# Patient Record
Sex: FEMALE | Race: WHITE | HISPANIC OR LATINO | Employment: OTHER | URBAN - METROPOLITAN AREA
[De-identification: names, ages, dates, MRNs, and addresses within clinical notes are randomized per-mention and may not be internally consistent; named-entity substitution may affect disease eponyms.]

---

## 2017-01-03 ENCOUNTER — HOSPITAL ENCOUNTER (OUTPATIENT)
Dept: LAB | Facility: MEDICAL CENTER | Age: 82
End: 2017-01-03
Attending: FAMILY MEDICINE
Payer: COMMERCIAL

## 2017-01-03 ENCOUNTER — HOSPITAL ENCOUNTER (OUTPATIENT)
Dept: LAB | Facility: MEDICAL CENTER | Age: 82
End: 2017-01-03
Attending: INTERNAL MEDICINE
Payer: COMMERCIAL

## 2017-01-03 LAB
ALBUMIN SERPL BCP-MCNC: 4.1 G/DL (ref 3.2–4.9)
ALBUMIN SERPL BCP-MCNC: 4.2 G/DL (ref 3.2–4.9)
ALBUMIN/GLOB SERPL: 1.8 G/DL
ALP SERPL-CCNC: 114 U/L (ref 30–99)
ALT SERPL-CCNC: 23 U/L (ref 2–50)
ANION GAP SERPL CALC-SCNC: 8 MMOL/L (ref 0–11.9)
AST SERPL-CCNC: 27 U/L (ref 12–45)
BASOPHILS # BLD AUTO: 0.02 K/UL (ref 0–0.12)
BASOPHILS NFR BLD AUTO: 0.4 % (ref 0–1.8)
BILIRUB SERPL-MCNC: 0.5 MG/DL (ref 0.1–1.5)
BUN SERPL-MCNC: 24 MG/DL (ref 8–22)
BUN SERPL-MCNC: 25 MG/DL (ref 8–22)
CALCIUM SERPL-MCNC: 9 MG/DL (ref 8.5–10.5)
CALCIUM SERPL-MCNC: 9.1 MG/DL (ref 8.5–10.5)
CHLORIDE SERPL-SCNC: 108 MMOL/L (ref 96–112)
CHLORIDE SERPL-SCNC: 109 MMOL/L (ref 96–112)
CO2 SERPL-SCNC: 23 MMOL/L (ref 20–33)
CO2 SERPL-SCNC: 24 MMOL/L (ref 20–33)
CREAT SERPL-MCNC: 1.43 MG/DL (ref 0.5–1.4)
CREAT SERPL-MCNC: 1.44 MG/DL (ref 0.5–1.4)
EOSINOPHIL # BLD: 0.08 K/UL (ref 0–0.51)
EOSINOPHIL NFR BLD AUTO: 1.6 % (ref 0–6.9)
ERYTHROCYTE [DISTWIDTH] IN BLOOD BY AUTOMATED COUNT: 47.4 FL (ref 35.9–50)
EST. AVERAGE GLUCOSE BLD GHB EST-MCNC: 140 MG/DL
FERRITIN SERPL-MCNC: 54.5 NG/ML (ref 10–291)
GLOBULIN SER CALC-MCNC: 2.3 G/DL (ref 1.9–3.5)
GLUCOSE SERPL-MCNC: 112 MG/DL (ref 65–99)
GLUCOSE SERPL-MCNC: 113 MG/DL (ref 65–99)
HBA1C MFR BLD: 6.5 % (ref 0–5.6)
HCT VFR BLD AUTO: 33.5 % (ref 37–47)
HGB BLD-MCNC: 10.6 G/DL (ref 12–16)
HGB RETIC QN AUTO: 34.9 PG/CELL (ref 29–35)
IMM GRANULOCYTES # BLD AUTO: 0.02 K/UL (ref 0–0.11)
IMM GRANULOCYTES NFR BLD AUTO: 0.4 % (ref 0–0.9)
IMM RETIC FRACTION L335166: 19.1 % (ref 9.3–17.4)
IRON SERPL-MCNC: 31 UG/DL (ref 40–170)
LYMPHOCYTES # BLD: 0.97 K/UL (ref 1–4.8)
LYMPHOCYTES NFR BLD AUTO: 19.4 % (ref 22–41)
MCH RBC QN AUTO: 29.9 PG (ref 27–33)
MCHC RBC AUTO-ENTMCNC: 31.6 G/DL (ref 33.6–35)
MCV RBC AUTO: 94.4 FL (ref 81.4–97.8)
MONOCYTES # BLD: 0.38 K/UL (ref 0–0.85)
MONOCYTES NFR BLD AUTO: 7.6 % (ref 0–13.4)
NEUTROPHILS # BLD: 3.54 K/UL (ref 2–7.15)
NEUTROPHILS NFR BLD AUTO: 70.6 % (ref 44–72)
NRBC # BLD AUTO: 0 K/UL
NRBC BLD-RTO: 0 /100 WBC
PHOSPHATE SERPL-MCNC: 3 MG/DL (ref 2.5–4.5)
PLATELET # BLD AUTO: 186 K/UL (ref 164–446)
PMV BLD AUTO: 11.3 FL (ref 9–12.9)
POTASSIUM SERPL-SCNC: 3.5 MMOL/L (ref 3.6–5.5)
POTASSIUM SERPL-SCNC: 3.5 MMOL/L (ref 3.6–5.5)
PROT SERPL-MCNC: 6.5 G/DL (ref 6–8.2)
PTH-INTACT SERPL-MCNC: 168.5 PG/ML (ref 14–72)
RBC # BLD AUTO: 3.55 M/UL (ref 4.2–5.4)
RETICS # AUTO: 0.05 M/UL (ref 0.04–0.06)
RETICS/RBC NFR: 1.5 % (ref 0.8–2.1)
SODIUM SERPL-SCNC: 141 MMOL/L (ref 135–145)
SODIUM SERPL-SCNC: 141 MMOL/L (ref 135–145)
WBC # BLD AUTO: 5 K/UL (ref 4.8–10.8)

## 2017-01-03 PROCEDURE — 85025 COMPLETE CBC W/AUTO DIFF WBC: CPT

## 2017-01-03 PROCEDURE — 83970 ASSAY OF PARATHORMONE: CPT

## 2017-01-03 PROCEDURE — 83540 ASSAY OF IRON: CPT

## 2017-01-03 PROCEDURE — 36415 COLL VENOUS BLD VENIPUNCTURE: CPT

## 2017-01-03 PROCEDURE — 80069 RENAL FUNCTION PANEL: CPT

## 2017-01-03 PROCEDURE — 82728 ASSAY OF FERRITIN: CPT

## 2017-01-03 PROCEDURE — 80053 COMPREHEN METABOLIC PANEL: CPT

## 2017-01-03 PROCEDURE — 83036 HEMOGLOBIN GLYCOSYLATED A1C: CPT

## 2017-01-03 PROCEDURE — 85046 RETICYTE/HGB CONCENTRATE: CPT

## 2017-01-10 ENCOUNTER — HOSPITAL ENCOUNTER (OUTPATIENT)
Facility: MEDICAL CENTER | Age: 82
End: 2017-01-10
Attending: FAMILY MEDICINE
Payer: COMMERCIAL

## 2017-01-10 PROCEDURE — 82274 ASSAY TEST FOR BLOOD FECAL: CPT

## 2017-01-14 LAB — HEMOCCULT STL QL IA: NEGATIVE

## 2017-03-15 ENCOUNTER — HOSPITAL ENCOUNTER (OUTPATIENT)
Dept: LAB | Facility: MEDICAL CENTER | Age: 82
End: 2017-03-15
Attending: INTERNAL MEDICINE
Payer: COMMERCIAL

## 2017-03-15 LAB
25(OH)D3 SERPL-MCNC: 28 NG/ML (ref 30–100)
ALBUMIN SERPL BCP-MCNC: 4.1 G/DL (ref 3.2–4.9)
BASOPHILS # BLD AUTO: 0.3 % (ref 0–1.8)
BASOPHILS # BLD: 0.02 K/UL (ref 0–0.12)
BUN SERPL-MCNC: 18 MG/DL (ref 8–22)
CALCIUM SERPL-MCNC: 9.2 MG/DL (ref 8.5–10.5)
CHLORIDE SERPL-SCNC: 100 MMOL/L (ref 96–112)
CO2 SERPL-SCNC: 23 MMOL/L (ref 20–33)
CREAT SERPL-MCNC: 1.38 MG/DL (ref 0.5–1.4)
CREAT UR-MCNC: 53.9 MG/DL
EOSINOPHIL # BLD AUTO: 0.09 K/UL (ref 0–0.51)
EOSINOPHIL NFR BLD: 1.5 % (ref 0–6.9)
ERYTHROCYTE [DISTWIDTH] IN BLOOD BY AUTOMATED COUNT: 46 FL (ref 35.9–50)
GLUCOSE SERPL-MCNC: 188 MG/DL (ref 65–99)
HCT VFR BLD AUTO: 35.4 % (ref 37–47)
HGB BLD-MCNC: 11.9 G/DL (ref 12–16)
IMM GRANULOCYTES # BLD AUTO: 0.01 K/UL (ref 0–0.11)
IMM GRANULOCYTES NFR BLD AUTO: 0.2 % (ref 0–0.9)
LYMPHOCYTES # BLD AUTO: 0.67 K/UL (ref 1–4.8)
LYMPHOCYTES NFR BLD: 11.5 % (ref 22–41)
MAGNESIUM SERPL-MCNC: 1.7 MG/DL (ref 1.5–2.5)
MCH RBC QN AUTO: 29.6 PG (ref 27–33)
MCHC RBC AUTO-ENTMCNC: 33.6 G/DL (ref 33.6–35)
MCV RBC AUTO: 88.1 FL (ref 81.4–97.8)
MONOCYTES # BLD AUTO: 0.48 K/UL (ref 0–0.85)
MONOCYTES NFR BLD AUTO: 8.2 % (ref 0–13.4)
NEUTROPHILS # BLD AUTO: 4.55 K/UL (ref 2–7.15)
NEUTROPHILS NFR BLD: 78.3 % (ref 44–72)
NRBC # BLD AUTO: 0 K/UL
NRBC BLD AUTO-RTO: 0 /100 WBC
PHOSPHATE SERPL-MCNC: 2.5 MG/DL (ref 2.5–4.5)
PLATELET # BLD AUTO: 165 K/UL (ref 164–446)
PMV BLD AUTO: 11.8 FL (ref 9–12.9)
POTASSIUM SERPL-SCNC: 3.9 MMOL/L (ref 3.6–5.5)
PROT 24H UR-MRATE: 9 MG/DL (ref 0–15)
PROT/CREAT UR: 167 MG/G (ref 10–107)
PTH-INTACT SERPL-MCNC: 130.9 PG/ML (ref 14–72)
RBC # BLD AUTO: 4.02 M/UL (ref 4.2–5.4)
SODIUM SERPL-SCNC: 134 MMOL/L (ref 135–145)
URATE SERPL-MCNC: 6.1 MG/DL (ref 1.9–8.2)
WBC # BLD AUTO: 5.8 K/UL (ref 4.8–10.8)

## 2017-03-15 PROCEDURE — 82306 VITAMIN D 25 HYDROXY: CPT

## 2017-03-15 PROCEDURE — 83970 ASSAY OF PARATHORMONE: CPT

## 2017-03-15 PROCEDURE — 85025 COMPLETE CBC W/AUTO DIFF WBC: CPT

## 2017-03-15 PROCEDURE — 36415 COLL VENOUS BLD VENIPUNCTURE: CPT

## 2017-03-15 PROCEDURE — 82570 ASSAY OF URINE CREATININE: CPT

## 2017-03-15 PROCEDURE — 84550 ASSAY OF BLOOD/URIC ACID: CPT

## 2017-03-15 PROCEDURE — 80069 RENAL FUNCTION PANEL: CPT

## 2017-03-15 PROCEDURE — 83735 ASSAY OF MAGNESIUM: CPT

## 2017-03-15 PROCEDURE — 84156 ASSAY OF PROTEIN URINE: CPT

## 2017-06-17 ENCOUNTER — HOSPITAL ENCOUNTER (OUTPATIENT)
Dept: LAB | Facility: MEDICAL CENTER | Age: 82
End: 2017-06-17
Attending: INTERNAL MEDICINE
Payer: COMMERCIAL

## 2017-06-17 ENCOUNTER — HOSPITAL ENCOUNTER (OUTPATIENT)
Dept: LAB | Facility: MEDICAL CENTER | Age: 82
End: 2017-06-17
Attending: FAMILY MEDICINE
Payer: COMMERCIAL

## 2017-06-17 ENCOUNTER — HOSPITAL ENCOUNTER (OUTPATIENT)
Dept: LAB | Facility: MEDICAL CENTER | Age: 82
End: 2017-06-17
Attending: PSYCHIATRY & NEUROLOGY
Payer: COMMERCIAL

## 2017-06-17 LAB
25(OH)D3 SERPL-MCNC: 32 NG/ML (ref 30–100)
ALBUMIN SERPL BCP-MCNC: 4.4 G/DL (ref 3.2–4.9)
BASOPHILS # BLD AUTO: 0.4 % (ref 0–1.8)
BASOPHILS # BLD AUTO: 0.6 % (ref 0–1.8)
BASOPHILS # BLD: 0.02 K/UL (ref 0–0.12)
BASOPHILS # BLD: 0.03 K/UL (ref 0–0.12)
BUN SERPL-MCNC: 29 MG/DL (ref 8–22)
CALCIUM SERPL-MCNC: 9.2 MG/DL (ref 8.5–10.5)
CHLORIDE SERPL-SCNC: 99 MMOL/L (ref 96–112)
CO2 SERPL-SCNC: 26 MMOL/L (ref 20–33)
CREAT SERPL-MCNC: 1.8 MG/DL (ref 0.5–1.4)
EOSINOPHIL # BLD AUTO: 0.06 K/UL (ref 0–0.51)
EOSINOPHIL # BLD AUTO: 0.07 K/UL (ref 0–0.51)
EOSINOPHIL NFR BLD: 1.2 % (ref 0–6.9)
EOSINOPHIL NFR BLD: 1.4 % (ref 0–6.9)
ERYTHROCYTE [DISTWIDTH] IN BLOOD BY AUTOMATED COUNT: 46.7 FL (ref 35.9–50)
ERYTHROCYTE [DISTWIDTH] IN BLOOD BY AUTOMATED COUNT: 47.1 FL (ref 35.9–50)
EST. AVERAGE GLUCOSE BLD GHB EST-MCNC: 157 MG/DL
FOLATE SERPL-MCNC: >23.7 NG/ML
GFR SERPL CREATININE-BSD FRML MDRD: 27 ML/MIN/1.73 M 2
GLUCOSE SERPL-MCNC: 158 MG/DL (ref 65–99)
HBA1C MFR BLD: 7.1 % (ref 0–5.6)
HCT VFR BLD AUTO: 35.2 % (ref 37–47)
HCT VFR BLD AUTO: 35.6 % (ref 37–47)
HGB BLD-MCNC: 11.4 G/DL (ref 12–16)
HGB BLD-MCNC: 11.6 G/DL (ref 12–16)
IMM GRANULOCYTES # BLD AUTO: 0.01 K/UL (ref 0–0.11)
IMM GRANULOCYTES # BLD AUTO: 0.01 K/UL (ref 0–0.11)
IMM GRANULOCYTES NFR BLD AUTO: 0.2 % (ref 0–0.9)
IMM GRANULOCYTES NFR BLD AUTO: 0.2 % (ref 0–0.9)
LYMPHOCYTES # BLD AUTO: 0.81 K/UL (ref 1–4.8)
LYMPHOCYTES # BLD AUTO: 0.82 K/UL (ref 1–4.8)
LYMPHOCYTES NFR BLD: 15.7 % (ref 22–41)
LYMPHOCYTES NFR BLD: 16.1 % (ref 22–41)
MCH RBC QN AUTO: 29.1 PG (ref 27–33)
MCH RBC QN AUTO: 29.1 PG (ref 27–33)
MCHC RBC AUTO-ENTMCNC: 32.4 G/DL (ref 33.6–35)
MCHC RBC AUTO-ENTMCNC: 32.6 G/DL (ref 33.6–35)
MCV RBC AUTO: 89.4 FL (ref 81.4–97.8)
MCV RBC AUTO: 89.8 FL (ref 81.4–97.8)
MONOCYTES # BLD AUTO: 0.27 K/UL (ref 0–0.85)
MONOCYTES # BLD AUTO: 0.28 K/UL (ref 0–0.85)
MONOCYTES NFR BLD AUTO: 5.3 % (ref 0–13.4)
MONOCYTES NFR BLD AUTO: 5.4 % (ref 0–13.4)
NEUTROPHILS # BLD AUTO: 3.9 K/UL (ref 2–7.15)
NEUTROPHILS # BLD AUTO: 3.96 K/UL (ref 2–7.15)
NEUTROPHILS NFR BLD: 76.6 % (ref 44–72)
NEUTROPHILS NFR BLD: 76.9 % (ref 44–72)
NRBC # BLD AUTO: 0 K/UL
NRBC # BLD AUTO: 0 K/UL
NRBC BLD AUTO-RTO: 0 /100 WBC
NRBC BLD AUTO-RTO: 0 /100 WBC
PHOSPHATE SERPL-MCNC: 3.5 MG/DL (ref 2.5–4.5)
PLATELET # BLD AUTO: 185 K/UL (ref 164–446)
PLATELET # BLD AUTO: 185 K/UL (ref 164–446)
PMV BLD AUTO: 10.6 FL (ref 9–12.9)
PMV BLD AUTO: 10.9 FL (ref 9–12.9)
POTASSIUM SERPL-SCNC: 4 MMOL/L (ref 3.6–5.5)
RBC # BLD AUTO: 3.92 M/UL (ref 4.2–5.4)
RBC # BLD AUTO: 3.98 M/UL (ref 4.2–5.4)
SODIUM SERPL-SCNC: 135 MMOL/L (ref 135–145)
T4 FREE SERPL-MCNC: 0.69 NG/DL (ref 0.53–1.43)
T4 FREE SERPL-MCNC: 0.71 NG/DL (ref 0.53–1.43)
TSH SERPL DL<=0.005 MIU/L-ACNC: 21.82 UIU/ML (ref 0.3–3.7)
VIT B12 SERPL-MCNC: 504 PG/ML (ref 211–911)
WBC # BLD AUTO: 5.1 K/UL (ref 4.8–10.8)
WBC # BLD AUTO: 5.2 K/UL (ref 4.8–10.8)

## 2017-06-17 PROCEDURE — 82607 VITAMIN B-12: CPT

## 2017-06-17 PROCEDURE — 82306 VITAMIN D 25 HYDROXY: CPT

## 2017-06-17 PROCEDURE — 85025 COMPLETE CBC W/AUTO DIFF WBC: CPT | Mod: 91

## 2017-06-17 PROCEDURE — 84443 ASSAY THYROID STIM HORMONE: CPT

## 2017-06-17 PROCEDURE — 80069 RENAL FUNCTION PANEL: CPT

## 2017-06-17 PROCEDURE — 85025 COMPLETE CBC W/AUTO DIFF WBC: CPT

## 2017-06-17 PROCEDURE — 36415 COLL VENOUS BLD VENIPUNCTURE: CPT

## 2017-06-17 PROCEDURE — 82746 ASSAY OF FOLIC ACID SERUM: CPT

## 2017-06-17 PROCEDURE — 84439 ASSAY OF FREE THYROXINE: CPT | Mod: 91

## 2017-06-17 PROCEDURE — 84439 ASSAY OF FREE THYROXINE: CPT

## 2017-06-17 PROCEDURE — 83036 HEMOGLOBIN GLYCOSYLATED A1C: CPT

## 2017-08-09 ENCOUNTER — HOSPITAL ENCOUNTER (OUTPATIENT)
Dept: LAB | Facility: MEDICAL CENTER | Age: 82
End: 2017-08-09
Attending: INTERNAL MEDICINE
Payer: COMMERCIAL

## 2017-08-09 LAB
ALBUMIN SERPL BCP-MCNC: 3.9 G/DL (ref 3.2–4.9)
BASOPHILS # BLD AUTO: 0.5 % (ref 0–1.8)
BASOPHILS # BLD: 0.02 K/UL (ref 0–0.12)
BUN SERPL-MCNC: 25 MG/DL (ref 8–22)
CALCIUM SERPL-MCNC: 9.3 MG/DL (ref 8.5–10.5)
CHLORIDE SERPL-SCNC: 105 MMOL/L (ref 96–112)
CO2 SERPL-SCNC: 24 MMOL/L (ref 20–33)
CREAT SERPL-MCNC: 1.35 MG/DL (ref 0.5–1.4)
CREAT UR-MCNC: 120.6 MG/DL
EOSINOPHIL # BLD AUTO: 0.12 K/UL (ref 0–0.51)
EOSINOPHIL NFR BLD: 2.8 % (ref 0–6.9)
ERYTHROCYTE [DISTWIDTH] IN BLOOD BY AUTOMATED COUNT: 45.9 FL (ref 35.9–50)
FERRITIN SERPL-MCNC: 28.6 NG/ML (ref 10–291)
GFR SERPL CREATININE-BSD FRML MDRD: 37 ML/MIN/1.73 M 2
GLUCOSE SERPL-MCNC: 99 MG/DL (ref 65–99)
HCT VFR BLD AUTO: 33.4 % (ref 37–47)
HGB BLD-MCNC: 11.1 G/DL (ref 12–16)
IMM GRANULOCYTES # BLD AUTO: 0.01 K/UL (ref 0–0.11)
IMM GRANULOCYTES NFR BLD AUTO: 0.2 % (ref 0–0.9)
IRON SATN MFR SERPL: 9 % (ref 15–55)
IRON SERPL-MCNC: 40 UG/DL (ref 40–170)
LYMPHOCYTES # BLD AUTO: 1.01 K/UL (ref 1–4.8)
LYMPHOCYTES NFR BLD: 23.3 % (ref 22–41)
MCH RBC QN AUTO: 29.8 PG (ref 27–33)
MCHC RBC AUTO-ENTMCNC: 33.2 G/DL (ref 33.6–35)
MCV RBC AUTO: 89.8 FL (ref 81.4–97.8)
MONOCYTES # BLD AUTO: 0.29 K/UL (ref 0–0.85)
MONOCYTES NFR BLD AUTO: 6.7 % (ref 0–13.4)
NEUTROPHILS # BLD AUTO: 2.88 K/UL (ref 2–7.15)
NEUTROPHILS NFR BLD: 66.5 % (ref 44–72)
NRBC # BLD AUTO: 0 K/UL
NRBC BLD AUTO-RTO: 0 /100 WBC
PHOSPHATE SERPL-MCNC: 3.2 MG/DL (ref 2.5–4.5)
PLATELET # BLD AUTO: 164 K/UL (ref 164–446)
PMV BLD AUTO: 11.4 FL (ref 9–12.9)
POTASSIUM SERPL-SCNC: 4.3 MMOL/L (ref 3.6–5.5)
PROT UR-MCNC: 36.4 MG/DL (ref 0–15)
PROT/CREAT UR: 302 MG/G (ref 10–107)
PTH-INTACT SERPL-MCNC: 121.3 PG/ML (ref 14–72)
RBC # BLD AUTO: 3.72 M/UL (ref 4.2–5.4)
SODIUM SERPL-SCNC: 139 MMOL/L (ref 135–145)
TIBC SERPL-MCNC: 442 UG/DL (ref 250–450)
URATE SERPL-MCNC: 5.8 MG/DL (ref 1.9–8.2)
WBC # BLD AUTO: 4.3 K/UL (ref 4.8–10.8)

## 2017-08-09 PROCEDURE — 82728 ASSAY OF FERRITIN: CPT

## 2017-08-09 PROCEDURE — 83540 ASSAY OF IRON: CPT

## 2017-08-09 PROCEDURE — 84550 ASSAY OF BLOOD/URIC ACID: CPT

## 2017-08-09 PROCEDURE — 80069 RENAL FUNCTION PANEL: CPT

## 2017-08-09 PROCEDURE — 85025 COMPLETE CBC W/AUTO DIFF WBC: CPT

## 2017-08-09 PROCEDURE — 84156 ASSAY OF PROTEIN URINE: CPT

## 2017-08-09 PROCEDURE — 83550 IRON BINDING TEST: CPT

## 2017-08-09 PROCEDURE — 82570 ASSAY OF URINE CREATININE: CPT

## 2017-08-09 PROCEDURE — 36415 COLL VENOUS BLD VENIPUNCTURE: CPT

## 2017-08-09 PROCEDURE — 83970 ASSAY OF PARATHORMONE: CPT

## 2017-10-10 ENCOUNTER — HOSPITAL ENCOUNTER (OUTPATIENT)
Dept: LAB | Facility: MEDICAL CENTER | Age: 82
End: 2017-10-10
Attending: FAMILY MEDICINE
Payer: COMMERCIAL

## 2017-10-10 ENCOUNTER — HOSPITAL ENCOUNTER (OUTPATIENT)
Dept: LAB | Facility: MEDICAL CENTER | Age: 82
End: 2017-10-10
Attending: INTERNAL MEDICINE
Payer: COMMERCIAL

## 2017-10-10 ENCOUNTER — HOSPITAL ENCOUNTER (OUTPATIENT)
Dept: LAB | Facility: MEDICAL CENTER | Age: 82
End: 2017-10-10
Attending: NURSE PRACTITIONER
Payer: COMMERCIAL

## 2017-10-10 LAB
ALBUMIN SERPL BCP-MCNC: 3.9 G/DL (ref 3.2–4.9)
ALBUMIN SERPL BCP-MCNC: 4.2 G/DL (ref 3.2–4.9)
ALBUMIN/GLOB SERPL: 1.3 G/DL
ALP SERPL-CCNC: 100 U/L (ref 30–99)
ALT SERPL-CCNC: 17 U/L (ref 2–50)
ANION GAP SERPL CALC-SCNC: 8 MMOL/L (ref 0–11.9)
AST SERPL-CCNC: 25 U/L (ref 12–45)
BASOPHILS # BLD AUTO: 0.5 % (ref 0–1.8)
BASOPHILS # BLD: 0.03 K/UL (ref 0–0.12)
BILIRUB SERPL-MCNC: 0.5 MG/DL (ref 0.1–1.5)
BUN SERPL-MCNC: 33 MG/DL (ref 8–22)
BUN SERPL-MCNC: 36 MG/DL (ref 8–22)
CALCIUM SERPL-MCNC: 9.7 MG/DL (ref 8.5–10.5)
CALCIUM SERPL-MCNC: 9.8 MG/DL (ref 8.5–10.5)
CHLORIDE SERPL-SCNC: 104 MMOL/L (ref 96–112)
CHLORIDE SERPL-SCNC: 104 MMOL/L (ref 96–112)
CO2 SERPL-SCNC: 25 MMOL/L (ref 20–33)
CO2 SERPL-SCNC: 25 MMOL/L (ref 20–33)
CREAT SERPL-MCNC: 1.61 MG/DL (ref 0.5–1.4)
CREAT SERPL-MCNC: 1.74 MG/DL (ref 0.5–1.4)
EOSINOPHIL # BLD AUTO: 0.18 K/UL (ref 0–0.51)
EOSINOPHIL NFR BLD: 3 % (ref 0–6.9)
ERYTHROCYTE [DISTWIDTH] IN BLOOD BY AUTOMATED COUNT: 48.8 FL (ref 35.9–50)
EST. AVERAGE GLUCOSE BLD GHB EST-MCNC: 140 MG/DL
FERRITIN SERPL-MCNC: 54.8 NG/ML (ref 10–291)
GFR SERPL CREATININE-BSD FRML MDRD: 28 ML/MIN/1.73 M 2
GFR SERPL CREATININE-BSD FRML MDRD: 31 ML/MIN/1.73 M 2
GLOBULIN SER CALC-MCNC: 3 G/DL (ref 1.9–3.5)
GLUCOSE SERPL-MCNC: 116 MG/DL (ref 65–99)
GLUCOSE SERPL-MCNC: 129 MG/DL (ref 65–99)
HBA1C MFR BLD: 6.5 % (ref 0–5.6)
HCT VFR BLD AUTO: 37.3 % (ref 37–47)
HGB BLD-MCNC: 12.3 G/DL (ref 12–16)
IMM GRANULOCYTES # BLD AUTO: 0.02 K/UL (ref 0–0.11)
IMM GRANULOCYTES NFR BLD AUTO: 0.3 % (ref 0–0.9)
IRON SATN MFR SERPL: 17 % (ref 15–55)
IRON SERPL-MCNC: 74 UG/DL (ref 40–170)
LYMPHOCYTES # BLD AUTO: 1.21 K/UL (ref 1–4.8)
LYMPHOCYTES NFR BLD: 20 % (ref 22–41)
MCH RBC QN AUTO: 30.1 PG (ref 27–33)
MCHC RBC AUTO-ENTMCNC: 33 G/DL (ref 33.6–35)
MCV RBC AUTO: 91.2 FL (ref 81.4–97.8)
MONOCYTES # BLD AUTO: 0.37 K/UL (ref 0–0.85)
MONOCYTES NFR BLD AUTO: 6.1 % (ref 0–13.4)
NEUTROPHILS # BLD AUTO: 4.25 K/UL (ref 2–7.15)
NEUTROPHILS NFR BLD: 70.1 % (ref 44–72)
NRBC # BLD AUTO: 0 K/UL
NRBC BLD AUTO-RTO: 0 /100 WBC
PHOSPHATE SERPL-MCNC: 2.7 MG/DL (ref 2.5–4.5)
PLATELET # BLD AUTO: 167 K/UL (ref 164–446)
PMV BLD AUTO: 11.3 FL (ref 9–12.9)
POTASSIUM SERPL-SCNC: 4.5 MMOL/L (ref 3.6–5.5)
POTASSIUM SERPL-SCNC: 4.6 MMOL/L (ref 3.6–5.5)
PROT SERPL-MCNC: 6.9 G/DL (ref 6–8.2)
RBC # BLD AUTO: 4.09 M/UL (ref 4.2–5.4)
SODIUM SERPL-SCNC: 137 MMOL/L (ref 135–145)
SODIUM SERPL-SCNC: 138 MMOL/L (ref 135–145)
TIBC SERPL-MCNC: 430 UG/DL (ref 250–450)
TSH SERPL DL<=0.005 MIU/L-ACNC: 2.56 UIU/ML (ref 0.3–3.7)
WBC # BLD AUTO: 6.1 K/UL (ref 4.8–10.8)

## 2017-10-10 PROCEDURE — 83036 HEMOGLOBIN GLYCOSYLATED A1C: CPT

## 2017-10-10 PROCEDURE — 80069 RENAL FUNCTION PANEL: CPT

## 2017-10-10 PROCEDURE — 83550 IRON BINDING TEST: CPT

## 2017-10-10 PROCEDURE — 36415 COLL VENOUS BLD VENIPUNCTURE: CPT

## 2017-10-10 PROCEDURE — 85025 COMPLETE CBC W/AUTO DIFF WBC: CPT

## 2017-10-10 PROCEDURE — 82728 ASSAY OF FERRITIN: CPT

## 2017-10-10 PROCEDURE — 84443 ASSAY THYROID STIM HORMONE: CPT

## 2017-10-10 PROCEDURE — 80053 COMPREHEN METABOLIC PANEL: CPT

## 2017-10-10 PROCEDURE — 83540 ASSAY OF IRON: CPT

## 2017-12-15 ENCOUNTER — HOSPITAL ENCOUNTER (OUTPATIENT)
Dept: LAB | Facility: MEDICAL CENTER | Age: 82
End: 2017-12-15
Attending: INTERNAL MEDICINE
Payer: COMMERCIAL

## 2017-12-15 LAB
ALBUMIN SERPL BCP-MCNC: 4.1 G/DL (ref 3.2–4.9)
BASOPHILS # BLD AUTO: 0.8 % (ref 0–1.8)
BASOPHILS # BLD: 0.05 K/UL (ref 0–0.12)
BUN SERPL-MCNC: 35 MG/DL (ref 8–22)
CALCIUM SERPL-MCNC: 9.3 MG/DL (ref 8.5–10.5)
CHLORIDE SERPL-SCNC: 102 MMOL/L (ref 96–112)
CO2 SERPL-SCNC: 25 MMOL/L (ref 20–33)
CREAT SERPL-MCNC: 1.81 MG/DL (ref 0.5–1.4)
CREAT UR-MCNC: 44.7 MG/DL
EOSINOPHIL # BLD AUTO: 0.05 K/UL (ref 0–0.51)
EOSINOPHIL NFR BLD: 0.8 % (ref 0–6.9)
ERYTHROCYTE [DISTWIDTH] IN BLOOD BY AUTOMATED COUNT: 46.5 FL (ref 35.9–50)
GFR SERPL CREATININE-BSD FRML MDRD: 27 ML/MIN/1.73 M 2
GLUCOSE SERPL-MCNC: 125 MG/DL (ref 65–99)
HCT VFR BLD AUTO: 34 % (ref 37–47)
HGB BLD-MCNC: 11.5 G/DL (ref 12–16)
IMM GRANULOCYTES # BLD AUTO: 0.01 K/UL (ref 0–0.11)
IMM GRANULOCYTES NFR BLD AUTO: 0.2 % (ref 0–0.9)
LYMPHOCYTES # BLD AUTO: 1.32 K/UL (ref 1–4.8)
LYMPHOCYTES NFR BLD: 20.5 % (ref 22–41)
MCH RBC QN AUTO: 30.8 PG (ref 27–33)
MCHC RBC AUTO-ENTMCNC: 33.8 G/DL (ref 33.6–35)
MCV RBC AUTO: 91.2 FL (ref 81.4–97.8)
MONOCYTES # BLD AUTO: 0.43 K/UL (ref 0–0.85)
MONOCYTES NFR BLD AUTO: 6.7 % (ref 0–13.4)
NEUTROPHILS # BLD AUTO: 4.59 K/UL (ref 2–7.15)
NEUTROPHILS NFR BLD: 71 % (ref 44–72)
NRBC # BLD AUTO: 0 K/UL
NRBC BLD AUTO-RTO: 0 /100 WBC
PHOSPHATE SERPL-MCNC: 3.4 MG/DL (ref 2.5–4.5)
PLATELET # BLD AUTO: 176 K/UL (ref 164–446)
PMV BLD AUTO: 10.9 FL (ref 9–12.9)
POTASSIUM SERPL-SCNC: 4.5 MMOL/L (ref 3.6–5.5)
PROT UR-MCNC: 4.5 MG/DL (ref 0–15)
PROT/CREAT UR: 101 MG/G (ref 10–107)
RBC # BLD AUTO: 3.73 M/UL (ref 4.2–5.4)
SODIUM SERPL-SCNC: 136 MMOL/L (ref 135–145)
WBC # BLD AUTO: 6.5 K/UL (ref 4.8–10.8)

## 2017-12-15 PROCEDURE — 82570 ASSAY OF URINE CREATININE: CPT

## 2017-12-15 PROCEDURE — 84156 ASSAY OF PROTEIN URINE: CPT

## 2017-12-15 PROCEDURE — 85025 COMPLETE CBC W/AUTO DIFF WBC: CPT

## 2017-12-15 PROCEDURE — 36415 COLL VENOUS BLD VENIPUNCTURE: CPT

## 2017-12-15 PROCEDURE — 80069 RENAL FUNCTION PANEL: CPT

## 2018-01-01 ENCOUNTER — HOME CARE VISIT (OUTPATIENT)
Dept: HOME HEALTH SERVICES | Facility: HOME HEALTHCARE | Age: 83
End: 2018-01-01
Payer: COMMERCIAL

## 2018-01-01 ENCOUNTER — HOSPITAL ENCOUNTER (OUTPATIENT)
Dept: LAB | Facility: MEDICAL CENTER | Age: 83
End: 2018-04-23
Attending: INTERNAL MEDICINE
Payer: COMMERCIAL

## 2018-01-01 ENCOUNTER — APPOINTMENT (OUTPATIENT)
Dept: RADIOLOGY | Facility: MEDICAL CENTER | Age: 83
End: 2018-01-01
Attending: EMERGENCY MEDICINE
Payer: COMMERCIAL

## 2018-01-01 ENCOUNTER — APPOINTMENT (OUTPATIENT)
Dept: RADIOLOGY | Facility: MEDICAL CENTER | Age: 83
DRG: 208 | End: 2018-01-01
Attending: INTERNAL MEDICINE
Payer: COMMERCIAL

## 2018-01-01 ENCOUNTER — HOSPITAL ENCOUNTER (OUTPATIENT)
Dept: LAB | Facility: MEDICAL CENTER | Age: 83
End: 2018-07-26
Attending: FAMILY MEDICINE
Payer: COMMERCIAL

## 2018-01-01 ENCOUNTER — APPOINTMENT (OUTPATIENT)
Dept: CARDIOLOGY | Facility: MEDICAL CENTER | Age: 83
DRG: 208 | End: 2018-01-01
Attending: HOSPITALIST
Payer: COMMERCIAL

## 2018-01-01 ENCOUNTER — HOME HEALTH ADMISSION (OUTPATIENT)
Dept: HOME HEALTH SERVICES | Facility: HOME HEALTHCARE | Age: 83
End: 2018-01-01
Payer: COMMERCIAL

## 2018-01-01 ENCOUNTER — HOSPITAL ENCOUNTER (OUTPATIENT)
Dept: LAB | Facility: MEDICAL CENTER | Age: 83
End: 2018-11-21
Attending: INTERNAL MEDICINE
Payer: COMMERCIAL

## 2018-01-01 ENCOUNTER — PATIENT OUTREACH (OUTPATIENT)
Dept: HEALTH INFORMATION MANAGEMENT | Facility: OTHER | Age: 83
End: 2018-01-01

## 2018-01-01 ENCOUNTER — HOSPITAL ENCOUNTER (OUTPATIENT)
Dept: LAB | Facility: MEDICAL CENTER | Age: 83
End: 2018-07-18
Attending: INTERNAL MEDICINE
Payer: COMMERCIAL

## 2018-01-01 ENCOUNTER — HOSPITAL ENCOUNTER (INPATIENT)
Facility: MEDICAL CENTER | Age: 83
LOS: 4 days | DRG: 683 | End: 2018-12-03
Attending: EMERGENCY MEDICINE | Admitting: HOSPITALIST
Payer: COMMERCIAL

## 2018-01-01 ENCOUNTER — HOSPITAL ENCOUNTER (EMERGENCY)
Facility: MEDICAL CENTER | Age: 83
End: 2018-07-29
Attending: EMERGENCY MEDICINE
Payer: COMMERCIAL

## 2018-01-01 ENCOUNTER — HOSPITAL ENCOUNTER (OUTPATIENT)
Dept: LAB | Facility: MEDICAL CENTER | Age: 83
End: 2018-02-26
Attending: INTERNAL MEDICINE
Payer: COMMERCIAL

## 2018-01-01 ENCOUNTER — APPOINTMENT (OUTPATIENT)
Dept: RADIOLOGY | Facility: MEDICAL CENTER | Age: 83
DRG: 208 | End: 2018-01-01
Attending: HOSPITALIST
Payer: COMMERCIAL

## 2018-01-01 ENCOUNTER — HOSPITAL ENCOUNTER (OUTPATIENT)
Dept: LAB | Facility: MEDICAL CENTER | Age: 83
End: 2018-05-17
Attending: INTERNAL MEDICINE
Payer: COMMERCIAL

## 2018-01-01 ENCOUNTER — APPOINTMENT (OUTPATIENT)
Dept: RADIOLOGY | Facility: MEDICAL CENTER | Age: 83
DRG: 683 | End: 2018-01-01
Attending: INTERNAL MEDICINE
Payer: COMMERCIAL

## 2018-01-01 ENCOUNTER — HOSPITAL ENCOUNTER (OUTPATIENT)
Dept: LAB | Facility: MEDICAL CENTER | Age: 83
End: 2018-11-05
Attending: FAMILY MEDICINE
Payer: COMMERCIAL

## 2018-01-01 ENCOUNTER — APPOINTMENT (OUTPATIENT)
Dept: RADIOLOGY | Facility: MEDICAL CENTER | Age: 83
DRG: 208 | End: 2018-01-01
Attending: EMERGENCY MEDICINE
Payer: COMMERCIAL

## 2018-01-01 ENCOUNTER — HOSPITAL ENCOUNTER (OUTPATIENT)
Dept: LAB | Facility: MEDICAL CENTER | Age: 83
End: 2018-11-05
Attending: INTERNAL MEDICINE
Payer: COMMERCIAL

## 2018-01-01 ENCOUNTER — HOSPITAL ENCOUNTER (EMERGENCY)
Facility: MEDICAL CENTER | Age: 83
End: 2018-03-18
Attending: EMERGENCY MEDICINE
Payer: COMMERCIAL

## 2018-01-01 ENCOUNTER — APPOINTMENT (OUTPATIENT)
Dept: RADIOLOGY | Facility: MEDICAL CENTER | Age: 83
DRG: 683 | End: 2018-01-01
Attending: EMERGENCY MEDICINE
Payer: COMMERCIAL

## 2018-01-01 ENCOUNTER — HOSPITAL ENCOUNTER (OUTPATIENT)
Dept: LAB | Facility: MEDICAL CENTER | Age: 83
End: 2018-12-06
Attending: INTERNAL MEDICINE
Payer: COMMERCIAL

## 2018-01-01 ENCOUNTER — HOSPITAL ENCOUNTER (OUTPATIENT)
Dept: RADIOLOGY | Facility: MEDICAL CENTER | Age: 83
End: 2018-12-26
Attending: FAMILY MEDICINE
Payer: COMMERCIAL

## 2018-01-01 ENCOUNTER — HOSPITAL ENCOUNTER (OUTPATIENT)
Dept: LAB | Facility: MEDICAL CENTER | Age: 83
End: 2018-08-20
Attending: INTERNAL MEDICINE
Payer: COMMERCIAL

## 2018-01-01 ENCOUNTER — HOSPITAL ENCOUNTER (INPATIENT)
Facility: MEDICAL CENTER | Age: 83
LOS: 3 days | DRG: 208 | End: 2018-12-31
Attending: EMERGENCY MEDICINE | Admitting: INTERNAL MEDICINE
Payer: COMMERCIAL

## 2018-01-01 ENCOUNTER — HOSPITAL ENCOUNTER (EMERGENCY)
Facility: MEDICAL CENTER | Age: 83
End: 2018-07-26
Attending: EMERGENCY MEDICINE
Payer: COMMERCIAL

## 2018-01-01 ENCOUNTER — HOSPITAL ENCOUNTER (OUTPATIENT)
Dept: LAB | Facility: MEDICAL CENTER | Age: 83
End: 2018-02-26
Attending: FAMILY MEDICINE
Payer: COMMERCIAL

## 2018-01-01 VITALS
HEART RATE: 78 BPM | SYSTOLIC BLOOD PRESSURE: 120 MMHG | DIASTOLIC BLOOD PRESSURE: 60 MMHG | OXYGEN SATURATION: 94 % | DIASTOLIC BLOOD PRESSURE: 60 MMHG | TEMPERATURE: 98.2 F | SYSTOLIC BLOOD PRESSURE: 140 MMHG | RESPIRATION RATE: 16 BRPM | TEMPERATURE: 99.8 F | OXYGEN SATURATION: 96 % | RESPIRATION RATE: 16 BRPM | HEART RATE: 86 BPM

## 2018-01-01 VITALS
TEMPERATURE: 98.2 F | BODY MASS INDEX: 33.38 KG/M2 | HEIGHT: 60 IN | HEART RATE: 78 BPM | SYSTOLIC BLOOD PRESSURE: 147 MMHG | WEIGHT: 170 LBS | OXYGEN SATURATION: 98 % | RESPIRATION RATE: 18 BRPM | DIASTOLIC BLOOD PRESSURE: 52 MMHG

## 2018-01-01 VITALS
BODY MASS INDEX: 32.27 KG/M2 | DIASTOLIC BLOOD PRESSURE: 50 MMHG | SYSTOLIC BLOOD PRESSURE: 124 MMHG | OXYGEN SATURATION: 93 % | HEART RATE: 81 BPM | RESPIRATION RATE: 16 BRPM | TEMPERATURE: 98.8 F | WEIGHT: 165.25 LBS

## 2018-01-01 VITALS
BODY MASS INDEX: 33.37 KG/M2 | SYSTOLIC BLOOD PRESSURE: 131 MMHG | DIASTOLIC BLOOD PRESSURE: 55 MMHG | HEIGHT: 60 IN | WEIGHT: 169.97 LBS | TEMPERATURE: 101.5 F

## 2018-01-01 VITALS
DIASTOLIC BLOOD PRESSURE: 61 MMHG | RESPIRATION RATE: 16 BRPM | SYSTOLIC BLOOD PRESSURE: 124 MMHG | HEART RATE: 82 BPM | TEMPERATURE: 98.3 F | WEIGHT: 154.32 LBS | OXYGEN SATURATION: 91 % | BODY MASS INDEX: 30.3 KG/M2 | HEIGHT: 60 IN

## 2018-01-01 VITALS
RESPIRATION RATE: 18 BRPM | TEMPERATURE: 97.6 F | WEIGHT: 161.4 LBS | SYSTOLIC BLOOD PRESSURE: 162 MMHG | DIASTOLIC BLOOD PRESSURE: 84 MMHG | OXYGEN SATURATION: 98 % | HEART RATE: 81 BPM | BODY MASS INDEX: 31.52 KG/M2

## 2018-01-01 VITALS
HEART RATE: 81 BPM | TEMPERATURE: 98.8 F | RESPIRATION RATE: 16 BRPM | OXYGEN SATURATION: 96 % | DIASTOLIC BLOOD PRESSURE: 64 MMHG | SYSTOLIC BLOOD PRESSURE: 152 MMHG

## 2018-01-01 VITALS
HEART RATE: 75 BPM | OXYGEN SATURATION: 95 % | RESPIRATION RATE: 18 BRPM | HEIGHT: 60 IN | TEMPERATURE: 98.6 F | BODY MASS INDEX: 32.34 KG/M2 | DIASTOLIC BLOOD PRESSURE: 72 MMHG | WEIGHT: 164.7 LBS | SYSTOLIC BLOOD PRESSURE: 138 MMHG

## 2018-01-01 VITALS
DIASTOLIC BLOOD PRESSURE: 60 MMHG | TEMPERATURE: 98 F | OXYGEN SATURATION: 93 % | SYSTOLIC BLOOD PRESSURE: 130 MMHG | RESPIRATION RATE: 16 BRPM | HEART RATE: 82 BPM

## 2018-01-01 VITALS
TEMPERATURE: 100.7 F | HEART RATE: 82 BPM | RESPIRATION RATE: 16 BRPM | DIASTOLIC BLOOD PRESSURE: 62 MMHG | SYSTOLIC BLOOD PRESSURE: 138 MMHG | OXYGEN SATURATION: 92 %

## 2018-01-01 VITALS
SYSTOLIC BLOOD PRESSURE: 130 MMHG | HEART RATE: 85 BPM | OXYGEN SATURATION: 95 % | TEMPERATURE: 99.3 F | RESPIRATION RATE: 18 BRPM | DIASTOLIC BLOOD PRESSURE: 52 MMHG

## 2018-01-01 VITALS
TEMPERATURE: 98.8 F | SYSTOLIC BLOOD PRESSURE: 124 MMHG | DIASTOLIC BLOOD PRESSURE: 50 MMHG | HEART RATE: 81 BPM | OXYGEN SATURATION: 93 % | RESPIRATION RATE: 16 BRPM

## 2018-01-01 VITALS
DIASTOLIC BLOOD PRESSURE: 60 MMHG | HEART RATE: 78 BPM | OXYGEN SATURATION: 96 % | RESPIRATION RATE: 16 BRPM | SYSTOLIC BLOOD PRESSURE: 140 MMHG

## 2018-01-01 VITALS
SYSTOLIC BLOOD PRESSURE: 152 MMHG | TEMPERATURE: 98.8 F | WEIGHT: 162 LBS | BODY MASS INDEX: 31.64 KG/M2 | DIASTOLIC BLOOD PRESSURE: 64 MMHG | OXYGEN SATURATION: 96 % | RESPIRATION RATE: 16 BRPM | HEART RATE: 81 BPM

## 2018-01-01 VITALS
OXYGEN SATURATION: 85 % | HEART RATE: 92 BPM | RESPIRATION RATE: 36 BRPM | TEMPERATURE: 104.8 F | DIASTOLIC BLOOD PRESSURE: 60 MMHG | SYSTOLIC BLOOD PRESSURE: 156 MMHG

## 2018-01-01 VITALS
HEIGHT: 60 IN | OXYGEN SATURATION: 96 % | WEIGHT: 185.19 LBS | BODY MASS INDEX: 36.36 KG/M2 | TEMPERATURE: 98.4 F | RESPIRATION RATE: 16 BRPM | HEART RATE: 58 BPM

## 2018-01-01 VITALS
TEMPERATURE: 98 F | SYSTOLIC BLOOD PRESSURE: 134 MMHG | BODY MASS INDEX: 33.38 KG/M2 | RESPIRATION RATE: 20 BRPM | HEIGHT: 60 IN | WEIGHT: 170 LBS | HEART RATE: 54 BPM | DIASTOLIC BLOOD PRESSURE: 54 MMHG | OXYGEN SATURATION: 94 %

## 2018-01-01 DIAGNOSIS — N17.9 AKI (ACUTE KIDNEY INJURY) (HCC): ICD-10-CM

## 2018-01-01 DIAGNOSIS — S09.90XA CLOSED HEAD INJURY, INITIAL ENCOUNTER: ICD-10-CM

## 2018-01-01 DIAGNOSIS — R05.9 COUGH: ICD-10-CM

## 2018-01-01 DIAGNOSIS — E08.00 DIABETES MELLITUS DUE TO UNDERLYING CONDITION WITH HYPEROSMOLARITY WITHOUT COMA, WITH LONG-TERM CURRENT USE OF INSULIN (HCC): ICD-10-CM

## 2018-01-01 DIAGNOSIS — R42 DIZZINESS: ICD-10-CM

## 2018-01-01 DIAGNOSIS — Z79.4 DIABETES MELLITUS DUE TO UNDERLYING CONDITION WITH HYPEROSMOLARITY WITHOUT COMA, WITH LONG-TERM CURRENT USE OF INSULIN (HCC): ICD-10-CM

## 2018-01-01 DIAGNOSIS — S00.03XA CONTUSION OF SCALP, INITIAL ENCOUNTER: ICD-10-CM

## 2018-01-01 DIAGNOSIS — E87.6 HYPOKALEMIA: ICD-10-CM

## 2018-01-01 DIAGNOSIS — G20.A1 PARKINSON DISEASE: ICD-10-CM

## 2018-01-01 DIAGNOSIS — I10 ESSENTIAL HYPERTENSION: ICD-10-CM
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
## 2018-01-01 DIAGNOSIS — R53.1 WEAKNESS: ICD-10-CM

## 2018-01-01 DIAGNOSIS — Z79.01 ANTICOAGULATED BY ANTICOAGULATION TREATMENT: ICD-10-CM

## 2018-01-01 DIAGNOSIS — R55 COLLAPSE: ICD-10-CM

## 2018-01-01 LAB
25(OH)D3 SERPL-MCNC: 17 NG/ML (ref 30–100)
25(OH)D3 SERPL-MCNC: 21 NG/ML (ref 30–100)
25(OH)D3 SERPL-MCNC: 31 NG/ML (ref 30–100)
ACTION RANGE TRIGGERED IACRT: YES
ALBUMIN SERPL BCP-MCNC: 2.9 G/DL (ref 3.2–4.9)
ALBUMIN SERPL BCP-MCNC: 3.1 G/DL (ref 3.2–4.9)
ALBUMIN SERPL BCP-MCNC: 3.8 G/DL (ref 3.2–4.9)
ALBUMIN SERPL BCP-MCNC: 3.9 G/DL (ref 3.2–4.9)
ALBUMIN SERPL BCP-MCNC: 3.9 G/DL (ref 3.2–4.9)
ALBUMIN SERPL BCP-MCNC: 4 G/DL (ref 3.2–4.9)
ALBUMIN SERPL BCP-MCNC: 4.1 G/DL (ref 3.2–4.9)
ALBUMIN SERPL BCP-MCNC: 4.2 G/DL (ref 3.2–4.9)
ALBUMIN SERPL BCP-MCNC: 4.4 G/DL (ref 3.2–4.9)
ALBUMIN SERPL BCP-MCNC: 4.4 G/DL (ref 3.2–4.9)
ALBUMIN/GLOB SERPL: 1 G/DL
ALBUMIN/GLOB SERPL: 1.4 G/DL
ALBUMIN/GLOB SERPL: 1.5 G/DL
ALBUMIN/GLOB SERPL: 1.5 G/DL
ALBUMIN/GLOB SERPL: 1.6 G/DL
ALBUMIN/GLOB SERPL: 1.6 G/DL
ALBUMIN/GLOB SERPL: 1.8 G/DL
ALBUMIN/GLOB SERPL: 1.8 G/DL
ALP SERPL-CCNC: 105 U/L (ref 30–99)
ALP SERPL-CCNC: 73 U/L (ref 30–99)
ALP SERPL-CCNC: 85 U/L (ref 30–99)
ALP SERPL-CCNC: 88 U/L (ref 30–99)
ALP SERPL-CCNC: 89 U/L (ref 30–99)
ALP SERPL-CCNC: 92 U/L (ref 30–99)
ALP SERPL-CCNC: 95 U/L (ref 30–99)
ALP SERPL-CCNC: 98 U/L (ref 30–99)
ALT SERPL-CCNC: 11 U/L (ref 2–50)
ALT SERPL-CCNC: 15 U/L (ref 2–50)
ALT SERPL-CCNC: 15 U/L (ref 2–50)
ALT SERPL-CCNC: 16 U/L (ref 2–50)
ALT SERPL-CCNC: 17 U/L (ref 2–50)
ALT SERPL-CCNC: 17 U/L (ref 2–50)
ALT SERPL-CCNC: 18 U/L (ref 2–50)
ALT SERPL-CCNC: 94 U/L (ref 2–50)
ANION GAP SERPL CALC-SCNC: 10 MMOL/L (ref 0–11.9)
ANION GAP SERPL CALC-SCNC: 10 MMOL/L (ref 0–11.9)
ANION GAP SERPL CALC-SCNC: 11 MMOL/L (ref 0–11.9)
ANION GAP SERPL CALC-SCNC: 12 MMOL/L (ref 0–11.9)
ANION GAP SERPL CALC-SCNC: 13 MMOL/L (ref 0–11.9)
ANION GAP SERPL CALC-SCNC: 7 MMOL/L (ref 0–11.9)
ANION GAP SERPL CALC-SCNC: 8 MMOL/L (ref 0–11.9)
ANION GAP SERPL CALC-SCNC: 9 MMOL/L (ref 0–11.9)
ANION GAP SERPL CALC-SCNC: 9 MMOL/L (ref 0–11.9)
APPEARANCE UR: ABNORMAL
APPEARANCE UR: CLEAR
APTT PPP: 101.2 SEC (ref 24.7–36)
APTT PPP: 106.5 SEC (ref 24.7–36)
APTT PPP: 155.5 SEC (ref 24.7–36)
APTT PPP: 202.7 SEC (ref 24.7–36)
APTT PPP: 36.6 SEC (ref 24.7–36)
APTT PPP: 45.9 SEC (ref 24.7–36)
APTT PPP: 55.1 SEC (ref 24.7–36)
APTT PPP: 75.5 SEC (ref 24.7–36)
APTT PPP: 79.5 SEC (ref 24.7–36)
APTT PPP: 83 SEC (ref 24.7–36)
APTT PPP: 84.1 SEC (ref 24.7–36)
APTT PPP: 85.2 SEC (ref 24.7–36)
APTT PPP: 89.4 SEC (ref 24.7–36)
AST SERPL-CCNC: 18 U/L (ref 12–45)
AST SERPL-CCNC: 19 U/L (ref 12–45)
AST SERPL-CCNC: 21 U/L (ref 12–45)
AST SERPL-CCNC: 21 U/L (ref 12–45)
AST SERPL-CCNC: 22 U/L (ref 12–45)
AST SERPL-CCNC: 22 U/L (ref 12–45)
AST SERPL-CCNC: 275 U/L (ref 12–45)
AST SERPL-CCNC: 28 U/L (ref 12–45)
BACTERIA #/AREA URNS HPF: ABNORMAL /HPF
BACTERIA #/AREA URNS HPF: NEGATIVE /HPF
BACTERIA BRONCH AEROBE CULT: ABNORMAL
BACTERIA BRONCH AEROBE CULT: ABNORMAL
BACTERIA UR CULT: ABNORMAL
BACTERIA UR CULT: ABNORMAL
BASE EXCESS BLDA CALC-SCNC: -11 MMOL/L (ref -4–3)
BASE EXCESS BLDA CALC-SCNC: -13 MMOL/L (ref -4–3)
BASE EXCESS BLDA CALC-SCNC: -9 MMOL/L (ref -4–3)
BASOPHILS # BLD AUTO: 0.1 % (ref 0–1.8)
BASOPHILS # BLD AUTO: 0.1 % (ref 0–1.8)
BASOPHILS # BLD AUTO: 0.2 % (ref 0–1.8)
BASOPHILS # BLD AUTO: 0.3 % (ref 0–1.8)
BASOPHILS # BLD AUTO: 0.4 % (ref 0–1.8)
BASOPHILS # BLD AUTO: 0.5 % (ref 0–1.8)
BASOPHILS # BLD AUTO: 0.7 % (ref 0–1.8)
BASOPHILS # BLD: 0.01 K/UL (ref 0–0.12)
BASOPHILS # BLD: 0.02 K/UL (ref 0–0.12)
BASOPHILS # BLD: 0.03 K/UL (ref 0–0.12)
BASOPHILS # BLD: 0.04 K/UL (ref 0–0.12)
BASOPHILS # BLD: 0.04 K/UL (ref 0–0.12)
BILIRUB SERPL-MCNC: 0.4 MG/DL (ref 0.1–1.5)
BILIRUB SERPL-MCNC: 0.5 MG/DL (ref 0.1–1.5)
BILIRUB SERPL-MCNC: 0.6 MG/DL (ref 0.1–1.5)
BILIRUB SERPL-MCNC: 0.7 MG/DL (ref 0.1–1.5)
BILIRUB UR QL STRIP.AUTO: NEGATIVE
BILIRUB UR QL STRIP.AUTO: NEGATIVE
BNP SERPL-MCNC: 170 PG/ML (ref 0–100)
BNP SERPL-MCNC: 705 PG/ML (ref 0–100)
BODY TEMPERATURE: ABNORMAL DEGREES
BUN SERPL-MCNC: 113 MG/DL (ref 8–22)
BUN SERPL-MCNC: 21 MG/DL (ref 8–22)
BUN SERPL-MCNC: 22 MG/DL (ref 8–22)
BUN SERPL-MCNC: 30 MG/DL (ref 8–22)
BUN SERPL-MCNC: 33 MG/DL (ref 8–22)
BUN SERPL-MCNC: 34 MG/DL (ref 8–22)
BUN SERPL-MCNC: 34 MG/DL (ref 8–22)
BUN SERPL-MCNC: 36 MG/DL (ref 8–22)
BUN SERPL-MCNC: 39 MG/DL (ref 8–22)
BUN SERPL-MCNC: 40 MG/DL (ref 8–22)
BUN SERPL-MCNC: 41 MG/DL (ref 8–22)
BUN SERPL-MCNC: 41 MG/DL (ref 8–22)
BUN SERPL-MCNC: 42 MG/DL (ref 8–22)
BUN SERPL-MCNC: 43 MG/DL (ref 8–22)
BUN SERPL-MCNC: 48 MG/DL (ref 8–22)
BUN SERPL-MCNC: 52 MG/DL (ref 8–22)
BUN SERPL-MCNC: 65 MG/DL (ref 8–22)
BUN SERPL-MCNC: 76 MG/DL (ref 8–22)
BUN SERPL-MCNC: 85 MG/DL (ref 8–22)
BUN SERPL-MCNC: 99 MG/DL (ref 8–22)
CALCIUM SERPL-MCNC: 7.2 MG/DL (ref 8.5–10.5)
CALCIUM SERPL-MCNC: 7.7 MG/DL (ref 8.5–10.5)
CALCIUM SERPL-MCNC: 8.2 MG/DL (ref 8.5–10.5)
CALCIUM SERPL-MCNC: 8.4 MG/DL (ref 8.5–10.5)
CALCIUM SERPL-MCNC: 8.9 MG/DL (ref 8.5–10.5)
CALCIUM SERPL-MCNC: 9 MG/DL (ref 8.5–10.5)
CALCIUM SERPL-MCNC: 9 MG/DL (ref 8.5–10.5)
CALCIUM SERPL-MCNC: 9.1 MG/DL (ref 8.5–10.5)
CALCIUM SERPL-MCNC: 9.2 MG/DL (ref 8.5–10.5)
CALCIUM SERPL-MCNC: 9.3 MG/DL (ref 8.5–10.5)
CALCIUM SERPL-MCNC: 9.4 MG/DL (ref 8.5–10.5)
CALCIUM SERPL-MCNC: 9.4 MG/DL (ref 8.5–10.5)
CALCIUM SERPL-MCNC: 9.6 MG/DL (ref 8.5–10.5)
CHLORIDE SERPL-SCNC: 101 MMOL/L (ref 96–112)
CHLORIDE SERPL-SCNC: 102 MMOL/L (ref 96–112)
CHLORIDE SERPL-SCNC: 103 MMOL/L (ref 96–112)
CHLORIDE SERPL-SCNC: 104 MMOL/L (ref 96–112)
CHLORIDE SERPL-SCNC: 104 MMOL/L (ref 96–112)
CHLORIDE SERPL-SCNC: 107 MMOL/L (ref 96–112)
CHLORIDE SERPL-SCNC: 109 MMOL/L (ref 96–112)
CHLORIDE SERPL-SCNC: 93 MMOL/L (ref 96–112)
CHLORIDE SERPL-SCNC: 96 MMOL/L (ref 96–112)
CHOLEST SERPL-MCNC: 99 MG/DL (ref 100–199)
CO2 BLDA-SCNC: 15 MMOL/L (ref 20–33)
CO2 BLDA-SCNC: 18 MMOL/L (ref 20–33)
CO2 BLDA-SCNC: 19 MMOL/L (ref 20–33)
CO2 SERPL-SCNC: 16 MMOL/L (ref 20–33)
CO2 SERPL-SCNC: 18 MMOL/L (ref 20–33)
CO2 SERPL-SCNC: 19 MMOL/L (ref 20–33)
CO2 SERPL-SCNC: 20 MMOL/L (ref 20–33)
CO2 SERPL-SCNC: 21 MMOL/L (ref 20–33)
CO2 SERPL-SCNC: 24 MMOL/L (ref 20–33)
CO2 SERPL-SCNC: 25 MMOL/L (ref 20–33)
CO2 SERPL-SCNC: 26 MMOL/L (ref 20–33)
CO2 SERPL-SCNC: 27 MMOL/L (ref 20–33)
CO2 SERPL-SCNC: 27 MMOL/L (ref 20–33)
CO2 SERPL-SCNC: 28 MMOL/L (ref 20–33)
COLOR UR: YELLOW
COLOR UR: YELLOW
CREAT SERPL-MCNC: 1.15 MG/DL (ref 0.5–1.4)
CREAT SERPL-MCNC: 1.3 MG/DL (ref 0.5–1.4)
CREAT SERPL-MCNC: 1.7 MG/DL (ref 0.5–1.4)
CREAT SERPL-MCNC: 1.73 MG/DL (ref 0.5–1.4)
CREAT SERPL-MCNC: 1.77 MG/DL (ref 0.5–1.4)
CREAT SERPL-MCNC: 1.78 MG/DL (ref 0.5–1.4)
CREAT SERPL-MCNC: 1.79 MG/DL (ref 0.5–1.4)
CREAT SERPL-MCNC: 1.93 MG/DL (ref 0.5–1.4)
CREAT SERPL-MCNC: 1.95 MG/DL (ref 0.5–1.4)
CREAT SERPL-MCNC: 2.01 MG/DL (ref 0.5–1.4)
CREAT SERPL-MCNC: 2.02 MG/DL (ref 0.5–1.4)
CREAT SERPL-MCNC: 2.04 MG/DL (ref 0.5–1.4)
CREAT SERPL-MCNC: 2.08 MG/DL (ref 0.5–1.4)
CREAT SERPL-MCNC: 2.13 MG/DL (ref 0.5–1.4)
CREAT SERPL-MCNC: 2.31 MG/DL (ref 0.5–1.4)
CREAT SERPL-MCNC: 2.79 MG/DL (ref 0.5–1.4)
CREAT SERPL-MCNC: 3.15 MG/DL (ref 0.5–1.4)
CREAT SERPL-MCNC: 3.22 MG/DL (ref 0.5–1.4)
CREAT SERPL-MCNC: 3.54 MG/DL (ref 0.5–1.4)
CREAT SERPL-MCNC: 3.97 MG/DL (ref 0.5–1.4)
CREAT UR-MCNC: 108.2 MG/DL
CREAT UR-MCNC: 113.4 MG/DL
CREAT UR-MCNC: 144.8 MG/DL
CREAT UR-MCNC: 71.8 MG/DL
CREAT UR-MCNC: 78.5 MG/DL
CRP SERPL HS-MCNC: 25.6 MG/DL (ref 0–0.75)
CYTOLOGY REG CYTOL: NORMAL
EKG IMPRESSION: NORMAL
EKG IMPRESSION: NORMAL
EOSINOPHIL # BLD AUTO: 0 K/UL (ref 0–0.51)
EOSINOPHIL # BLD AUTO: 0.01 K/UL (ref 0–0.51)
EOSINOPHIL # BLD AUTO: 0.03 K/UL (ref 0–0.51)
EOSINOPHIL # BLD AUTO: 0.04 K/UL (ref 0–0.51)
EOSINOPHIL # BLD AUTO: 0.05 K/UL (ref 0–0.51)
EOSINOPHIL # BLD AUTO: 0.06 K/UL (ref 0–0.51)
EOSINOPHIL # BLD AUTO: 0.06 K/UL (ref 0–0.51)
EOSINOPHIL # BLD AUTO: 0.08 K/UL (ref 0–0.51)
EOSINOPHIL # BLD AUTO: 0.08 K/UL (ref 0–0.51)
EOSINOPHIL # BLD AUTO: 0.12 K/UL (ref 0–0.51)
EOSINOPHIL # BLD AUTO: 0.14 K/UL (ref 0–0.51)
EOSINOPHIL # BLD AUTO: 0.15 K/UL (ref 0–0.51)
EOSINOPHIL # BLD AUTO: 0.17 K/UL (ref 0–0.51)
EOSINOPHIL NFR BLD: 0 % (ref 0–6.9)
EOSINOPHIL NFR BLD: 0 % (ref 0–6.9)
EOSINOPHIL NFR BLD: 0.2 % (ref 0–6.9)
EOSINOPHIL NFR BLD: 0.6 % (ref 0–6.9)
EOSINOPHIL NFR BLD: 0.8 % (ref 0–6.9)
EOSINOPHIL NFR BLD: 0.8 % (ref 0–6.9)
EOSINOPHIL NFR BLD: 0.9 % (ref 0–6.9)
EOSINOPHIL NFR BLD: 0.9 % (ref 0–6.9)
EOSINOPHIL NFR BLD: 1 % (ref 0–6.9)
EOSINOPHIL NFR BLD: 1 % (ref 0–6.9)
EOSINOPHIL NFR BLD: 1.2 % (ref 0–6.9)
EOSINOPHIL NFR BLD: 1.2 % (ref 0–6.9)
EOSINOPHIL NFR BLD: 1.7 % (ref 0–6.9)
EOSINOPHIL NFR BLD: 1.9 % (ref 0–6.9)
EOSINOPHIL NFR BLD: 2.1 % (ref 0–6.9)
EPI CELLS #/AREA URNS HPF: ABNORMAL /HPF
EPI CELLS #/AREA URNS HPF: ABNORMAL /HPF
ERYTHROCYTE [DISTWIDTH] IN BLOOD BY AUTOMATED COUNT: 42.3 FL (ref 35.9–50)
ERYTHROCYTE [DISTWIDTH] IN BLOOD BY AUTOMATED COUNT: 42.8 FL (ref 35.9–50)
ERYTHROCYTE [DISTWIDTH] IN BLOOD BY AUTOMATED COUNT: 44.6 FL (ref 35.9–50)
ERYTHROCYTE [DISTWIDTH] IN BLOOD BY AUTOMATED COUNT: 45.1 FL (ref 35.9–50)
ERYTHROCYTE [DISTWIDTH] IN BLOOD BY AUTOMATED COUNT: 45.2 FL (ref 35.9–50)
ERYTHROCYTE [DISTWIDTH] IN BLOOD BY AUTOMATED COUNT: 46.3 FL (ref 35.9–50)
ERYTHROCYTE [DISTWIDTH] IN BLOOD BY AUTOMATED COUNT: 46.7 FL (ref 35.9–50)
ERYTHROCYTE [DISTWIDTH] IN BLOOD BY AUTOMATED COUNT: 46.7 FL (ref 35.9–50)
ERYTHROCYTE [DISTWIDTH] IN BLOOD BY AUTOMATED COUNT: 47.4 FL (ref 35.9–50)
ERYTHROCYTE [DISTWIDTH] IN BLOOD BY AUTOMATED COUNT: 47.7 FL (ref 35.9–50)
ERYTHROCYTE [DISTWIDTH] IN BLOOD BY AUTOMATED COUNT: 47.8 FL (ref 35.9–50)
ERYTHROCYTE [DISTWIDTH] IN BLOOD BY AUTOMATED COUNT: 48.4 FL (ref 35.9–50)
ERYTHROCYTE [DISTWIDTH] IN BLOOD BY AUTOMATED COUNT: 49 FL (ref 35.9–50)
ERYTHROCYTE [DISTWIDTH] IN BLOOD BY AUTOMATED COUNT: 49.5 FL (ref 35.9–50)
ERYTHROCYTE [DISTWIDTH] IN BLOOD BY AUTOMATED COUNT: 52.6 FL (ref 35.9–50)
EST. AVERAGE GLUCOSE BLD GHB EST-MCNC: 157 MG/DL
EST. AVERAGE GLUCOSE BLD GHB EST-MCNC: 169 MG/DL
EST. AVERAGE GLUCOSE BLD GHB EST-MCNC: 180 MG/DL
EST. AVERAGE GLUCOSE BLD GHB EST-MCNC: 183 MG/DL
EST. AVERAGE GLUCOSE BLD GHB EST-MCNC: 197 MG/DL
FASTING STATUS PATIENT QL REPORTED: NORMAL
FERRITIN SERPL-MCNC: 107.1 NG/ML (ref 10–291)
FLUAV RNA SPEC QL NAA+PROBE: NEGATIVE
FLUBV RNA SPEC QL NAA+PROBE: NEGATIVE
GLOBULIN SER CALC-MCNC: 2 G/DL (ref 1.9–3.5)
GLOBULIN SER CALC-MCNC: 2.4 G/DL (ref 1.9–3.5)
GLOBULIN SER CALC-MCNC: 2.4 G/DL (ref 1.9–3.5)
GLOBULIN SER CALC-MCNC: 2.5 G/DL (ref 1.9–3.5)
GLOBULIN SER CALC-MCNC: 2.7 G/DL (ref 1.9–3.5)
GLOBULIN SER CALC-MCNC: 2.8 G/DL (ref 1.9–3.5)
GLUCOSE BLD-MCNC: 111 MG/DL (ref 65–99)
GLUCOSE BLD-MCNC: 119 MG/DL (ref 65–99)
GLUCOSE BLD-MCNC: 119 MG/DL (ref 65–99)
GLUCOSE BLD-MCNC: 121 MG/DL (ref 65–99)
GLUCOSE BLD-MCNC: 128 MG/DL (ref 65–99)
GLUCOSE BLD-MCNC: 132 MG/DL (ref 65–99)
GLUCOSE BLD-MCNC: 133 MG/DL (ref 65–99)
GLUCOSE BLD-MCNC: 139 MG/DL (ref 65–99)
GLUCOSE BLD-MCNC: 140 MG/DL (ref 65–99)
GLUCOSE BLD-MCNC: 141 MG/DL (ref 65–99)
GLUCOSE BLD-MCNC: 159 MG/DL (ref 65–99)
GLUCOSE BLD-MCNC: 165 MG/DL (ref 65–99)
GLUCOSE BLD-MCNC: 166 MG/DL (ref 65–99)
GLUCOSE BLD-MCNC: 166 MG/DL (ref 65–99)
GLUCOSE BLD-MCNC: 171 MG/DL (ref 65–99)
GLUCOSE BLD-MCNC: 176 MG/DL (ref 65–99)
GLUCOSE BLD-MCNC: 183 MG/DL (ref 65–99)
GLUCOSE BLD-MCNC: 216 MG/DL (ref 65–99)
GLUCOSE BLD-MCNC: 221 MG/DL (ref 65–99)
GLUCOSE BLD-MCNC: 228 MG/DL (ref 65–99)
GLUCOSE BLD-MCNC: 229 MG/DL (ref 65–99)
GLUCOSE BLD-MCNC: 239 MG/DL (ref 65–99)
GLUCOSE BLD-MCNC: 270 MG/DL (ref 65–99)
GLUCOSE BLD-MCNC: 271 MG/DL (ref 65–99)
GLUCOSE BLD-MCNC: 287 MG/DL (ref 65–99)
GLUCOSE BLD-MCNC: 288 MG/DL (ref 65–99)
GLUCOSE BLD-MCNC: 303 MG/DL (ref 65–99)
GLUCOSE BLD-MCNC: 327 MG/DL (ref 65–99)
GLUCOSE BLD-MCNC: 328 MG/DL (ref 65–99)
GLUCOSE BLD-MCNC: 99 MG/DL (ref 65–99)
GLUCOSE SERPL-MCNC: 102 MG/DL (ref 65–99)
GLUCOSE SERPL-MCNC: 112 MG/DL (ref 65–99)
GLUCOSE SERPL-MCNC: 117 MG/DL (ref 65–99)
GLUCOSE SERPL-MCNC: 121 MG/DL (ref 65–99)
GLUCOSE SERPL-MCNC: 125 MG/DL (ref 65–99)
GLUCOSE SERPL-MCNC: 127 MG/DL (ref 65–99)
GLUCOSE SERPL-MCNC: 128 MG/DL (ref 65–99)
GLUCOSE SERPL-MCNC: 132 MG/DL (ref 65–99)
GLUCOSE SERPL-MCNC: 133 MG/DL (ref 65–99)
GLUCOSE SERPL-MCNC: 140 MG/DL (ref 65–99)
GLUCOSE SERPL-MCNC: 165 MG/DL (ref 65–99)
GLUCOSE SERPL-MCNC: 174 MG/DL (ref 65–99)
GLUCOSE SERPL-MCNC: 175 MG/DL (ref 65–99)
GLUCOSE SERPL-MCNC: 186 MG/DL (ref 65–99)
GLUCOSE SERPL-MCNC: 192 MG/DL (ref 65–99)
GLUCOSE SERPL-MCNC: 205 MG/DL (ref 65–99)
GLUCOSE SERPL-MCNC: 226 MG/DL (ref 65–99)
GLUCOSE SERPL-MCNC: 278 MG/DL (ref 65–99)
GLUCOSE SERPL-MCNC: 359 MG/DL (ref 65–99)
GLUCOSE SERPL-MCNC: 79 MG/DL (ref 65–99)
GLUCOSE UR STRIP.AUTO-MCNC: NEGATIVE MG/DL
GLUCOSE UR STRIP.AUTO-MCNC: NEGATIVE MG/DL
GRAM STN SPEC: ABNORMAL
GRAM STN SPEC: NORMAL
HBA1C MFR BLD: 7.1 % (ref 0–5.6)
HBA1C MFR BLD: 7.5 % (ref 0–5.6)
HBA1C MFR BLD: 7.9 % (ref 0–5.6)
HBA1C MFR BLD: 8 % (ref 0–5.6)
HBA1C MFR BLD: 8.5 % (ref 0–5.6)
HCO3 BLDA-SCNC: 13.7 MMOL/L (ref 17–25)
HCO3 BLDA-SCNC: 17 MMOL/L (ref 17–25)
HCO3 BLDA-SCNC: 17.1 MMOL/L (ref 17–25)
HCT VFR BLD AUTO: 26.9 % (ref 37–47)
HCT VFR BLD AUTO: 29.1 % (ref 37–47)
HCT VFR BLD AUTO: 29.8 % (ref 37–47)
HCT VFR BLD AUTO: 30.3 % (ref 37–47)
HCT VFR BLD AUTO: 33.4 % (ref 37–47)
HCT VFR BLD AUTO: 33.6 % (ref 37–47)
HCT VFR BLD AUTO: 35.2 % (ref 37–47)
HCT VFR BLD AUTO: 35.6 % (ref 37–47)
HCT VFR BLD AUTO: 35.7 % (ref 37–47)
HCT VFR BLD AUTO: 36.8 % (ref 37–47)
HCT VFR BLD AUTO: 36.9 % (ref 37–47)
HCT VFR BLD AUTO: 36.9 % (ref 37–47)
HCT VFR BLD AUTO: 38 % (ref 37–47)
HCT VFR BLD AUTO: 38.7 % (ref 37–47)
HCT VFR BLD AUTO: 39.3 % (ref 37–47)
HDLC SERPL-MCNC: 18 MG/DL
HGB BLD-MCNC: 10.1 G/DL (ref 12–16)
HGB BLD-MCNC: 11.2 G/DL (ref 12–16)
HGB BLD-MCNC: 11.5 G/DL (ref 12–16)
HGB BLD-MCNC: 11.7 G/DL (ref 12–16)
HGB BLD-MCNC: 12 G/DL (ref 12–16)
HGB BLD-MCNC: 12.1 G/DL (ref 12–16)
HGB BLD-MCNC: 12.1 G/DL (ref 12–16)
HGB BLD-MCNC: 12.3 G/DL (ref 12–16)
HGB BLD-MCNC: 12.4 G/DL (ref 12–16)
HGB BLD-MCNC: 12.6 G/DL (ref 12–16)
HGB BLD-MCNC: 12.7 G/DL (ref 12–16)
HGB BLD-MCNC: 12.9 G/DL (ref 12–16)
HGB BLD-MCNC: 8.3 G/DL (ref 12–16)
HGB BLD-MCNC: 9.5 G/DL (ref 12–16)
HGB BLD-MCNC: 9.7 G/DL (ref 12–16)
HOROWITZ INDEX BLDA+IHG-RTO: 113 MM[HG]
HOROWITZ INDEX BLDA+IHG-RTO: 150 MM[HG]
HOROWITZ INDEX BLDA+IHG-RTO: 86 MM[HG]
HYALINE CASTS #/AREA URNS LPF: ABNORMAL /LPF
HYALINE CASTS #/AREA URNS LPF: ABNORMAL /LPF
IMM GRANULOCYTES # BLD AUTO: 0.01 K/UL (ref 0–0.11)
IMM GRANULOCYTES # BLD AUTO: 0.02 K/UL (ref 0–0.11)
IMM GRANULOCYTES # BLD AUTO: 0.03 K/UL (ref 0–0.11)
IMM GRANULOCYTES # BLD AUTO: 0.06 K/UL (ref 0–0.11)
IMM GRANULOCYTES # BLD AUTO: 0.08 K/UL (ref 0–0.11)
IMM GRANULOCYTES # BLD AUTO: 0.15 K/UL (ref 0–0.11)
IMM GRANULOCYTES # BLD AUTO: 0.17 K/UL (ref 0–0.11)
IMM GRANULOCYTES NFR BLD AUTO: 0.2 % (ref 0–0.9)
IMM GRANULOCYTES NFR BLD AUTO: 0.3 % (ref 0–0.9)
IMM GRANULOCYTES NFR BLD AUTO: 0.4 % (ref 0–0.9)
IMM GRANULOCYTES NFR BLD AUTO: 0.5 % (ref 0–0.9)
IMM GRANULOCYTES NFR BLD AUTO: 0.5 % (ref 0–0.9)
IMM GRANULOCYTES NFR BLD AUTO: 0.7 % (ref 0–0.9)
IMM GRANULOCYTES NFR BLD AUTO: 0.7 % (ref 0–0.9)
INR PPP: 1.33 (ref 0.87–1.13)
INR PPP: 1.66 (ref 0.87–1.13)
INR PPP: 1.73 (ref 0.87–1.13)
INR PPP: 1.76 (ref 0.87–1.13)
INST. QUALIFIED PATIENT IIQPT: YES
IRON SATN MFR SERPL: 15 % (ref 15–55)
IRON SERPL-MCNC: 58 UG/DL (ref 40–170)
KETONES UR STRIP.AUTO-MCNC: NEGATIVE MG/DL
KETONES UR STRIP.AUTO-MCNC: NEGATIVE MG/DL
LACTATE BLD-SCNC: 0.9 MMOL/L (ref 0.5–2)
LACTATE BLD-SCNC: 1.1 MMOL/L (ref 0.5–2)
LACTATE BLD-SCNC: 1.3 MMOL/L (ref 0.5–2)
LACTATE BLD-SCNC: 1.7 MMOL/L (ref 0.5–2)
LDLC SERPL CALC-MCNC: 67 MG/DL
LEUKOCYTE ESTERASE UR QL STRIP.AUTO: ABNORMAL
LEUKOCYTE ESTERASE UR QL STRIP.AUTO: ABNORMAL
LV EJECT FRACT  99904: 60
LV EJECT FRACT MOD 2C 99903: 35.74
LV EJECT FRACT MOD 4C 99902: 60.84
LV EJECT FRACT MOD BP 99901: 51
LYMPHOCYTES # BLD AUTO: 0.3 K/UL (ref 1–4.8)
LYMPHOCYTES # BLD AUTO: 0.32 K/UL (ref 1–4.8)
LYMPHOCYTES # BLD AUTO: 0.62 K/UL (ref 1–4.8)
LYMPHOCYTES # BLD AUTO: 0.63 K/UL (ref 1–4.8)
LYMPHOCYTES # BLD AUTO: 0.76 K/UL (ref 1–4.8)
LYMPHOCYTES # BLD AUTO: 0.78 K/UL (ref 1–4.8)
LYMPHOCYTES # BLD AUTO: 0.79 K/UL (ref 1–4.8)
LYMPHOCYTES # BLD AUTO: 0.84 K/UL (ref 1–4.8)
LYMPHOCYTES # BLD AUTO: 0.87 K/UL (ref 1–4.8)
LYMPHOCYTES # BLD AUTO: 0.94 K/UL (ref 1–4.8)
LYMPHOCYTES # BLD AUTO: 0.96 K/UL (ref 1–4.8)
LYMPHOCYTES # BLD AUTO: 1.01 K/UL (ref 1–4.8)
LYMPHOCYTES # BLD AUTO: 1.03 K/UL (ref 1–4.8)
LYMPHOCYTES # BLD AUTO: 1.05 K/UL (ref 1–4.8)
LYMPHOCYTES # BLD AUTO: 1.21 K/UL (ref 1–4.8)
LYMPHOCYTES NFR BLD: 1.4 % (ref 22–41)
LYMPHOCYTES NFR BLD: 1.9 % (ref 22–41)
LYMPHOCYTES NFR BLD: 11.2 % (ref 22–41)
LYMPHOCYTES NFR BLD: 12 % (ref 22–41)
LYMPHOCYTES NFR BLD: 12.1 % (ref 22–41)
LYMPHOCYTES NFR BLD: 12.7 % (ref 22–41)
LYMPHOCYTES NFR BLD: 14 % (ref 22–41)
LYMPHOCYTES NFR BLD: 14.1 % (ref 22–41)
LYMPHOCYTES NFR BLD: 14.6 % (ref 22–41)
LYMPHOCYTES NFR BLD: 15.6 % (ref 22–41)
LYMPHOCYTES NFR BLD: 16 % (ref 22–41)
LYMPHOCYTES NFR BLD: 17.4 % (ref 22–41)
LYMPHOCYTES NFR BLD: 18.3 % (ref 22–41)
LYMPHOCYTES NFR BLD: 2.7 % (ref 22–41)
LYMPHOCYTES NFR BLD: 4.7 % (ref 22–41)
MAGNESIUM SERPL-MCNC: 1.4 MG/DL (ref 1.5–2.5)
MAGNESIUM SERPL-MCNC: 2.1 MG/DL (ref 1.5–2.5)
MAGNESIUM SERPL-MCNC: 2.3 MG/DL (ref 1.5–2.5)
MAGNESIUM SERPL-MCNC: 2.4 MG/DL (ref 1.5–2.5)
MAGNESIUM SERPL-MCNC: 2.6 MG/DL (ref 1.5–2.5)
MAGNESIUM SERPL-MCNC: 2.9 MG/DL (ref 1.5–2.5)
MCH RBC QN AUTO: 30.2 PG (ref 27–33)
MCH RBC QN AUTO: 30.3 PG (ref 27–33)
MCH RBC QN AUTO: 31 PG (ref 27–33)
MCH RBC QN AUTO: 31.1 PG (ref 27–33)
MCH RBC QN AUTO: 31.2 PG (ref 27–33)
MCH RBC QN AUTO: 31.3 PG (ref 27–33)
MCH RBC QN AUTO: 31.5 PG (ref 27–33)
MCH RBC QN AUTO: 31.8 PG (ref 27–33)
MCH RBC QN AUTO: 31.9 PG (ref 27–33)
MCH RBC QN AUTO: 32 PG (ref 27–33)
MCH RBC QN AUTO: 32 PG (ref 27–33)
MCHC RBC AUTO-ENTMCNC: 30.9 G/DL (ref 33.6–35)
MCHC RBC AUTO-ENTMCNC: 31.4 G/DL (ref 33.6–35)
MCHC RBC AUTO-ENTMCNC: 32.3 G/DL (ref 33.6–35)
MCHC RBC AUTO-ENTMCNC: 32.3 G/DL (ref 33.6–35)
MCHC RBC AUTO-ENTMCNC: 32.6 G/DL (ref 33.6–35)
MCHC RBC AUTO-ENTMCNC: 32.8 G/DL (ref 33.6–35)
MCHC RBC AUTO-ENTMCNC: 33.2 G/DL (ref 33.6–35)
MCHC RBC AUTO-ENTMCNC: 33.3 G/DL (ref 33.6–35)
MCHC RBC AUTO-ENTMCNC: 33.6 G/DL (ref 33.6–35)
MCHC RBC AUTO-ENTMCNC: 33.9 G/DL (ref 33.6–35)
MCHC RBC AUTO-ENTMCNC: 33.9 G/DL (ref 33.6–35)
MCHC RBC AUTO-ENTMCNC: 34.9 G/DL (ref 33.6–35)
MCHC RBC AUTO-ENTMCNC: 35 G/DL (ref 33.6–35)
MCV RBC AUTO: 90 FL (ref 81.4–97.8)
MCV RBC AUTO: 91.2 FL (ref 81.4–97.8)
MCV RBC AUTO: 91.7 FL (ref 81.4–97.8)
MCV RBC AUTO: 93.3 FL (ref 81.4–97.8)
MCV RBC AUTO: 94.4 FL (ref 81.4–97.8)
MCV RBC AUTO: 94.6 FL (ref 81.4–97.8)
MCV RBC AUTO: 95.1 FL (ref 81.4–97.8)
MCV RBC AUTO: 95.3 FL (ref 81.4–97.8)
MCV RBC AUTO: 95.5 FL (ref 81.4–97.8)
MCV RBC AUTO: 96 FL (ref 81.4–97.8)
MCV RBC AUTO: 96.1 FL (ref 81.4–97.8)
MCV RBC AUTO: 96.1 FL (ref 81.4–97.8)
MCV RBC AUTO: 96.2 FL (ref 81.4–97.8)
MCV RBC AUTO: 96.5 FL (ref 81.4–97.8)
MCV RBC AUTO: 98.2 FL (ref 81.4–97.8)
MICRO URNS: ABNORMAL
MICRO URNS: ABNORMAL
MICROALBUMIN UR-MCNC: 0.9 MG/DL
MICROALBUMIN UR-MCNC: 5.2 MG/DL
MICROALBUMIN/CREAT UR: 13 MG/G (ref 0–30)
MICROALBUMIN/CREAT UR: 36 MG/G (ref 0–30)
MONOCYTES # BLD AUTO: 0.31 K/UL (ref 0–0.85)
MONOCYTES # BLD AUTO: 0.32 K/UL (ref 0–0.85)
MONOCYTES # BLD AUTO: 0.39 K/UL (ref 0–0.85)
MONOCYTES # BLD AUTO: 0.4 K/UL (ref 0–0.85)
MONOCYTES # BLD AUTO: 0.42 K/UL (ref 0–0.85)
MONOCYTES # BLD AUTO: 0.46 K/UL (ref 0–0.85)
MONOCYTES # BLD AUTO: 0.46 K/UL (ref 0–0.85)
MONOCYTES # BLD AUTO: 0.47 K/UL (ref 0–0.85)
MONOCYTES # BLD AUTO: 0.49 K/UL (ref 0–0.85)
MONOCYTES # BLD AUTO: 0.53 K/UL (ref 0–0.85)
MONOCYTES # BLD AUTO: 0.56 K/UL (ref 0–0.85)
MONOCYTES # BLD AUTO: 0.57 K/UL (ref 0–0.85)
MONOCYTES # BLD AUTO: 0.62 K/UL (ref 0–0.85)
MONOCYTES # BLD AUTO: 0.65 K/UL (ref 0–0.85)
MONOCYTES # BLD AUTO: 0.93 K/UL (ref 0–0.85)
MONOCYTES NFR BLD AUTO: 2.5 % (ref 0–13.4)
MONOCYTES NFR BLD AUTO: 3.9 % (ref 0–13.4)
MONOCYTES NFR BLD AUTO: 4.1 % (ref 0–13.4)
MONOCYTES NFR BLD AUTO: 4.6 % (ref 0–13.4)
MONOCYTES NFR BLD AUTO: 5.6 % (ref 0–13.4)
MONOCYTES NFR BLD AUTO: 5.7 % (ref 0–13.4)
MONOCYTES NFR BLD AUTO: 5.9 % (ref 0–13.4)
MONOCYTES NFR BLD AUTO: 6.1 % (ref 0–13.4)
MONOCYTES NFR BLD AUTO: 6.7 % (ref 0–13.4)
MONOCYTES NFR BLD AUTO: 6.8 % (ref 0–13.4)
MONOCYTES NFR BLD AUTO: 6.8 % (ref 0–13.4)
MONOCYTES NFR BLD AUTO: 6.9 % (ref 0–13.4)
MONOCYTES NFR BLD AUTO: 6.9 % (ref 0–13.4)
MONOCYTES NFR BLD AUTO: 7.6 % (ref 0–13.4)
MONOCYTES NFR BLD AUTO: 8.4 % (ref 0–13.4)
MRSA DNA SPEC QL NAA+PROBE: NORMAL
NEUTROPHILS # BLD AUTO: 11.88 K/UL (ref 2–7.15)
NEUTROPHILS # BLD AUTO: 15.41 K/UL (ref 2–7.15)
NEUTROPHILS # BLD AUTO: 20.95 K/UL (ref 2–7.15)
NEUTROPHILS # BLD AUTO: 21.1 K/UL (ref 2–7.15)
NEUTROPHILS # BLD AUTO: 4.25 K/UL (ref 2–7.15)
NEUTROPHILS # BLD AUTO: 4.25 K/UL (ref 2–7.15)
NEUTROPHILS # BLD AUTO: 4.39 K/UL (ref 2–7.15)
NEUTROPHILS # BLD AUTO: 4.68 K/UL (ref 2–7.15)
NEUTROPHILS # BLD AUTO: 4.69 K/UL (ref 2–7.15)
NEUTROPHILS # BLD AUTO: 4.84 K/UL (ref 2–7.15)
NEUTROPHILS # BLD AUTO: 5.05 K/UL (ref 2–7.15)
NEUTROPHILS # BLD AUTO: 5.2 K/UL (ref 2–7.15)
NEUTROPHILS # BLD AUTO: 5.61 K/UL (ref 2–7.15)
NEUTROPHILS # BLD AUTO: 6.21 K/UL (ref 2–7.15)
NEUTROPHILS # BLD AUTO: 6.43 K/UL (ref 2–7.15)
NEUTROPHILS NFR BLD: 73.1 % (ref 44–72)
NEUTROPHILS NFR BLD: 74 % (ref 44–72)
NEUTROPHILS NFR BLD: 74.1 % (ref 44–72)
NEUTROPHILS NFR BLD: 76 % (ref 44–72)
NEUTROPHILS NFR BLD: 77.3 % (ref 44–72)
NEUTROPHILS NFR BLD: 77.9 % (ref 44–72)
NEUTROPHILS NFR BLD: 78.3 % (ref 44–72)
NEUTROPHILS NFR BLD: 78.7 % (ref 44–72)
NEUTROPHILS NFR BLD: 78.7 % (ref 44–72)
NEUTROPHILS NFR BLD: 80.1 % (ref 44–72)
NEUTROPHILS NFR BLD: 80.1 % (ref 44–72)
NEUTROPHILS NFR BLD: 89.2 % (ref 44–72)
NEUTROPHILS NFR BLD: 92.3 % (ref 44–72)
NEUTROPHILS NFR BLD: 93.3 % (ref 44–72)
NEUTROPHILS NFR BLD: 95.3 % (ref 44–72)
NITRITE UR QL STRIP.AUTO: NEGATIVE
NITRITE UR QL STRIP.AUTO: NEGATIVE
NRBC # BLD AUTO: 0 K/UL
NRBC BLD-RTO: 0 /100 WBC
O2/TOTAL GAS SETTING VFR VENT: 100 %
O2/TOTAL GAS SETTING VFR VENT: 60 %
O2/TOTAL GAS SETTING VFR VENT: 70 %
PCO2 BLDA: 35.3 MMHG (ref 26–37)
PCO2 BLDA: 37.2 MMHG (ref 26–37)
PCO2 BLDA: 47.8 MMHG (ref 26–37)
PCO2 TEMP ADJ BLDA: 37.4 MMHG (ref 26–37)
PCO2 TEMP ADJ BLDA: 38.4 MMHG (ref 26–37)
PCO2 TEMP ADJ BLDA: 50 MMHG (ref 26–37)
PH BLDA: 7.16 [PH] (ref 7.4–7.5)
PH BLDA: 7.2 [PH] (ref 7.4–7.5)
PH BLDA: 7.27 [PH] (ref 7.4–7.5)
PH TEMP ADJ BLDA: 7.15 [PH] (ref 7.4–7.5)
PH TEMP ADJ BLDA: 7.17 [PH] (ref 7.4–7.5)
PH TEMP ADJ BLDA: 7.27 [PH] (ref 7.4–7.5)
PH UR STRIP.AUTO: 5 [PH]
PH UR STRIP.AUTO: 5.5 [PH]
PHOSPHATE SERPL-MCNC: 2.3 MG/DL (ref 2.5–4.5)
PHOSPHATE SERPL-MCNC: 2.7 MG/DL (ref 2.5–4.5)
PHOSPHATE SERPL-MCNC: 2.9 MG/DL (ref 2.5–4.5)
PHOSPHATE SERPL-MCNC: 2.9 MG/DL (ref 2.5–4.5)
PHOSPHATE SERPL-MCNC: 3.4 MG/DL (ref 2.5–4.5)
PHOSPHATE SERPL-MCNC: 3.6 MG/DL (ref 2.5–4.5)
PHOSPHATE SERPL-MCNC: 3.6 MG/DL (ref 2.5–4.5)
PHOSPHATE SERPL-MCNC: 3.8 MG/DL (ref 2.5–4.5)
PHOSPHATE SERPL-MCNC: 4.4 MG/DL (ref 2.5–4.5)
PHOSPHATE SERPL-MCNC: 4.5 MG/DL (ref 2.5–4.5)
PHOSPHATE SERPL-MCNC: 4.9 MG/DL (ref 2.5–4.5)
PLATELET # BLD AUTO: 155 K/UL (ref 164–446)
PLATELET # BLD AUTO: 159 K/UL (ref 164–446)
PLATELET # BLD AUTO: 161 K/UL (ref 164–446)
PLATELET # BLD AUTO: 167 K/UL (ref 164–446)
PLATELET # BLD AUTO: 170 K/UL (ref 164–446)
PLATELET # BLD AUTO: 171 K/UL (ref 164–446)
PLATELET # BLD AUTO: 175 K/UL (ref 164–446)
PLATELET # BLD AUTO: 182 K/UL (ref 164–446)
PLATELET # BLD AUTO: 188 K/UL (ref 164–446)
PLATELET # BLD AUTO: 190 K/UL (ref 164–446)
PLATELET # BLD AUTO: 198 K/UL (ref 164–446)
PLATELET # BLD AUTO: 222 K/UL (ref 164–446)
PLATELET # BLD AUTO: 223 K/UL (ref 164–446)
PMV BLD AUTO: 10.1 FL (ref 9–12.9)
PMV BLD AUTO: 10.4 FL (ref 9–12.9)
PMV BLD AUTO: 10.4 FL (ref 9–12.9)
PMV BLD AUTO: 10.5 FL (ref 9–12.9)
PMV BLD AUTO: 10.5 FL (ref 9–12.9)
PMV BLD AUTO: 10.6 FL (ref 9–12.9)
PMV BLD AUTO: 10.6 FL (ref 9–12.9)
PMV BLD AUTO: 10.8 FL (ref 9–12.9)
PMV BLD AUTO: 10.9 FL (ref 9–12.9)
PMV BLD AUTO: 11.1 FL (ref 9–12.9)
PMV BLD AUTO: 11.2 FL (ref 9–12.9)
PMV BLD AUTO: 11.3 FL (ref 9–12.9)
PMV BLD AUTO: 11.4 FL (ref 9–12.9)
PMV BLD AUTO: 11.5 FL (ref 9–12.9)
PMV BLD AUTO: 9.9 FL (ref 9–12.9)
PO2 BLDA: 105 MMHG (ref 64–87)
PO2 BLDA: 68 MMHG (ref 64–87)
PO2 BLDA: 86 MMHG (ref 64–87)
PO2 TEMP ADJ BLDA: 106 MMHG (ref 64–87)
PO2 TEMP ADJ BLDA: 78 MMHG (ref 64–87)
PO2 TEMP ADJ BLDA: 92 MMHG (ref 64–87)
POTASSIUM SERPL-SCNC: 3.1 MMOL/L (ref 3.6–5.5)
POTASSIUM SERPL-SCNC: 3.6 MMOL/L (ref 3.6–5.5)
POTASSIUM SERPL-SCNC: 3.8 MMOL/L (ref 3.6–5.5)
POTASSIUM SERPL-SCNC: 3.8 MMOL/L (ref 3.6–5.5)
POTASSIUM SERPL-SCNC: 3.9 MMOL/L (ref 3.6–5.5)
POTASSIUM SERPL-SCNC: 4 MMOL/L (ref 3.6–5.5)
POTASSIUM SERPL-SCNC: 4 MMOL/L (ref 3.6–5.5)
POTASSIUM SERPL-SCNC: 4.1 MMOL/L (ref 3.6–5.5)
POTASSIUM SERPL-SCNC: 4.3 MMOL/L (ref 3.6–5.5)
POTASSIUM SERPL-SCNC: 4.3 MMOL/L (ref 3.6–5.5)
POTASSIUM SERPL-SCNC: 4.4 MMOL/L (ref 3.6–5.5)
POTASSIUM SERPL-SCNC: 4.5 MMOL/L (ref 3.6–5.5)
POTASSIUM SERPL-SCNC: 4.7 MMOL/L (ref 3.6–5.5)
POTASSIUM SERPL-SCNC: 4.9 MMOL/L (ref 3.6–5.5)
POTASSIUM SERPL-SCNC: 4.9 MMOL/L (ref 3.6–5.5)
POTASSIUM SERPL-SCNC: 5 MMOL/L (ref 3.6–5.5)
POTASSIUM SERPL-SCNC: 5 MMOL/L (ref 3.6–5.5)
POTASSIUM SERPL-SCNC: 5.2 MMOL/L (ref 3.6–5.5)
PREALB SERPL-MCNC: 5 MG/DL (ref 18–38)
PROCALCITONIN SERPL-MCNC: 1.66 NG/ML
PROT SERPL-MCNC: 5.1 G/DL (ref 6–8.2)
PROT SERPL-MCNC: 5.7 G/DL (ref 6–8.2)
PROT SERPL-MCNC: 6.4 G/DL (ref 6–8.2)
PROT SERPL-MCNC: 6.5 G/DL (ref 6–8.2)
PROT SERPL-MCNC: 6.7 G/DL (ref 6–8.2)
PROT SERPL-MCNC: 6.7 G/DL (ref 6–8.2)
PROT SERPL-MCNC: 6.8 G/DL (ref 6–8.2)
PROT SERPL-MCNC: 6.8 G/DL (ref 6–8.2)
PROT UR QL STRIP: NEGATIVE MG/DL
PROT UR QL STRIP: NEGATIVE MG/DL
PROT UR-MCNC: 17.9 MG/DL (ref 0–15)
PROT UR-MCNC: 22.9 MG/DL (ref 0–15)
PROT UR-MCNC: 9.7 MG/DL (ref 0–15)
PROT/CREAT UR: 124 MG/G (ref 10–107)
PROT/CREAT UR: 158 MG/G (ref 10–107)
PROT/CREAT UR: 212 MG/G (ref 10–107)
PROTHROMBIN TIME: 16.2 SEC (ref 12–14.6)
PROTHROMBIN TIME: 19.7 SEC (ref 12–14.6)
PROTHROMBIN TIME: 20.4 SEC (ref 12–14.6)
PROTHROMBIN TIME: 20.7 SEC (ref 12–14.6)
PTH-INTACT SERPL-MCNC: 190.9 PG/ML (ref 14–72)
PTH-INTACT SERPL-MCNC: 222.9 PG/ML (ref 14–72)
RBC # BLD AUTO: 2.74 M/UL (ref 4.2–5.4)
RBC # BLD AUTO: 3.12 M/UL (ref 4.2–5.4)
RBC # BLD AUTO: 3.15 M/UL (ref 4.2–5.4)
RBC # BLD AUTO: 3.25 M/UL (ref 4.2–5.4)
RBC # BLD AUTO: 3.52 M/UL (ref 4.2–5.4)
RBC # BLD AUTO: 3.69 M/UL (ref 4.2–5.4)
RBC # BLD AUTO: 3.71 M/UL (ref 4.2–5.4)
RBC # BLD AUTO: 3.78 M/UL (ref 4.2–5.4)
RBC # BLD AUTO: 3.83 M/UL (ref 4.2–5.4)
RBC # BLD AUTO: 3.86 M/UL (ref 4.2–5.4)
RBC # BLD AUTO: 3.88 M/UL (ref 4.2–5.4)
RBC # BLD AUTO: 3.9 M/UL (ref 4.2–5.4)
RBC # BLD AUTO: 3.96 M/UL (ref 4.2–5.4)
RBC # BLD AUTO: 4.06 M/UL (ref 4.2–5.4)
RBC # BLD AUTO: 4.09 M/UL (ref 4.2–5.4)
RBC # URNS HPF: ABNORMAL /HPF
RBC # URNS HPF: ABNORMAL /HPF
RBC UR QL AUTO: NEGATIVE
RBC UR QL AUTO: NEGATIVE
RHODAMINE-AURAMINE STN SPEC: NORMAL
SAO2 % BLDA: 89 % (ref 93–99)
SAO2 % BLDA: 93 % (ref 93–99)
SAO2 % BLDA: 97 % (ref 93–99)
SIGNIFICANT IND 70042: ABNORMAL
SIGNIFICANT IND 70042: ABNORMAL
SIGNIFICANT IND 70042: NORMAL
SITE SITE: ABNORMAL
SITE SITE: ABNORMAL
SITE SITE: NORMAL
SODIUM SERPL-SCNC: 131 MMOL/L (ref 135–145)
SODIUM SERPL-SCNC: 131 MMOL/L (ref 135–145)
SODIUM SERPL-SCNC: 132 MMOL/L (ref 135–145)
SODIUM SERPL-SCNC: 134 MMOL/L (ref 135–145)
SODIUM SERPL-SCNC: 135 MMOL/L (ref 135–145)
SODIUM SERPL-SCNC: 136 MMOL/L (ref 135–145)
SODIUM SERPL-SCNC: 137 MMOL/L (ref 135–145)
SODIUM SERPL-SCNC: 138 MMOL/L (ref 135–145)
SODIUM SERPL-SCNC: 139 MMOL/L (ref 135–145)
SODIUM SERPL-SCNC: 140 MMOL/L (ref 135–145)
SOURCE SOURCE: ABNORMAL
SOURCE SOURCE: ABNORMAL
SOURCE SOURCE: NORMAL
SP GR UR STRIP.AUTO: 1.01
SP GR UR STRIP.AUTO: 1.02
SPECIMEN DRAWN FROM PATIENT: ABNORMAL
TIBC SERPL-MCNC: 400 UG/DL (ref 250–450)
TRIGL SERPL-MCNC: 129 MG/DL (ref 0–149)
TRIGL SERPL-MCNC: 69 MG/DL (ref 0–149)
TROPONIN I SERPL-MCNC: 0.03 NG/ML (ref 0–0.04)
TSH SERPL DL<=0.005 MIU/L-ACNC: 0.11 UIU/ML (ref 0.38–5.33)
TSH SERPL DL<=0.005 MIU/L-ACNC: 0.48 UIU/ML (ref 0.38–5.33)
TSH SERPL DL<=0.005 MIU/L-ACNC: 0.59 UIU/ML (ref 0.38–5.33)
TSH SERPL DL<=0.005 MIU/L-ACNC: 1.17 UIU/ML (ref 0.38–5.33)
URATE SERPL-MCNC: 6.1 MG/DL (ref 1.9–8.2)
URATE SERPL-MCNC: 7.4 MG/DL (ref 1.9–8.2)
UROBILINOGEN UR STRIP.AUTO-MCNC: 0.2 MG/DL
UROBILINOGEN UR STRIP.AUTO-MCNC: 0.2 MG/DL
VANCOMYCIN SERPL-MCNC: 34.8 UG/ML
WBC # BLD AUTO: 13.4 K/UL (ref 4.8–10.8)
WBC # BLD AUTO: 16.5 K/UL (ref 4.8–10.8)
WBC # BLD AUTO: 22.1 K/UL (ref 4.8–10.8)
WBC # BLD AUTO: 22.7 K/UL (ref 4.8–10.8)
WBC # BLD AUTO: 5.4 K/UL (ref 4.8–10.8)
WBC # BLD AUTO: 5.4 K/UL (ref 4.8–10.8)
WBC # BLD AUTO: 5.9 K/UL (ref 4.8–10.8)
WBC # BLD AUTO: 6.2 K/UL (ref 4.8–10.8)
WBC # BLD AUTO: 6.3 K/UL (ref 4.8–10.8)
WBC # BLD AUTO: 6.4 K/UL (ref 4.8–10.8)
WBC # BLD AUTO: 6.6 K/UL (ref 4.8–10.8)
WBC # BLD AUTO: 6.7 K/UL (ref 4.8–10.8)
WBC # BLD AUTO: 7 K/UL (ref 4.8–10.8)
WBC # BLD AUTO: 7.8 K/UL (ref 4.8–10.8)
WBC # BLD AUTO: 8.3 K/UL (ref 4.8–10.8)
WBC #/AREA URNS HPF: ABNORMAL /HPF
WBC #/AREA URNS HPF: ABNORMAL /HPF
YEAST BUDDING URNS QL: PRESENT /HPF

## 2018-01-01 PROCEDURE — C1751 CATH, INF, PER/CENT/MIDLINE: HCPCS

## 2018-01-01 PROCEDURE — 665998 HH PPS REVENUE CREDIT

## 2018-01-01 PROCEDURE — P9047 ALBUMIN (HUMAN), 25%, 50ML: HCPCS | Mod: JG | Performed by: HOSPITALIST

## 2018-01-01 PROCEDURE — 93005 ELECTROCARDIOGRAM TRACING: CPT | Performed by: EMERGENCY MEDICINE

## 2018-01-01 PROCEDURE — 99223 1ST HOSP IP/OBS HIGH 75: CPT | Performed by: HOSPITALIST

## 2018-01-01 PROCEDURE — 665999 HH PPS REVENUE DEBIT

## 2018-01-01 PROCEDURE — 99284 EMERGENCY DEPT VISIT MOD MDM: CPT

## 2018-01-01 PROCEDURE — G0299 HHS/HOSPICE OF RN EA 15 MIN: HCPCS

## 2018-01-01 PROCEDURE — 85025 COMPLETE CBC W/AUTO DIFF WBC: CPT | Mod: 91

## 2018-01-01 PROCEDURE — 36415 COLL VENOUS BLD VENIPUNCTURE: CPT

## 2018-01-01 PROCEDURE — 82962 GLUCOSE BLOOD TEST: CPT | Mod: 91

## 2018-01-01 PROCEDURE — 99285 EMERGENCY DEPT VISIT HI MDM: CPT

## 2018-01-01 PROCEDURE — 700102 HCHG RX REV CODE 250 W/ 637 OVERRIDE(OP): Performed by: HOSPITALIST

## 2018-01-01 PROCEDURE — 90662 IIV NO PRSV INCREASED AG IM: CPT | Performed by: HOSPITALIST

## 2018-01-01 PROCEDURE — 82570 ASSAY OF URINE CREATININE: CPT

## 2018-01-01 PROCEDURE — 80069 RENAL FUNCTION PANEL: CPT

## 2018-01-01 PROCEDURE — A9270 NON-COVERED ITEM OR SERVICE: HCPCS | Performed by: HOSPITALIST

## 2018-01-01 PROCEDURE — G0157 HHC PT ASSISTANT EA 15: HCPCS

## 2018-01-01 PROCEDURE — 83735 ASSAY OF MAGNESIUM: CPT

## 2018-01-01 PROCEDURE — 83880 ASSAY OF NATRIURETIC PEPTIDE: CPT

## 2018-01-01 PROCEDURE — 85730 THROMBOPLASTIN TIME PARTIAL: CPT

## 2018-01-01 PROCEDURE — 80053 COMPREHEN METABOLIC PANEL: CPT

## 2018-01-01 PROCEDURE — 700105 HCHG RX REV CODE 258

## 2018-01-01 PROCEDURE — 99291 CRITICAL CARE FIRST HOUR: CPT | Performed by: INTERNAL MEDICINE

## 2018-01-01 PROCEDURE — 302978 HCHG BRONCHOSCOPY-DIAGNOSTIC

## 2018-01-01 PROCEDURE — 84550 ASSAY OF BLOOD/URIC ACID: CPT

## 2018-01-01 PROCEDURE — 85025 COMPLETE CBC W/AUTO DIFF WBC: CPT

## 2018-01-01 PROCEDURE — 84443 ASSAY THYROID STIM HORMONE: CPT

## 2018-01-01 PROCEDURE — A9270 NON-COVERED ITEM OR SERVICE: HCPCS | Performed by: EMERGENCY MEDICINE

## 2018-01-01 PROCEDURE — G0493 RN CARE EA 15 MIN HH/HOSPICE: HCPCS

## 2018-01-01 PROCEDURE — 3E02340 INTRODUCTION OF INFLUENZA VACCINE INTO MUSCLE, PERCUTANEOUS APPROACH: ICD-10-PCS | Performed by: HOSPITALIST

## 2018-01-01 PROCEDURE — 700105 HCHG RX REV CODE 258: Performed by: INTERNAL MEDICINE

## 2018-01-01 PROCEDURE — 700111 HCHG RX REV CODE 636 W/ 250 OVERRIDE (IP): Performed by: INTERNAL MEDICINE

## 2018-01-01 PROCEDURE — 85610 PROTHROMBIN TIME: CPT

## 2018-01-01 PROCEDURE — 86140 C-REACTIVE PROTEIN: CPT

## 2018-01-01 PROCEDURE — 700105 HCHG RX REV CODE 258: Performed by: EMERGENCY MEDICINE

## 2018-01-01 PROCEDURE — G0151 HHCP-SERV OF PT,EA 15 MIN: HCPCS

## 2018-01-01 PROCEDURE — 02HV33Z INSERTION OF INFUSION DEVICE INTO SUPERIOR VENA CAVA, PERCUTANEOUS APPROACH: ICD-10-PCS | Performed by: INTERNAL MEDICINE

## 2018-01-01 PROCEDURE — 99291 CRITICAL CARE FIRST HOUR: CPT | Mod: 25 | Performed by: INTERNAL MEDICINE

## 2018-01-01 PROCEDURE — 700102 HCHG RX REV CODE 250 W/ 637 OVERRIDE(OP): Performed by: INTERNAL MEDICINE

## 2018-01-01 PROCEDURE — 93306 TTE W/DOPPLER COMPLETE: CPT

## 2018-01-01 PROCEDURE — 700101 HCHG RX REV CODE 250

## 2018-01-01 PROCEDURE — 99239 HOSP IP/OBS DSCHRG MGMT >30: CPT | Performed by: HOSPITALIST

## 2018-01-01 PROCEDURE — 96367 TX/PROPH/DG ADDL SEQ IV INF: CPT

## 2018-01-01 PROCEDURE — 93306 TTE W/DOPPLER COMPLETE: CPT | Mod: 26 | Performed by: INTERNAL MEDICINE

## 2018-01-01 PROCEDURE — 82803 BLOOD GASES ANY COMBINATION: CPT

## 2018-01-01 PROCEDURE — 80061 LIPID PANEL: CPT

## 2018-01-01 PROCEDURE — 96365 THER/PROPH/DIAG IV INF INIT: CPT

## 2018-01-01 PROCEDURE — 665001 SOC-HOME HEALTH

## 2018-01-01 PROCEDURE — 700105 HCHG RX REV CODE 258: Performed by: HOSPITALIST

## 2018-01-01 PROCEDURE — 99233 SBSQ HOSP IP/OBS HIGH 50: CPT | Performed by: HOSPITALIST

## 2018-01-01 PROCEDURE — 84134 ASSAY OF PREALBUMIN: CPT

## 2018-01-01 PROCEDURE — 70450 CT HEAD/BRAIN W/O DYE: CPT

## 2018-01-01 PROCEDURE — 700111 HCHG RX REV CODE 636 W/ 250 OVERRIDE (IP): Performed by: HOSPITALIST

## 2018-01-01 PROCEDURE — 84100 ASSAY OF PHOSPHORUS: CPT

## 2018-01-01 PROCEDURE — 700102 HCHG RX REV CODE 250 W/ 637 OVERRIDE(OP): Performed by: EMERGENCY MEDICINE

## 2018-01-01 PROCEDURE — 0B9F8ZX DRAINAGE OF RIGHT LOWER LUNG LOBE, VIA NATURAL OR ARTIFICIAL OPENING ENDOSCOPIC, DIAGNOSTIC: ICD-10-PCS | Performed by: INTERNAL MEDICINE

## 2018-01-01 PROCEDURE — 87205 SMEAR GRAM STAIN: CPT

## 2018-01-01 PROCEDURE — 87015 SPECIMEN INFECT AGNT CONCNTJ: CPT

## 2018-01-01 PROCEDURE — 87070 CULTURE OTHR SPECIMN AEROBIC: CPT

## 2018-01-01 PROCEDURE — 84156 ASSAY OF PROTEIN URINE: CPT

## 2018-01-01 PROCEDURE — 72125 CT NECK SPINE W/O DYE: CPT

## 2018-01-01 PROCEDURE — 87102 FUNGUS ISOLATION CULTURE: CPT

## 2018-01-01 PROCEDURE — 94002 VENT MGMT INPAT INIT DAY: CPT

## 2018-01-01 PROCEDURE — 82962 GLUCOSE BLOOD TEST: CPT

## 2018-01-01 PROCEDURE — 97162 PT EVAL MOD COMPLEX 30 MIN: CPT

## 2018-01-01 PROCEDURE — 770022 HCHG ROOM/CARE - ICU (200)

## 2018-01-01 PROCEDURE — 82306 VITAMIN D 25 HYDROXY: CPT

## 2018-01-01 PROCEDURE — 87086 URINE CULTURE/COLONY COUNT: CPT

## 2018-01-01 PROCEDURE — 31624 DX BRONCHOSCOPE/LAVAGE: CPT | Performed by: INTERNAL MEDICINE

## 2018-01-01 PROCEDURE — 81001 URINALYSIS AUTO W/SCOPE: CPT

## 2018-01-01 PROCEDURE — 80202 ASSAY OF VANCOMYCIN: CPT

## 2018-01-01 PROCEDURE — 99222 1ST HOSP IP/OBS MODERATE 55: CPT | Mod: AI | Performed by: INTERNAL MEDICINE

## 2018-01-01 PROCEDURE — 83605 ASSAY OF LACTIC ACID: CPT

## 2018-01-01 PROCEDURE — 99291 CRITICAL CARE FIRST HOUR: CPT | Performed by: HOSPITALIST

## 2018-01-01 PROCEDURE — 93005 ELECTROCARDIOGRAM TRACING: CPT | Performed by: INTERNAL MEDICINE

## 2018-01-01 PROCEDURE — 83036 HEMOGLOBIN GLYCOSYLATED A1C: CPT

## 2018-01-01 PROCEDURE — 85730 THROMBOPLASTIN TIME PARTIAL: CPT | Mod: 91

## 2018-01-01 PROCEDURE — 88305 TISSUE EXAM BY PATHOLOGIST: CPT

## 2018-01-01 PROCEDURE — 31645 BRNCHSC W/THER ASPIR 1ST: CPT | Performed by: INTERNAL MEDICINE

## 2018-01-01 PROCEDURE — 99292 CRITICAL CARE ADDL 30 MIN: CPT | Performed by: INTERNAL MEDICINE

## 2018-01-01 PROCEDURE — G0152 HHCP-SERV OF OT,EA 15 MIN: HCPCS

## 2018-01-01 PROCEDURE — 31500 INSERT EMERGENCY AIRWAY: CPT

## 2018-01-01 PROCEDURE — 99231 SBSQ HOSP IP/OBS SF/LOW 25: CPT | Performed by: HOSPITALIST

## 2018-01-01 PROCEDURE — 83540 ASSAY OF IRON: CPT

## 2018-01-01 PROCEDURE — 99232 SBSQ HOSP IP/OBS MODERATE 35: CPT | Performed by: HOSPITALIST

## 2018-01-01 PROCEDURE — 71045 X-RAY EXAM CHEST 1 VIEW: CPT

## 2018-01-01 PROCEDURE — 700101 HCHG RX REV CODE 250: Performed by: INTERNAL MEDICINE

## 2018-01-01 PROCEDURE — 87116 MYCOBACTERIA CULTURE: CPT

## 2018-01-01 PROCEDURE — A9270 NON-COVERED ITEM OR SERVICE: HCPCS | Performed by: INTERNAL MEDICINE

## 2018-01-01 PROCEDURE — 770006 HCHG ROOM/CARE - MED/SURG/GYN SEMI*

## 2018-01-01 PROCEDURE — 700111 HCHG RX REV CODE 636 W/ 250 OVERRIDE (IP): Performed by: EMERGENCY MEDICINE

## 2018-01-01 PROCEDURE — 94003 VENT MGMT INPAT SUBQ DAY: CPT

## 2018-01-01 PROCEDURE — 90471 IMMUNIZATION ADMIN: CPT

## 2018-01-01 PROCEDURE — 94760 N-INVAS EAR/PLS OXIMETRY 1: CPT

## 2018-01-01 PROCEDURE — 87040 BLOOD CULTURE FOR BACTERIA: CPT

## 2018-01-01 PROCEDURE — 88112 CYTOPATH CELL ENHANCE TECH: CPT

## 2018-01-01 PROCEDURE — G8978 MOBILITY CURRENT STATUS: HCPCS | Mod: CL

## 2018-01-01 PROCEDURE — 84145 PROCALCITONIN (PCT): CPT

## 2018-01-01 PROCEDURE — 83550 IRON BINDING TEST: CPT

## 2018-01-01 PROCEDURE — 36600 WITHDRAWAL OF ARTERIAL BLOOD: CPT

## 2018-01-01 PROCEDURE — 36556 INSERT NON-TUNNEL CV CATH: CPT

## 2018-01-01 PROCEDURE — 87502 INFLUENZA DNA AMP PROBE: CPT

## 2018-01-01 PROCEDURE — 94667 MNPJ CHEST WALL 1ST: CPT

## 2018-01-01 PROCEDURE — 0BH17EZ INSERTION OF ENDOTRACHEAL AIRWAY INTO TRACHEA, VIA NATURAL OR ARTIFICIAL OPENING: ICD-10-PCS | Performed by: INTERNAL MEDICINE

## 2018-01-01 PROCEDURE — 83970 ASSAY OF PARATHORMONE: CPT

## 2018-01-01 PROCEDURE — 80048 BASIC METABOLIC PNL TOTAL CA: CPT

## 2018-01-01 PROCEDURE — 87641 MR-STAPH DNA AMP PROBE: CPT

## 2018-01-01 PROCEDURE — G8979 MOBILITY GOAL STATUS: HCPCS | Mod: CJ

## 2018-01-01 PROCEDURE — 71046 X-RAY EXAM CHEST 2 VIEWS: CPT

## 2018-01-01 PROCEDURE — 94640 AIRWAY INHALATION TREATMENT: CPT

## 2018-01-01 PROCEDURE — 84484 ASSAY OF TROPONIN QUANT: CPT

## 2018-01-01 PROCEDURE — 84478 ASSAY OF TRIGLYCERIDES: CPT

## 2018-01-01 PROCEDURE — 36556 INSERT NON-TUNNEL CV CATH: CPT | Mod: RT | Performed by: INTERNAL MEDICINE

## 2018-01-01 PROCEDURE — 82043 UR ALBUMIN QUANTITATIVE: CPT

## 2018-01-01 PROCEDURE — 700111 HCHG RX REV CODE 636 W/ 250 OVERRIDE (IP): Mod: JG | Performed by: HOSPITALIST

## 2018-01-01 PROCEDURE — 770020 HCHG ROOM/CARE - TELE (206)

## 2018-01-01 PROCEDURE — 93010 ELECTROCARDIOGRAM REPORT: CPT | Performed by: INTERNAL MEDICINE

## 2018-01-01 PROCEDURE — 76775 US EXAM ABDO BACK WALL LIM: CPT

## 2018-01-01 PROCEDURE — 87106 FUNGI IDENTIFICATION YEAST: CPT

## 2018-01-01 PROCEDURE — 90670 PCV13 VACCINE IM: CPT | Performed by: HOSPITALIST

## 2018-01-01 PROCEDURE — 87206 SMEAR FLUORESCENT/ACID STAI: CPT

## 2018-01-01 PROCEDURE — 31500 INSERT EMERGENCY AIRWAY: CPT | Performed by: INTERNAL MEDICINE

## 2018-01-01 PROCEDURE — 3E0234Z INTRODUCTION OF SERUM, TOXOID AND VACCINE INTO MUSCLE, PERCUTANEOUS APPROACH: ICD-10-PCS | Performed by: HOSPITALIST

## 2018-01-01 PROCEDURE — 5A1945Z RESPIRATORY VENTILATION, 24-96 CONSECUTIVE HOURS: ICD-10-PCS | Performed by: INTERNAL MEDICINE

## 2018-01-01 PROCEDURE — 82728 ASSAY OF FERRITIN: CPT

## 2018-01-01 RX ORDER — AMOXICILLIN 250 MG
2 CAPSULE ORAL 2 TIMES DAILY
Status: DISCONTINUED | OUTPATIENT
Start: 2018-01-01 | End: 2018-01-01

## 2018-01-01 RX ORDER — POTASSIUM CHLORIDE 20 MEQ/1
40 TABLET, EXTENDED RELEASE ORAL ONCE
Status: COMPLETED | OUTPATIENT
Start: 2018-01-01 | End: 2018-01-01

## 2018-01-01 RX ORDER — OMEPRAZOLE 20 MG/1
20 CAPSULE, DELAYED RELEASE ORAL 2 TIMES DAILY
Status: DISCONTINUED | OUTPATIENT
Start: 2018-01-01 | End: 2018-01-01

## 2018-01-01 RX ORDER — ROPINIROLE 2 MG/1
4 TABLET, FILM COATED ORAL EVERY MORNING
Status: DISCONTINUED | OUTPATIENT
Start: 2018-01-01 | End: 2018-01-01

## 2018-01-01 RX ORDER — ONDANSETRON 4 MG/1
8 TABLET, ORALLY DISINTEGRATING ORAL EVERY 8 HOURS PRN
Status: DISCONTINUED | OUTPATIENT
Start: 2018-01-01 | End: 2019-01-01 | Stop reason: HOSPADM

## 2018-01-01 RX ORDER — FUROSEMIDE 10 MG/ML
40 INJECTION INTRAMUSCULAR; INTRAVENOUS ONCE
Status: COMPLETED | OUTPATIENT
Start: 2018-01-01 | End: 2018-01-01

## 2018-01-01 RX ORDER — ONDANSETRON 2 MG/ML
4 INJECTION INTRAMUSCULAR; INTRAVENOUS EVERY 4 HOURS PRN
Status: DISCONTINUED | OUTPATIENT
Start: 2018-01-01 | End: 2018-01-01

## 2018-01-01 RX ORDER — POLYETHYLENE GLYCOL 3350 17 G/17G
1 POWDER, FOR SOLUTION ORAL
Status: DISCONTINUED | OUTPATIENT
Start: 2018-01-01 | End: 2018-01-01

## 2018-01-01 RX ORDER — OMEPRAZOLE 20 MG/1
20 CAPSULE, DELAYED RELEASE ORAL DAILY
Status: DISCONTINUED | OUTPATIENT
Start: 2018-01-01 | End: 2018-01-01 | Stop reason: HOSPADM

## 2018-01-01 RX ORDER — FLECAINIDE ACETATE 100 MG/1
100 TABLET ORAL 2 TIMES DAILY
Status: DISCONTINUED | OUTPATIENT
Start: 2018-01-01 | End: 2018-01-01 | Stop reason: HOSPADM

## 2018-01-01 RX ORDER — LEVOTHYROXINE SODIUM 112 UG/1
112 TABLET ORAL
COMMUNITY

## 2018-01-01 RX ORDER — LORAZEPAM 2 MG/ML
1 CONCENTRATE ORAL
Status: DISCONTINUED | OUTPATIENT
Start: 2018-01-01 | End: 2019-01-01 | Stop reason: HOSPADM

## 2018-01-01 RX ORDER — DABIGATRAN ETEXILATE 75 MG/1
75 CAPSULE ORAL 2 TIMES DAILY
Status: DISCONTINUED | OUTPATIENT
Start: 2018-01-01 | End: 2018-01-01 | Stop reason: HOSPADM

## 2018-01-01 RX ORDER — FERROUS SULFATE 325(65) MG
325 TABLET ORAL DAILY
Status: DISCONTINUED | OUTPATIENT
Start: 2018-01-01 | End: 2018-01-01

## 2018-01-01 RX ORDER — IPRATROPIUM BROMIDE AND ALBUTEROL SULFATE 2.5; .5 MG/3ML; MG/3ML
3 SOLUTION RESPIRATORY (INHALATION)
Status: DISCONTINUED | OUTPATIENT
Start: 2018-01-01 | End: 2018-01-01

## 2018-01-01 RX ORDER — DEXTROSE MONOHYDRATE 25 G/50ML
25 INJECTION, SOLUTION INTRAVENOUS
Status: DISCONTINUED | OUTPATIENT
Start: 2018-01-01 | End: 2018-01-01 | Stop reason: HOSPADM

## 2018-01-01 RX ORDER — ALLOPURINOL 100 MG/1
100 TABLET ORAL EVERY MORNING
Status: DISCONTINUED | OUTPATIENT
Start: 2018-01-01 | End: 2018-01-01

## 2018-01-01 RX ORDER — LORAZEPAM 2 MG/ML
1 INJECTION INTRAMUSCULAR
Status: DISCONTINUED | OUTPATIENT
Start: 2018-01-01 | End: 2019-01-01 | Stop reason: HOSPADM

## 2018-01-01 RX ORDER — LISINOPRIL 10 MG/1
10 TABLET ORAL EVERY MORNING
Qty: 30 TAB
Start: 2018-01-01

## 2018-01-01 RX ORDER — DABIGATRAN ETEXILATE 75 MG/1
75 CAPSULE ORAL 2 TIMES DAILY
COMMUNITY

## 2018-01-01 RX ORDER — HEPARIN SODIUM 1000 [USP'U]/ML
3200 INJECTION, SOLUTION INTRAVENOUS; SUBCUTANEOUS PRN
Status: DISCONTINUED | OUTPATIENT
Start: 2018-01-01 | End: 2018-01-01

## 2018-01-01 RX ORDER — PHENYLEPHRINE HCL IN 0.9% NACL 0.5 MG/5ML
SYRINGE (ML) INTRAVENOUS
Status: ACTIVE
Start: 2018-01-01 | End: 2018-01-01

## 2018-01-01 RX ORDER — BENZONATATE 100 MG/1
100 CAPSULE ORAL 3 TIMES DAILY PRN
Status: DISCONTINUED | OUTPATIENT
Start: 2018-01-01 | End: 2018-01-01

## 2018-01-01 RX ORDER — LEVOTHYROXINE SODIUM 112 UG/1
112 TABLET ORAL
Status: DISCONTINUED | OUTPATIENT
Start: 2018-01-01 | End: 2018-01-01

## 2018-01-01 RX ORDER — SODIUM CHLORIDE 9 MG/ML
INJECTION, SOLUTION INTRAVENOUS CONTINUOUS
Status: DISCONTINUED | OUTPATIENT
Start: 2018-01-01 | End: 2018-01-01

## 2018-01-01 RX ORDER — BENZONATATE 100 MG/1
100 CAPSULE ORAL 3 TIMES DAILY PRN
COMMUNITY

## 2018-01-01 RX ORDER — LISINOPRIL 10 MG/1
10 TABLET ORAL EVERY EVENING
Status: DISCONTINUED | OUTPATIENT
Start: 2018-01-01 | End: 2018-01-01 | Stop reason: HOSPADM

## 2018-01-01 RX ORDER — FLECAINIDE ACETATE 50 MG/1
50 TABLET ORAL 2 TIMES DAILY
Status: DISCONTINUED | OUTPATIENT
Start: 2018-01-01 | End: 2018-01-01

## 2018-01-01 RX ORDER — METOLAZONE 2.5 MG/1
2.5 TABLET ORAL
Status: ON HOLD | COMMUNITY
End: 2018-01-01

## 2018-01-01 RX ORDER — DOCUSATE SODIUM 50 MG/5ML
100 LIQUID ORAL 2 TIMES DAILY
Status: DISCONTINUED | OUTPATIENT
Start: 2018-01-01 | End: 2018-01-01

## 2018-01-01 RX ORDER — ETOMIDATE 2 MG/ML
20 INJECTION INTRAVENOUS ONCE
Status: COMPLETED | OUTPATIENT
Start: 2018-01-01 | End: 2018-01-01

## 2018-01-01 RX ORDER — ROPINIROLE 4 MG/1
4 TABLET, FILM COATED ORAL EVERY MORNING
COMMUNITY

## 2018-01-01 RX ORDER — ATORVASTATIN CALCIUM 20 MG/1
40 TABLET, FILM COATED ORAL NIGHTLY
Status: DISCONTINUED | OUTPATIENT
Start: 2018-01-01 | End: 2018-01-01 | Stop reason: HOSPADM

## 2018-01-01 RX ORDER — ROCURONIUM BROMIDE 10 MG/ML
70 INJECTION, SOLUTION INTRAVENOUS ONCE
Status: COMPLETED | OUTPATIENT
Start: 2018-01-01 | End: 2018-01-01

## 2018-01-01 RX ORDER — AMLODIPINE BESYLATE 5 MG/1
5 TABLET ORAL EVERY EVENING
Status: DISCONTINUED | OUTPATIENT
Start: 2018-01-01 | End: 2018-01-01

## 2018-01-01 RX ORDER — SODIUM CHLORIDE 9 MG/ML
INJECTION, SOLUTION INTRAVENOUS
Status: COMPLETED
Start: 2018-01-01 | End: 2018-01-01

## 2018-01-01 RX ORDER — SODIUM CHLORIDE 9 MG/ML
INJECTION, SOLUTION INTRAVENOUS
Status: ACTIVE
Start: 2018-01-01 | End: 2018-01-01

## 2018-01-01 RX ORDER — BISACODYL 10 MG
10 SUPPOSITORY, RECTAL RECTAL
Status: DISCONTINUED | OUTPATIENT
Start: 2018-01-01 | End: 2018-01-01

## 2018-01-01 RX ORDER — ROPINIROLE 2 MG/1
4 TABLET, FILM COATED ORAL NIGHTLY
Status: DISCONTINUED | OUTPATIENT
Start: 2018-01-01 | End: 2018-01-01 | Stop reason: HOSPADM

## 2018-01-01 RX ORDER — MORPHINE SULFATE 10 MG/ML
10 INJECTION, SOLUTION INTRAMUSCULAR; INTRAVENOUS
Status: DISCONTINUED | OUTPATIENT
Start: 2018-01-01 | End: 2019-01-01 | Stop reason: HOSPADM

## 2018-01-01 RX ORDER — LISINOPRIL 10 MG/1
10 TABLET ORAL EVERY MORNING
Status: DISCONTINUED | OUTPATIENT
Start: 2018-01-01 | End: 2018-01-01

## 2018-01-01 RX ORDER — FLUOXETINE HYDROCHLORIDE 20 MG/1
40 CAPSULE ORAL DAILY
Status: DISCONTINUED | OUTPATIENT
Start: 2018-01-01 | End: 2018-01-01 | Stop reason: HOSPADM

## 2018-01-01 RX ORDER — ONDANSETRON 4 MG/1
4 TABLET, ORALLY DISINTEGRATING ORAL EVERY 4 HOURS PRN
Status: DISCONTINUED | OUTPATIENT
Start: 2018-01-01 | End: 2018-01-01

## 2018-01-01 RX ORDER — ACETAMINOPHEN 325 MG/1
650 TABLET ORAL EVERY 6 HOURS PRN
Status: DISCONTINUED | OUTPATIENT
Start: 2018-01-01 | End: 2018-01-01

## 2018-01-01 RX ORDER — LIDOCAINE HYDROCHLORIDE 20 MG/ML
2 INJECTION, SOLUTION INFILTRATION; PERINEURAL PRN
Status: DISCONTINUED | OUTPATIENT
Start: 2018-01-01 | End: 2018-01-01

## 2018-01-01 RX ORDER — FLUOXETINE HYDROCHLORIDE 20 MG/1
40 CAPSULE ORAL EVERY MORNING
Status: DISCONTINUED | OUTPATIENT
Start: 2018-01-01 | End: 2018-01-01

## 2018-01-01 RX ORDER — MORPHINE SULFATE 100 MG/5ML
10 SOLUTION ORAL
Status: DISCONTINUED | OUTPATIENT
Start: 2018-01-01 | End: 2019-01-01 | Stop reason: HOSPADM

## 2018-01-01 RX ORDER — CLONIDINE HYDROCHLORIDE 0.1 MG/1
0.2 TABLET ORAL 2 TIMES DAILY
Status: DISCONTINUED | OUTPATIENT
Start: 2018-01-01 | End: 2018-01-01

## 2018-01-01 RX ORDER — INSULIN GLARGINE 100 [IU]/ML
35 INJECTION, SOLUTION SUBCUTANEOUS EVERY EVENING
Status: DISCONTINUED | OUTPATIENT
Start: 2018-01-01 | End: 2018-01-01

## 2018-01-01 RX ORDER — DEXTROSE MONOHYDRATE 25 G/50ML
25 INJECTION, SOLUTION INTRAVENOUS
Status: DISCONTINUED | OUTPATIENT
Start: 2018-01-01 | End: 2018-01-01

## 2018-01-01 RX ORDER — AMLODIPINE BESYLATE 10 MG/1
5 TABLET ORAL
Status: DISCONTINUED | OUTPATIENT
Start: 2018-01-01 | End: 2018-01-01

## 2018-01-01 RX ORDER — DABIGATRAN ETEXILATE 75 MG/1
75 CAPSULE ORAL 2 TIMES DAILY
Status: DISCONTINUED | OUTPATIENT
Start: 2018-01-01 | End: 2018-01-01

## 2018-01-01 RX ORDER — ATROPINE SULFATE 10 MG/ML
2 SOLUTION/ DROPS OPHTHALMIC EVERY 4 HOURS PRN
Status: DISCONTINUED | OUTPATIENT
Start: 2018-01-01 | End: 2019-01-01 | Stop reason: HOSPADM

## 2018-01-01 RX ORDER — LISINOPRIL 10 MG/1
10 TABLET ORAL EVERY MORNING
Status: ON HOLD | COMMUNITY
End: 2018-01-01

## 2018-01-01 RX ORDER — HEPARIN SODIUM 5000 [USP'U]/ML
5000 INJECTION, SOLUTION INTRAVENOUS; SUBCUTANEOUS EVERY 8 HOURS
Status: DISCONTINUED | OUTPATIENT
Start: 2018-01-01 | End: 2018-01-01

## 2018-01-01 RX ORDER — INSULIN GLARGINE 100 [IU]/ML
20 INJECTION, SOLUTION SUBCUTANEOUS NIGHTLY
Status: DISCONTINUED | OUTPATIENT
Start: 2018-01-01 | End: 2018-01-01

## 2018-01-01 RX ORDER — LEVOTHYROXINE SODIUM 112 UG/1
112 TABLET ORAL
Status: DISCONTINUED | OUTPATIENT
Start: 2018-01-01 | End: 2018-01-01 | Stop reason: HOSPADM

## 2018-01-01 RX ORDER — OMEPRAZOLE 20 MG/1
20 CAPSULE, DELAYED RELEASE ORAL EVERY MORNING
Status: DISCONTINUED | OUTPATIENT
Start: 2018-01-01 | End: 2018-01-01

## 2018-01-01 RX ORDER — ONDANSETRON 2 MG/ML
8 INJECTION INTRAMUSCULAR; INTRAVENOUS EVERY 8 HOURS PRN
Status: DISCONTINUED | OUTPATIENT
Start: 2018-01-01 | End: 2019-01-01 | Stop reason: HOSPADM

## 2018-01-01 RX ORDER — NOREPINEPHRINE BITARTRATE 1 MG/ML
INJECTION, SOLUTION INTRAVENOUS
Status: COMPLETED
Start: 2018-01-01 | End: 2018-01-01

## 2018-01-01 RX ORDER — INSULIN GLARGINE 100 [IU]/ML
30 INJECTION, SOLUTION SUBCUTANEOUS NIGHTLY
Status: DISCONTINUED | OUTPATIENT
Start: 2018-01-01 | End: 2018-01-01 | Stop reason: HOSPADM

## 2018-01-01 RX ORDER — FLECAINIDE ACETATE 100 MG/1
100 TABLET ORAL 2 TIMES DAILY
Status: DISCONTINUED | OUTPATIENT
Start: 2018-01-01 | End: 2018-01-01

## 2018-01-01 RX ORDER — ALBUMIN (HUMAN) 12.5 G/50ML
25 SOLUTION INTRAVENOUS EVERY 6 HOURS
Status: COMPLETED | OUTPATIENT
Start: 2018-01-01 | End: 2018-01-01

## 2018-01-01 RX ORDER — BISACODYL 10 MG
10 SUPPOSITORY, RECTAL RECTAL
Status: DISCONTINUED | OUTPATIENT
Start: 2018-01-01 | End: 2018-01-01 | Stop reason: HOSPADM

## 2018-01-01 RX ORDER — METHYLPREDNISOLONE SODIUM SUCCINATE 125 MG/2ML
62.5 INJECTION, POWDER, LYOPHILIZED, FOR SOLUTION INTRAMUSCULAR; INTRAVENOUS EVERY 6 HOURS
Status: DISCONTINUED | OUTPATIENT
Start: 2018-01-01 | End: 2018-01-01

## 2018-01-01 RX ORDER — LINEZOLID 2 MG/ML
600 INJECTION, SOLUTION INTRAVENOUS EVERY 12 HOURS
Status: DISCONTINUED | OUTPATIENT
Start: 2018-01-01 | End: 2018-01-01

## 2018-01-01 RX ORDER — AMOXICILLIN 250 MG
2 CAPSULE ORAL 2 TIMES DAILY
Status: DISCONTINUED | OUTPATIENT
Start: 2018-01-01 | End: 2018-01-01 | Stop reason: HOSPADM

## 2018-01-01 RX ORDER — BISACODYL 5 MG
5 TABLET, DELAYED RELEASE (ENTERIC COATED) ORAL
COMMUNITY

## 2018-01-01 RX ORDER — MORPHINE SULFATE 10 MG/ML
5 INJECTION, SOLUTION INTRAMUSCULAR; INTRAVENOUS
Status: DISCONTINUED | OUTPATIENT
Start: 2018-01-01 | End: 2019-01-01 | Stop reason: HOSPADM

## 2018-01-01 RX ORDER — AMLODIPINE BESYLATE 10 MG/1
10 TABLET ORAL EVERY EVENING
Status: DISCONTINUED | OUTPATIENT
Start: 2018-01-01 | End: 2018-01-01 | Stop reason: HOSPADM

## 2018-01-01 RX ORDER — GUAIFENESIN/DEXTROMETHORPHAN 100-10MG/5
10 SYRUP ORAL EVERY 6 HOURS PRN
Status: DISCONTINUED | OUTPATIENT
Start: 2018-01-01 | End: 2018-01-01

## 2018-01-01 RX ORDER — MAGNESIUM SULFATE HEPTAHYDRATE 40 MG/ML
4 INJECTION, SOLUTION INTRAVENOUS ONCE
Status: COMPLETED | OUTPATIENT
Start: 2018-01-01 | End: 2018-01-01

## 2018-01-01 RX ORDER — ACETAMINOPHEN 325 MG/1
650 TABLET ORAL EVERY 6 HOURS PRN
Status: DISCONTINUED | OUTPATIENT
Start: 2018-01-01 | End: 2018-01-01 | Stop reason: HOSPADM

## 2018-01-01 RX ORDER — BISACODYL 5 MG
5 TABLET, DELAYED RELEASE (ENTERIC COATED) ORAL
Status: DISCONTINUED | OUTPATIENT
Start: 2018-01-01 | End: 2018-01-01

## 2018-01-01 RX ORDER — SODIUM CHLORIDE, SODIUM LACTATE, POTASSIUM CHLORIDE, CALCIUM CHLORIDE 600; 310; 30; 20 MG/100ML; MG/100ML; MG/100ML; MG/100ML
INJECTION, SOLUTION INTRAVENOUS
Status: COMPLETED
Start: 2018-01-01 | End: 2018-01-01

## 2018-01-01 RX ORDER — POLYETHYLENE GLYCOL 3350 17 G/17G
1 POWDER, FOR SOLUTION ORAL
Status: DISCONTINUED | OUTPATIENT
Start: 2018-01-01 | End: 2018-01-01 | Stop reason: HOSPADM

## 2018-01-01 RX ORDER — AZITHROMYCIN 500 MG/1
500 INJECTION, POWDER, LYOPHILIZED, FOR SOLUTION INTRAVENOUS ONCE
Status: COMPLETED | OUTPATIENT
Start: 2018-01-01 | End: 2018-01-01

## 2018-01-01 RX ORDER — ALLOPURINOL 100 MG/1
100 TABLET ORAL DAILY
Status: DISCONTINUED | OUTPATIENT
Start: 2018-01-01 | End: 2018-01-01 | Stop reason: HOSPADM

## 2018-01-01 RX ORDER — SODIUM CHLORIDE, SODIUM LACTATE, POTASSIUM CHLORIDE, AND CALCIUM CHLORIDE .6; .31; .03; .02 G/100ML; G/100ML; G/100ML; G/100ML
1000 INJECTION, SOLUTION INTRAVENOUS ONCE
Status: COMPLETED | OUTPATIENT
Start: 2018-01-01 | End: 2018-01-01

## 2018-01-01 RX ORDER — CLONIDINE HYDROCHLORIDE 0.2 MG/1
0.2 TABLET ORAL 2 TIMES DAILY
Status: ON HOLD | COMMUNITY
End: 2018-01-01

## 2018-01-01 RX ORDER — FLUOXETINE 10 MG/1
10 CAPSULE ORAL ONCE
Status: DISCONTINUED | OUTPATIENT
Start: 2018-01-01 | End: 2018-01-01

## 2018-01-01 RX ORDER — SODIUM CHLORIDE 9 MG/ML
250 INJECTION, SOLUTION INTRAVENOUS ONCE
Status: COMPLETED | OUTPATIENT
Start: 2018-01-01 | End: 2018-01-01

## 2018-01-01 RX ORDER — AMLODIPINE BESYLATE 5 MG/1
5 TABLET ORAL EVERY MORNING
Qty: 30 TAB
Start: 2018-01-01

## 2018-01-01 RX ADMIN — LISINOPRIL 10 MG: 5 TABLET ORAL at 06:14

## 2018-01-01 RX ADMIN — HEPARIN SODIUM 25000 UNITS: 5000 INJECTION, SOLUTION INTRAVENOUS at 20:48

## 2018-01-01 RX ADMIN — FENTANYL CITRATE 100 MCG/HR: 50 INJECTION, SOLUTION INTRAMUSCULAR; INTRAVENOUS at 17:42

## 2018-01-01 RX ADMIN — FLECAINIDE ACETATE 100 MG: 100 TABLET ORAL at 05:47

## 2018-01-01 RX ADMIN — STANDARDIZED SENNA CONCENTRATE AND DOCUSATE SODIUM 2 TABLET: 8.6; 5 TABLET, FILM COATED ORAL at 09:21

## 2018-01-01 RX ADMIN — INSULIN HUMAN 6 UNITS: 100 INJECTION, SOLUTION PARENTERAL at 06:06

## 2018-01-01 RX ADMIN — ROCURONIUM BROMIDE 70 MG: 10 INJECTION, SOLUTION INTRAVENOUS at 13:42

## 2018-01-01 RX ADMIN — OMEPRAZOLE 20 MG: 20 CAPSULE, DELAYED RELEASE ORAL at 06:15

## 2018-01-01 RX ADMIN — ALLOPURINOL 100 MG: 100 TABLET ORAL at 06:03

## 2018-01-01 RX ADMIN — OMEPRAZOLE 20 MG: 20 CAPSULE, DELAYED RELEASE ORAL at 05:29

## 2018-01-01 RX ADMIN — OMEPRAZOLE 20 MG: 20 CAPSULE, DELAYED RELEASE ORAL at 05:46

## 2018-01-01 RX ADMIN — CEFEPIME 2 G: 2 INJECTION, POWDER, FOR SOLUTION INTRAVENOUS at 16:35

## 2018-01-01 RX ADMIN — IPRATROPIUM BROMIDE AND ALBUTEROL SULFATE 3 ML: .5; 3 SOLUTION RESPIRATORY (INHALATION) at 14:12

## 2018-01-01 RX ADMIN — ALBUMIN (HUMAN) 25 G: 5 SOLUTION INTRAVENOUS at 01:11

## 2018-01-01 RX ADMIN — ACETAMINOPHEN 650 MG: 325 TABLET, FILM COATED ORAL at 13:02

## 2018-01-01 RX ADMIN — IPRATROPIUM BROMIDE AND ALBUTEROL SULFATE 3 ML: .5; 3 SOLUTION RESPIRATORY (INHALATION) at 15:25

## 2018-01-01 RX ADMIN — IPRATROPIUM BROMIDE AND ALBUTEROL SULFATE 3 ML: .5; 3 SOLUTION RESPIRATORY (INHALATION) at 18:48

## 2018-01-01 RX ADMIN — INSULIN HUMAN 3 UNITS: 100 INJECTION, SOLUTION PARENTERAL at 20:40

## 2018-01-01 RX ADMIN — ALBUMIN (HUMAN) 25 G: 5 SOLUTION INTRAVENOUS at 13:27

## 2018-01-01 RX ADMIN — ROPINIROLE HYDROCHLORIDE 4 MG: 2 TABLET, FILM COATED ORAL at 20:45

## 2018-01-01 RX ADMIN — BENZONATATE 100 MG: 100 CAPSULE ORAL at 20:29

## 2018-01-01 RX ADMIN — HEPARIN SODIUM 25000 UNITS: 5000 INJECTION, SOLUTION INTRAVENOUS at 15:44

## 2018-01-01 RX ADMIN — FLECAINIDE ACETATE 100 MG: 100 TABLET ORAL at 20:29

## 2018-01-01 RX ADMIN — IPRATROPIUM BROMIDE AND ALBUTEROL SULFATE 3 ML: .5; 3 SOLUTION RESPIRATORY (INHALATION) at 18:14

## 2018-01-01 RX ADMIN — DABIGATRAN ETEXILATE MESYLATE 75 MG: 75 CAPSULE ORAL at 20:29

## 2018-01-01 RX ADMIN — INSULIN HUMAN 15 UNITS: 100 INJECTION, SUSPENSION SUBCUTANEOUS at 17:23

## 2018-01-01 RX ADMIN — FUROSEMIDE 40 MG: 10 INJECTION, SOLUTION INTRAMUSCULAR; INTRAVENOUS at 11:34

## 2018-01-01 RX ADMIN — ATORVASTATIN CALCIUM 40 MG: 20 TABLET, FILM COATED ORAL at 21:08

## 2018-01-01 RX ADMIN — ROPINIROLE HYDROCHLORIDE 4 MG: 2 TABLET, FILM COATED ORAL at 06:04

## 2018-01-01 RX ADMIN — INSULIN GLARGINE 30 UNITS: 100 INJECTION, SOLUTION SUBCUTANEOUS at 20:27

## 2018-01-01 RX ADMIN — STANDARDIZED SENNA CONCENTRATE AND DOCUSATE SODIUM 2 TABLET: 8.6; 5 TABLET, FILM COATED ORAL at 05:46

## 2018-01-01 RX ADMIN — INFLUENZA A VIRUS A/MICHIGAN/45/2015 X-275 (H1N1) ANTIGEN (FORMALDEHYDE INACTIVATED), INFLUENZA A VIRUS A/SINGAPORE/INFIMH-16-0019/2016 IVR-186 (H3N2) ANTIGEN (FORMALDEHYDE INACTIVATED), AND INFLUENZA B VIRUS B/MARYLAND/15/2016 BX-69A (A B/COLORADO/6/2017-LIKE VIRUS) ANTIGEN (FORMALDEHYDE INACTIVATED) 0.5 ML: 60; 60; 60 INJECTION, SUSPENSION INTRAMUSCULAR at 12:00

## 2018-01-01 RX ADMIN — FENTANYL CITRATE 100 MCG: 50 INJECTION, SOLUTION INTRAMUSCULAR; INTRAVENOUS at 14:15

## 2018-01-01 RX ADMIN — DABIGATRAN ETEXILATE MESYLATE 75 MG: 75 CAPSULE ORAL at 06:15

## 2018-01-01 RX ADMIN — INSULIN GLARGINE 30 UNITS: 100 INJECTION, SOLUTION SUBCUTANEOUS at 21:17

## 2018-01-01 RX ADMIN — STANDARDIZED SENNA CONCENTRATE AND DOCUSATE SODIUM 2 TABLET: 8.6; 5 TABLET, FILM COATED ORAL at 17:53

## 2018-01-01 RX ADMIN — LEVOTHYROXINE SODIUM 112 MCG: 112 TABLET ORAL at 05:10

## 2018-01-01 RX ADMIN — FLUOXETINE HYDROCHLORIDE 40 MG: 20 CAPSULE ORAL at 05:08

## 2018-01-01 RX ADMIN — AZITHROMYCIN MONOHYDRATE 500 MG: 500 INJECTION, POWDER, LYOPHILIZED, FOR SOLUTION INTRAVENOUS at 18:02

## 2018-01-01 RX ADMIN — FLUOXETINE HYDROCHLORIDE 40 MG: 20 CAPSULE ORAL at 06:14

## 2018-01-01 RX ADMIN — POTASSIUM CHLORIDE 40 MEQ: 1500 TABLET, EXTENDED RELEASE ORAL at 02:30

## 2018-01-01 RX ADMIN — ROPINIROLE HYDROCHLORIDE 4 MG: 2 TABLET, FILM COATED ORAL at 20:20

## 2018-01-01 RX ADMIN — NOREPINEPHRINE BITARTRATE 8 MG: 1 INJECTION INTRAVENOUS at 05:00

## 2018-01-01 RX ADMIN — SODIUM CHLORIDE, SODIUM LACTATE, POTASSIUM CHLORIDE, AND CALCIUM CHLORIDE 1000 ML: .6; .31; .03; .02 INJECTION, SOLUTION INTRAVENOUS at 09:35

## 2018-01-01 RX ADMIN — LISINOPRIL 10 MG: 10 TABLET ORAL at 17:22

## 2018-01-01 RX ADMIN — ALLOPURINOL 100 MG: 100 TABLET ORAL at 05:32

## 2018-01-01 RX ADMIN — INSULIN HUMAN 5 UNITS: 100 INJECTION, SOLUTION PARENTERAL at 17:22

## 2018-01-01 RX ADMIN — ACETAMINOPHEN 650 MG: 325 TABLET, FILM COATED ORAL at 17:21

## 2018-01-01 RX ADMIN — LISINOPRIL 10 MG: 10 TABLET ORAL at 17:31

## 2018-01-01 RX ADMIN — FLUOXETINE HYDROCHLORIDE 40 MG: 20 CAPSULE ORAL at 05:29

## 2018-01-01 RX ADMIN — FLUOXETINE HYDROCHLORIDE 40 MG: 20 CAPSULE ORAL at 05:30

## 2018-01-01 RX ADMIN — IPRATROPIUM BROMIDE AND ALBUTEROL SULFATE 3 ML: .5; 3 SOLUTION RESPIRATORY (INHALATION) at 22:31

## 2018-01-01 RX ADMIN — INSULIN HUMAN 1 UNITS: 100 INJECTION, SOLUTION PARENTERAL at 17:28

## 2018-01-01 RX ADMIN — FENTANYL CITRATE 100 MCG: 50 INJECTION, SOLUTION INTRAMUSCULAR; INTRAVENOUS at 13:46

## 2018-01-01 RX ADMIN — ATORVASTATIN CALCIUM 40 MG: 20 TABLET, FILM COATED ORAL at 17:22

## 2018-01-01 RX ADMIN — INSULIN HUMAN 15 UNITS: 100 INJECTION, SUSPENSION SUBCUTANEOUS at 09:42

## 2018-01-01 RX ADMIN — ALLOPURINOL 100 MG: 100 TABLET ORAL at 05:46

## 2018-01-01 RX ADMIN — GUAIFENESIN AND DEXTROMETHORPHAN 10 ML: 100; 10 SYRUP ORAL at 06:15

## 2018-01-01 RX ADMIN — ATORVASTATIN CALCIUM 40 MG: 20 TABLET, FILM COATED ORAL at 20:45

## 2018-01-01 RX ADMIN — FLECAINIDE ACETATE 100 MG: 100 TABLET ORAL at 05:32

## 2018-01-01 RX ADMIN — INSULIN HUMAN 3 UNITS: 100 INJECTION, SOLUTION PARENTERAL at 12:14

## 2018-01-01 RX ADMIN — LEVOTHYROXINE SODIUM 112 MCG: 0.11 TABLET ORAL at 05:47

## 2018-01-01 RX ADMIN — FENTANYL CITRATE 50 MCG: 50 INJECTION, SOLUTION INTRAMUSCULAR; INTRAVENOUS at 22:05

## 2018-01-01 RX ADMIN — ALLOPURINOL 100 MG: 100 TABLET ORAL at 05:29

## 2018-01-01 RX ADMIN — DABIGATRAN ETEXILATE MESYLATE 75 MG: 75 CAPSULE ORAL at 06:23

## 2018-01-01 RX ADMIN — ACETAMINOPHEN 650 MG: 325 TABLET, FILM COATED ORAL at 23:57

## 2018-01-01 RX ADMIN — ACETAMINOPHEN 650 MG: 325 TABLET, FILM COATED ORAL at 09:34

## 2018-01-01 RX ADMIN — VASOPRESSIN 0.03 UNITS/MIN: 20 INJECTION INTRAVENOUS at 19:45

## 2018-01-01 RX ADMIN — GUAIFENESIN AND DEXTROMETHORPHAN 10 ML: 100; 10 SYRUP ORAL at 20:30

## 2018-01-01 RX ADMIN — CEFTRIAXONE SODIUM 2 G: 2 INJECTION, POWDER, FOR SOLUTION INTRAMUSCULAR; INTRAVENOUS at 02:14

## 2018-01-01 RX ADMIN — DIBASIC SODIUM PHOSPHATE, MONOBASIC POTASSIUM PHOSPHATE AND MONOBASIC SODIUM PHOSPHATE 1 TABLET: 852; 155; 130 TABLET ORAL at 09:36

## 2018-01-01 RX ADMIN — STANDARDIZED SENNA CONCENTRATE AND DOCUSATE SODIUM 2 TABLET: 8.6; 5 TABLET, FILM COATED ORAL at 17:21

## 2018-01-01 RX ADMIN — FLECAINIDE ACETATE 100 MG: 100 TABLET ORAL at 06:14

## 2018-01-01 RX ADMIN — INSULIN HUMAN 3 UNITS: 100 INJECTION, SOLUTION PARENTERAL at 05:00

## 2018-01-01 RX ADMIN — NOREPINEPHRINE BITARTRATE 8 MCG/MIN: 1 INJECTION INTRAVENOUS at 08:05

## 2018-01-01 RX ADMIN — SODIUM CHLORIDE, POTASSIUM CHLORIDE, SODIUM LACTATE AND CALCIUM CHLORIDE 1000 ML: 600; 310; 30; 20 INJECTION, SOLUTION INTRAVENOUS at 09:35

## 2018-01-01 RX ADMIN — ROPINIROLE HYDROCHLORIDE 4 MG: 2 TABLET, FILM COATED ORAL at 05:10

## 2018-01-01 RX ADMIN — VASOPRESSIN 0.03 UNITS/MIN: 20 INJECTION INTRAVENOUS at 09:34

## 2018-01-01 RX ADMIN — IPRATROPIUM BROMIDE AND ALBUTEROL SULFATE 3 ML: .5; 3 SOLUTION RESPIRATORY (INHALATION) at 02:21

## 2018-01-01 RX ADMIN — FENTANYL CITRATE 50 MCG: 50 INJECTION, SOLUTION INTRAMUSCULAR; INTRAVENOUS at 00:32

## 2018-01-01 RX ADMIN — LORAZEPAM 1 MG: 2 INJECTION INTRAMUSCULAR at 17:39

## 2018-01-01 RX ADMIN — FLECAINIDE ACETATE 100 MG: 100 TABLET ORAL at 17:32

## 2018-01-01 RX ADMIN — HEPARIN SODIUM 1200 UNITS/HR: 5000 INJECTION, SOLUTION INTRAVENOUS at 18:08

## 2018-01-01 RX ADMIN — AMLODIPINE BESYLATE 10 MG: 10 TABLET ORAL at 17:53

## 2018-01-01 RX ADMIN — ALLOPURINOL 100 MG: 100 TABLET ORAL at 06:18

## 2018-01-01 RX ADMIN — ATORVASTATIN CALCIUM 40 MG: 20 TABLET, FILM COATED ORAL at 20:21

## 2018-01-01 RX ADMIN — ROPINIROLE HYDROCHLORIDE 4 MG: 2 TABLET, FILM COATED ORAL at 21:08

## 2018-01-01 RX ADMIN — VASOPRESSIN 0.03 UNITS/MIN: 20 INJECTION INTRAVENOUS at 08:30

## 2018-01-01 RX ADMIN — FLECAINIDE ACETATE 100 MG: 100 TABLET ORAL at 17:52

## 2018-01-01 RX ADMIN — POLYETHYLENE GLYCOL 3350 1 PACKET: 17 POWDER, FOR SOLUTION ORAL at 08:40

## 2018-01-01 RX ADMIN — ROPINIROLE HYDROCHLORIDE 4 MG: 2 TABLET, FILM COATED ORAL at 21:20

## 2018-01-01 RX ADMIN — AMLODIPINE BESYLATE 5 MG: 5 TABLET ORAL at 17:22

## 2018-01-01 RX ADMIN — VANCOMYCIN HYDROCHLORIDE 1900 MG: 100 INJECTION, POWDER, LYOPHILIZED, FOR SOLUTION INTRAVENOUS at 20:46

## 2018-01-01 RX ADMIN — STANDARDIZED SENNA CONCENTRATE AND DOCUSATE SODIUM 2 TABLET: 8.6; 5 TABLET, FILM COATED ORAL at 17:58

## 2018-01-01 RX ADMIN — OMEPRAZOLE 20 MG: 20 CAPSULE, DELAYED RELEASE ORAL at 06:18

## 2018-01-01 RX ADMIN — DABIGATRAN ETEXILATE MESYLATE 75 MG: 75 CAPSULE ORAL at 17:59

## 2018-01-01 RX ADMIN — FLECAINIDE ACETATE 100 MG: 100 TABLET ORAL at 21:20

## 2018-01-01 RX ADMIN — OMEPRAZOLE 40 MG: 20 CAPSULE, DELAYED RELEASE ORAL at 05:09

## 2018-01-01 RX ADMIN — SODIUM CHLORIDE: 9 INJECTION, SOLUTION INTRAVENOUS at 12:32

## 2018-01-01 RX ADMIN — SODIUM CHLORIDE: 9 INJECTION, SOLUTION INTRAVENOUS at 08:52

## 2018-01-01 RX ADMIN — STANDARDIZED SENNA CONCENTRATE AND DOCUSATE SODIUM 2 TABLET: 8.6; 5 TABLET, FILM COATED ORAL at 05:29

## 2018-01-01 RX ADMIN — LEVOTHYROXINE SODIUM 112 MCG: 0.11 TABLET ORAL at 05:29

## 2018-01-01 RX ADMIN — SODIUM CHLORIDE 250 ML: 9 INJECTION, SOLUTION INTRAVENOUS at 02:13

## 2018-01-01 RX ADMIN — INSULIN GLARGINE 30 UNITS: 100 INJECTION, SOLUTION SUBCUTANEOUS at 21:06

## 2018-01-01 RX ADMIN — LISINOPRIL 10 MG: 10 TABLET ORAL at 17:54

## 2018-01-01 RX ADMIN — HEPARIN SODIUM 950 UNITS/HR: 5000 INJECTION, SOLUTION INTRAVENOUS at 19:46

## 2018-01-01 RX ADMIN — LISINOPRIL 10 MG: 10 TABLET ORAL at 17:51

## 2018-01-01 RX ADMIN — AMLODIPINE BESYLATE 5 MG: 5 TABLET ORAL at 06:14

## 2018-01-01 RX ADMIN — ACETAMINOPHEN 650 MG: 325 TABLET, FILM COATED ORAL at 01:25

## 2018-01-01 RX ADMIN — IPRATROPIUM BROMIDE AND ALBUTEROL SULFATE 3 ML: .5; 3 SOLUTION RESPIRATORY (INHALATION) at 11:10

## 2018-01-01 RX ADMIN — DABIGATRAN ETEXILATE MESYLATE 75 MG: 75 CAPSULE ORAL at 05:31

## 2018-01-01 RX ADMIN — FENTANYL CITRATE 50 MCG/HR: 50 INJECTION, SOLUTION INTRAMUSCULAR; INTRAVENOUS at 02:29

## 2018-01-01 RX ADMIN — LINEZOLID 600 MG: 600 INJECTION, SOLUTION INTRAVENOUS at 03:19

## 2018-01-01 RX ADMIN — INSULIN HUMAN 1 UNITS: 100 INJECTION, SOLUTION PARENTERAL at 13:12

## 2018-01-01 RX ADMIN — INSULIN HUMAN 1 UNITS: 100 INJECTION, SOLUTION PARENTERAL at 12:46

## 2018-01-01 RX ADMIN — AMLODIPINE BESYLATE 5 MG: 5 TABLET ORAL at 17:31

## 2018-01-01 RX ADMIN — POTASSIUM CHLORIDE 40 MEQ: 1500 TABLET, EXTENDED RELEASE ORAL at 12:24

## 2018-01-01 RX ADMIN — ACETAMINOPHEN 650 MG: 325 TABLET, FILM COATED ORAL at 05:56

## 2018-01-01 RX ADMIN — INSULIN HUMAN 5 UNITS: 100 INJECTION, SOLUTION PARENTERAL at 18:11

## 2018-01-01 RX ADMIN — IPRATROPIUM BROMIDE AND ALBUTEROL SULFATE 3 ML: .5; 3 SOLUTION RESPIRATORY (INHALATION) at 11:01

## 2018-01-01 RX ADMIN — PNEUMOCOCCAL 13-VALENT CONJUGATE VACCINE 0.5 ML: 2.2; 2.2; 2.2; 2.2; 2.2; 4.4; 2.2; 2.2; 2.2; 2.2; 2.2; 2.2; 2.2 INJECTION, SUSPENSION INTRAMUSCULAR at 20:24

## 2018-01-01 RX ADMIN — ACETAMINOPHEN 650 MG: 325 TABLET, FILM COATED ORAL at 08:39

## 2018-01-01 RX ADMIN — INSULIN HUMAN 3 UNITS: 100 INJECTION, SOLUTION PARENTERAL at 01:04

## 2018-01-01 RX ADMIN — CEFEPIME 2 G: 2 INJECTION, POWDER, FOR SOLUTION INTRAVENOUS at 18:07

## 2018-01-01 RX ADMIN — LEVOTHYROXINE SODIUM 112 MCG: 112 TABLET ORAL at 06:05

## 2018-01-01 RX ADMIN — FENTANYL CITRATE 50 MCG: 50 INJECTION, SOLUTION INTRAMUSCULAR; INTRAVENOUS at 20:06

## 2018-01-01 RX ADMIN — INSULIN GLARGINE 30 UNITS: 100 INJECTION, SOLUTION SUBCUTANEOUS at 20:41

## 2018-01-01 RX ADMIN — FLECAINIDE ACETATE 50 MG: 50 TABLET ORAL at 17:21

## 2018-01-01 RX ADMIN — STANDARDIZED SENNA CONCENTRATE AND DOCUSATE SODIUM 2 TABLET: 8.6; 5 TABLET, FILM COATED ORAL at 06:03

## 2018-01-01 RX ADMIN — ALLOPURINOL 100 MG: 100 TABLET ORAL at 06:14

## 2018-01-01 RX ADMIN — LEVOTHYROXINE SODIUM 112 MCG: 0.11 TABLET ORAL at 05:32

## 2018-01-01 RX ADMIN — CEFTRIAXONE SODIUM 1 G: 1 INJECTION, POWDER, FOR SOLUTION INTRAMUSCULAR; INTRAVENOUS at 17:13

## 2018-01-01 RX ADMIN — POLYETHYLENE GLYCOL 3350 1 PACKET: 17 POWDER, FOR SOLUTION ORAL at 17:21

## 2018-01-01 RX ADMIN — ALLOPURINOL 100 MG: 100 TABLET ORAL at 05:08

## 2018-01-01 RX ADMIN — INSULIN HUMAN 15 UNITS: 100 INJECTION, SUSPENSION SUBCUTANEOUS at 05:11

## 2018-01-01 RX ADMIN — INSULIN HUMAN 1 UNITS: 100 INJECTION, SOLUTION PARENTERAL at 12:24

## 2018-01-01 RX ADMIN — IPRATROPIUM BROMIDE AND ALBUTEROL SULFATE 3 ML: .5; 3 SOLUTION RESPIRATORY (INHALATION) at 22:03

## 2018-01-01 RX ADMIN — FLUOXETINE HYDROCHLORIDE 40 MG: 20 CAPSULE ORAL at 05:46

## 2018-01-01 RX ADMIN — FLUOXETINE HYDROCHLORIDE 40 MG: 20 CAPSULE ORAL at 06:18

## 2018-01-01 RX ADMIN — BENZONATATE 100 MG: 100 CAPSULE ORAL at 10:45

## 2018-01-01 RX ADMIN — FENTANYL CITRATE 100 MCG: 50 INJECTION, SOLUTION INTRAMUSCULAR; INTRAVENOUS at 02:18

## 2018-01-01 RX ADMIN — IPRATROPIUM BROMIDE AND ALBUTEROL SULFATE 3 ML: .5; 3 SOLUTION RESPIRATORY (INHALATION) at 10:02

## 2018-01-01 RX ADMIN — SODIUM CHLORIDE: 9 INJECTION, SOLUTION INTRAVENOUS at 20:33

## 2018-01-01 RX ADMIN — OMEPRAZOLE 20 MG: 20 CAPSULE, DELAYED RELEASE ORAL at 05:30

## 2018-01-01 RX ADMIN — FLUOXETINE HYDROCHLORIDE 40 MG: 20 CAPSULE ORAL at 06:00

## 2018-01-01 RX ADMIN — FLECAINIDE ACETATE 100 MG: 100 TABLET ORAL at 06:23

## 2018-01-01 RX ADMIN — IPRATROPIUM BROMIDE AND ALBUTEROL SULFATE 3 ML: .5; 3 SOLUTION RESPIRATORY (INHALATION) at 06:25

## 2018-01-01 RX ADMIN — LINEZOLID 600 MG: 600 INJECTION, SOLUTION INTRAVENOUS at 01:00

## 2018-01-01 RX ADMIN — LINEZOLID 600 MG: 600 INJECTION, SOLUTION INTRAVENOUS at 12:56

## 2018-01-01 RX ADMIN — IPRATROPIUM BROMIDE AND ALBUTEROL SULFATE 3 ML: .5; 3 SOLUTION RESPIRATORY (INHALATION) at 02:27

## 2018-01-01 RX ADMIN — LEVOTHYROXINE SODIUM 112 MCG: 112 TABLET ORAL at 06:14

## 2018-01-01 RX ADMIN — OMEPRAZOLE 40 MG: 20 CAPSULE, DELAYED RELEASE ORAL at 06:04

## 2018-01-01 RX ADMIN — FENTANYL CITRATE 100 MCG: 50 INJECTION, SOLUTION INTRAMUSCULAR; INTRAVENOUS at 01:15

## 2018-01-01 RX ADMIN — FENTANYL CITRATE 100 MCG: 50 INJECTION, SOLUTION INTRAMUSCULAR; INTRAVENOUS at 04:21

## 2018-01-01 RX ADMIN — METHYLPREDNISOLONE SODIUM SUCCINATE 62.5 MG: 125 INJECTION, POWDER, FOR SOLUTION INTRAMUSCULAR; INTRAVENOUS at 11:34

## 2018-01-01 RX ADMIN — ALBUMIN (HUMAN) 25 G: 5 SOLUTION INTRAVENOUS at 17:21

## 2018-01-01 RX ADMIN — INSULIN HUMAN 2 UNITS: 100 INJECTION, SOLUTION PARENTERAL at 20:28

## 2018-01-01 RX ADMIN — FLECAINIDE ACETATE 50 MG: 50 TABLET ORAL at 06:00

## 2018-01-01 RX ADMIN — ACETAMINOPHEN 650 MG: 325 TABLET, FILM COATED ORAL at 09:21

## 2018-01-01 RX ADMIN — FLECAINIDE ACETATE 100 MG: 100 TABLET ORAL at 06:05

## 2018-01-01 RX ADMIN — ROPINIROLE HYDROCHLORIDE 4 MG: 2 TABLET, FILM COATED ORAL at 06:14

## 2018-01-01 RX ADMIN — ETOMIDATE 20 MG: 2 INJECTION INTRAVENOUS at 13:41

## 2018-01-01 RX ADMIN — INSULIN HUMAN 2 UNITS: 100 INJECTION, SOLUTION PARENTERAL at 17:54

## 2018-01-01 RX ADMIN — DABIGATRAN ETEXILATE MESYLATE 75 MG: 75 CAPSULE ORAL at 17:52

## 2018-01-01 RX ADMIN — FLECAINIDE ACETATE 100 MG: 100 TABLET ORAL at 18:02

## 2018-01-01 RX ADMIN — FLECAINIDE ACETATE 100 MG: 100 TABLET ORAL at 17:58

## 2018-01-01 RX ADMIN — AMLODIPINE BESYLATE 10 MG: 10 TABLET ORAL at 17:51

## 2018-01-01 RX ADMIN — MAGNESIUM SULFATE HEPTAHYDRATE 4 G: 40 INJECTION, SOLUTION INTRAVENOUS at 13:03

## 2018-01-01 RX ADMIN — MORPHINE SULFATE 5 MG: 10 INJECTION INTRAVENOUS at 17:39

## 2018-01-01 RX ADMIN — LEVOTHYROXINE SODIUM 112 MCG: 0.11 TABLET ORAL at 06:18

## 2018-01-01 RX ADMIN — INSULIN HUMAN 1 UNITS: 100 INJECTION, SOLUTION PARENTERAL at 21:06

## 2018-01-01 RX ADMIN — SODIUM CHLORIDE 500 ML: 9 INJECTION, SOLUTION INTRAVENOUS at 12:41

## 2018-01-01 RX ADMIN — FLECAINIDE ACETATE 100 MG: 100 TABLET ORAL at 05:29

## 2018-01-01 RX ADMIN — INSULIN HUMAN 6 UNITS: 100 INJECTION, SOLUTION PARENTERAL at 00:26

## 2018-01-01 RX ADMIN — INSULIN HUMAN 1 UNITS: 100 INJECTION, SOLUTION PARENTERAL at 13:22

## 2018-01-01 RX ADMIN — LINEZOLID 600 MG: 600 INJECTION, SOLUTION INTRAVENOUS at 12:00

## 2018-01-01 RX ADMIN — STANDARDIZED SENNA CONCENTRATE AND DOCUSATE SODIUM 2 TABLET: 8.6; 5 TABLET, FILM COATED ORAL at 05:08

## 2018-01-01 RX ADMIN — INSULIN HUMAN 6 UNITS: 100 INJECTION, SOLUTION PARENTERAL at 12:00

## 2018-01-01 RX ADMIN — IPRATROPIUM BROMIDE AND ALBUTEROL SULFATE 3 ML: .5; 3 SOLUTION RESPIRATORY (INHALATION) at 06:19

## 2018-01-01 SDOH — ECONOMIC STABILITY: HOUSING INSECURITY: UNSAFE APPLIANCES: 0

## 2018-01-01 SDOH — ECONOMIC STABILITY: HOUSING INSECURITY: UNSAFE COOKING RANGE AREA: 0

## 2018-01-01 ASSESSMENT — ENCOUNTER SYMPTOMS
SPUTUM PRODUCTION: 0
TINGLING: 1
EYE DISCHARGE: 0
ORTHOPNEA: 0
VOMITING: 0
HEADACHES: 1
SPUTUM PRODUCTION: 1
EYE REDNESS: 0
POLYDIPSIA: 0
FLANK PAIN: 0
VOMITING: 0
TREMORS: 1
CONSTIPATION: 0
EYE DISCHARGE: 0
ABDOMINAL PAIN: 0
SHORTNESS OF BREATH: 1
ORTHOPNEA: 0
BACK PAIN: 0
VOMITING: DENIES
NAUSEA: DENIES
HEARTBURN: 0
EYE PAIN: 0
NERVOUS/ANXIOUS: 0
BLOOD IN STOOL: 0
SPEECH CHANGE: 0
NERVOUS/ANXIOUS: 1
HEMOPTYSIS: 0
MYALGIAS: 0
NAUSEA: PT DENIES ANY NAUSEA AT THIS TIME
SPUTUM PRODUCTION: 0
CONSTIPATION: 0
STRIDOR: 0
PHOTOPHOBIA: 0
EYE DISCHARGE: 0
NAUSEA: 0
SHORTNESS OF BREATH: 0
HALLUCINATIONS: 0
DIZZINESS: 0
LOSS OF CONSCIOUSNESS: 0
POLYDIPSIA: 0
NAUSEA: 0
CLAUDICATION: 0
SINUS PAIN: 0
ORTHOPNEA: 0
CHILLS: 0
SINUS PAIN: 0
PND: 0
PALPITATIONS: 0
SEIZURES: 0
NECK PAIN: 0
DIAPHORESIS: 0
HEADACHES: 0
DOUBLE VISION: 0
FALLS: 1
MYALGIAS: 1
FOCAL WEAKNESS: 0
WHEEZING: 0
SHORTNESS OF BREATH: 0
STRIDOR: 0
PALPITATIONS: 0
FEVER: 0
DEPRESSION: 0
DEPRESSION: 0
CLAUDICATION: 0
BRUISES/BLEEDS EASILY: 0
FLANK PAIN: 0
HALLUCINATIONS: 0
SENSORY CHANGE: 0
HEMOPTYSIS: 0
NAUSEA: DENIES
CHILLS: 1
BLOOD IN STOOL: 0
LOSS OF CONSCIOUSNESS: 0
FEVER: 0
VOMITING: 0
DEPRESSION: 0
LOSS OF CONSCIOUSNESS: 0
DIARRHEA: 0
ORTHOPNEA: 0
WHEEZING: 0
SPUTUM PRODUCTION: 0
SORE THROAT: 0
MYALGIAS: 0
DOUBLE VISION: 0
SINUS PAIN: 0
DIZZINESS: 1
EYE PAIN: 0
FOCAL WEAKNESS: 0
COUGH: 0
FEVER: 0
DEPRESSION: 0
CONSTIPATION: 0
LOSS OF CONSCIOUSNESS: 0
VOMITING: DENIES
HEMOPTYSIS: 0
BACK PAIN: 0
MYALGIAS: 0
POOR JUDGMENT: 1
VOMITING: 0
PND: 0
CHILLS: 0
BLURRED VISION: 0
DIARRHEA: 0
NERVOUS/ANXIOUS: 0
DEPRESSION: 0
NECK PAIN: 0
PALPITATIONS: 0
PALPITATIONS: 0
SPEECH CHANGE: 0
WHEEZING: 0
SEIZURES: 0
HEADACHES: 0
BLURRED VISION: 0
SPUTUM PRODUCTION: 1
MYALGIAS: 0
DIFFICULTY THINKING: 1
NECK PAIN: 0
DIARRHEA: 0
PHOTOPHOBIA: 0
COUGH: 0
CONSTIPATION: 0
COUGH: 0
PALPITATIONS: 0
BRUISES/BLEEDS EASILY: 0
FEVER: 0
WHEEZING: 0
HEADACHES: 1
BLURRED VISION: 0
DOUBLE VISION: 0
PND: 0
BLURRED VISION: 0
FOCAL WEAKNESS: 0
PHOTOPHOBIA: 0
FLANK PAIN: 0
PALPITATIONS: 0
FALLS: 1
HEADACHES: 0
BACK PAIN: 0
CONSTIPATION: 1
NECK PAIN: 0
LOSS OF CONSCIOUSNESS: 0
NAUSEA: DENIES
CHILLS: 0
VOMITING: DENIES
SENSORY CHANGE: 0
BRUISES/BLEEDS EASILY: 0
NAUSEA: DENIES
NAUSEA: DENIES
SINUS PAIN: 0
WHEEZING: 0
FALLS: 1
BRUISES/BLEEDS EASILY: 0
HEMOPTYSIS: 0
SEIZURES: 0
NAUSEA: 0
SPUTUM PRODUCTION: 0
VOMITING: DENIES
DIZZINESS: 1
NAUSEA: 0
BLOOD IN STOOL: 0
ABDOMINAL PAIN: 0
COUGH: 1
EYE PAIN: 0
NECK PAIN: 0
CHILLS: 0
FALLS: 1
BACK PAIN: 0
SHORTNESS OF BREATH: 0
DIARRHEA: 0
ABDOMINAL PAIN: 0
SEIZURES: 0
STRIDOR: 0
NERVOUS/ANXIOUS: 0
EYE REDNESS: 0
HEADACHES: 0
ABDOMINAL PAIN: 0
DIARRHEA: 0
SPEECH CHANGE: 0
DIZZINESS: 1
SHORTNESS OF BREATH: 0
WEAKNESS: 1
DIZZINESS: 0
CONSTIPATION: 0
STRIDOR: 0
SENSORY CHANGE: 0
HEADACHES: 1
ABDOMINAL PAIN: 0
FOCAL WEAKNESS: 0
COUGH: 1
VOMITING: 0
CLAUDICATION: 0
EYE PAIN: 0
CHILLS: 0
FEVER: 1
FLANK PAIN: 0
HEADACHES: 0
FOCAL WEAKNESS: 0
WEIGHT LOSS: 0
NAUSEA: 0
VOMITING: DENIES
TINGLING: 1
WEIGHT LOSS: 0
TREMORS: 1
VOMITING: 0
MEMORY LOSS: 0
SENSORY CHANGE: 0
FEVER: 0
WEIGHT LOSS: 0
NERVOUS/ANXIOUS: 0
CHILLS: 0
CLAUDICATION: 0
TREMORS: 1
SORE THROAT: 0
FEVER: 0
WHEEZING: 0
PHOTOPHOBIA: 0
DIZZINESS: 0
HEARTBURN: 0
BACK PAIN: 0
SEIZURES: 0
HALLUCINATIONS: 0
SPEECH CHANGE: 0
HEADACHES: 0
HALLUCINATIONS: 0
BACK PAIN: 0
SORE THROAT: 0
SPEECH CHANGE: 0
DIAPHORESIS: 1
ABDOMINAL PAIN: 0
VOMITING: 0
FEVER: 0
DOUBLE VISION: 0
WEAKNESS: 0
TINGLING: 1
EYE REDNESS: 0
HEARTBURN: 0
POLYDIPSIA: 0
EYE DISCHARGE: 0
SHORTNESS OF BREATH: 1
SENSORY CHANGE: 0
DIAPHORESIS: 0
DOUBLE VISION: 0
SORE THROAT: 0
NAUSEA: 0

## 2018-01-01 ASSESSMENT — COGNITIVE AND FUNCTIONAL STATUS - GENERAL
MOVING TO AND FROM BED TO CHAIR: UNABLE
DRESSING REGULAR UPPER BODY CLOTHING: A LITTLE
MOBILITY SCORE: 16
WALKING IN HOSPITAL ROOM: A LITTLE
STANDING UP FROM CHAIR USING ARMS: A LITTLE
WALKING IN HOSPITAL ROOM: A LITTLE
SUGGESTED CMS G CODE MODIFIER MOBILITY: CK
TOILETING: A LITTLE
MOVING FROM LYING ON BACK TO SITTING ON SIDE OF FLAT BED: A LITTLE
WALKING IN HOSPITAL ROOM: A LITTLE
HELP NEEDED FOR BATHING: A LITTLE
MOBILITY SCORE: 19
CLIMB 3 TO 5 STEPS WITH RAILING: TOTAL
SUGGESTED CMS G CODE MODIFIER DAILY ACTIVITY: CJ
TURNING FROM BACK TO SIDE WHILE IN FLAT BAD: A LITTLE
SUGGESTED CMS G CODE MODIFIER MOBILITY: CL
CLIMB 3 TO 5 STEPS WITH RAILING: A LITTLE
HELP NEEDED FOR BATHING: A LITTLE
MOVING TO AND FROM BED TO CHAIR: A LITTLE
MOVING TO AND FROM BED TO CHAIR: A LITTLE
SUGGESTED CMS G CODE MODIFIER DAILY ACTIVITY: CJ
SUGGESTED CMS G CODE MODIFIER MOBILITY: CK
MOVING FROM LYING ON BACK TO SITTING ON SIDE OF FLAT BED: UNABLE
DAILY ACTIVITIY SCORE: 21
STANDING UP FROM CHAIR USING ARMS: A LITTLE
MOBILITY SCORE: 14
CLIMB 3 TO 5 STEPS WITH RAILING: A LOT
TOILETING: A LITTLE
STANDING UP FROM CHAIR USING ARMS: A LITTLE
MOVING FROM LYING ON BACK TO SITTING ON SIDE OF FLAT BED: A LITTLE
DAILY ACTIVITIY SCORE: 22

## 2018-01-01 ASSESSMENT — LIFESTYLE VARIABLES
DO YOU DRINK ALCOHOL: NO
EVER_SMOKED: YES
EVER_SMOKED: YES
ALCOHOL_USE: NO
EVER_SMOKED: YES
DO YOU DRINK ALCOHOL: NO
ALCOHOL_USE: NO
SUBSTANCE_ABUSE: 0

## 2018-01-01 ASSESSMENT — PAIN SCALES - GENERAL
PAINLEVEL_OUTOF10: 0
PAINLEVEL_OUTOF10: 4
PAINLEVEL_OUTOF10: 0

## 2018-01-01 ASSESSMENT — CHA2DS2 SCORE
DIABETES: YES
CHA2DS2 VASC SCORE: 5
HYPERTENSION: YES
AGE 75 OR GREATER: YES
AGE 65 TO 74: NO
CHF OR LEFT VENTRICULAR DYSFUNCTION: NO
SEX: FEMALE
VASCULAR DISEASE: NO
PRIOR STROKE OR TIA OR THROMBOEMBOLISM: NO

## 2018-01-01 ASSESSMENT — COPD QUESTIONNAIRES
HAVE YOU SMOKED AT LEAST 100 CIGARETTES IN YOUR ENTIRE LIFE: YES
IN THE PAST 12 MONTHS DO YOU DO LESS THAN YOU USED TO BECAUSE OF YOUR BREATHING PROBLEMS: STRONGLY AGREE
DURING THE PAST 4 WEEKS HOW MUCH DID YOU FEEL SHORT OF BREATH: MOST  OR ALL OF THE TIME
COPD SCREENING SCORE: 7
HAVE YOU SMOKED AT LEAST 100 CIGARETTES IN YOUR ENTIRE LIFE: YES
COPD SCREENING SCORE: 8
DURING THE PAST 4 WEEKS HOW MUCH DID YOU FEEL SHORT OF BREATH: NONE/LITTLE OF THE TIME
DO YOU EVER COUGH UP ANY MUCUS OR PHLEGM?: NO/ONLY WITH OCCASIONAL COLDS OR INFECTIONS
DO YOU EVER COUGH UP ANY MUCUS OR PHLEGM?: YES, A FEW DAYS A WEEK OR MONTH

## 2018-01-01 ASSESSMENT — GAIT ASSESSMENTS
DEVIATION: BRADYKINETIC;SHUFFLED GAIT
GAIT LEVEL OF ASSIST: CONTACT GUARD ASSIST
ASSISTIVE DEVICE: FRONT WHEEL WALKER
DISTANCE (FEET): 50

## 2018-01-01 ASSESSMENT — PATIENT HEALTH QUESTIONNAIRE - PHQ9
2. FEELING DOWN, DEPRESSED, IRRITABLE, OR HOPELESS: NOT AT ALL
CLINICAL INTERPRETATION OF PHQ2 SCORE: 0
1. LITTLE INTEREST OR PLEASURE IN DOING THINGS: NOT AT ALL
SUM OF ALL RESPONSES TO PHQ9 QUESTIONS 1 AND 2: 0
1. LITTLE INTEREST OR PLEASURE IN DOING THINGS: 00
2. FEELING DOWN, DEPRESSED, IRRITABLE, OR HOPELESS: 00
2. FEELING DOWN, DEPRESSED, IRRITABLE, OR HOPELESS: NOT AT ALL
SUM OF ALL RESPONSES TO PHQ9 QUESTIONS 1 AND 2: 0
1. LITTLE INTEREST OR PLEASURE IN DOING THINGS: NOT AT ALL

## 2018-01-01 ASSESSMENT — ACTIVITIES OF DAILY LIVING (ADL)
BATHING_ASSISTIVE_EQUIPMENT_NEEDED: SHOWER CHAIR
TOILETING_EQUIPMENT_USED: HIGH RISE TOILET, GRAB BARS
BATHING_ASSISTIVE_EQUIPMENT_USED: GRAB BARS, HANDHELD SHOWER
HOME_HEALTH_OASIS: 02
ADLS_COMMENTS: <!--EPICS-->SEE OT EVAL<!--EPICE-->
OASIS_M1830: 03
IADLS_COMMENTS: <!--EPICS-->SEE OT EVAL<!--EPICE-->

## 2018-03-19 NOTE — ED TRIAGE NOTES
"Chief Complaint   Patient presents with   • T-5000 GLF     pt bib remsa.  pt states she \"tripped on the leg on the couch,\" pt states she hit her head.  pt denies LOC, dizziness.       Pt states hx of falling 4x in last six months.  Pt hx DM, FSBS 258 in transport.  No lacerations or bruising of head on assessment.  "

## 2018-03-19 NOTE — ED PROVIDER NOTES
"ED Provider Note    CHIEF COMPLAINT  Chief Complaint   Patient presents with   • T-5000 GLF     pt bib remsa.  pt states she \"tripped on the leg on the couch,\" pt states she hit her head.  pt denies LOC, dizziness.         HPI  Cyndi Schwab is a 83 y.o. female who presents to the emergency department after mechanical fall. Patient with a long-standing history of recurrent falls. Tonight she tripped on the couch causing her fall. She fell backwards hitting her head. No loss of consciousness. No neck or back pain. No unilateral symptoms. Patient only called EMS secondary to the fact that she does that she is on blood thinners secondary to her A. fib. Due to the fact that she is on blood thinners EMS also prompted her to be seen in the emergency department for further evaluation. Again patient currently not with any significant complaint other than small soft tissue swelling and area of slight tenderness to the back of her scalp.    REVIEW OF SYSTEMS  See HPI for further details. All other systems are negative.     PAST MEDICAL HISTORY   has a past medical history of Afib (CMS-HCC); Arthritis; Breath shortness; Cancer (CMS-HCC) (2006); CATARACT (2005); Diabetes; Heart valve disease; High cholesterol; Hyperlipidemia; Hypertension; Indigestion; Other specified disorder of intestines; Pain; Parkinson's disease; Pneumonia (0ctober of 2015); Psychiatric problem; Renal disorder; Sleep apnea; Snoring; Stroke (CMS-HCC) (1/2016); Unspecified disorder of thyroid; and Unspecified urinary incontinence.    SOCIAL HISTORY  Social History     Social History Main Topics   • Smoking status: Never Smoker   • Smokeless tobacco: Never Used   • Alcohol use No   • Drug use: No   • Sexual activity: Not on file       SURGICAL HISTORY   has a past surgical history that includes hysterectomy radical (1972); mastectomy (Left, 2006); cataract phaco with iol (Right, 2005); cataract phaco with iol (6/9/2010); cardiac cath (2011); breast biopsy " (5/21/2013); and irrigation & debridement ortho (Right, 11/10/2016).    CURRENT MEDICATIONS  Home Medications    **Home medications have not yet been reviewed for this encounter**         ALLERGIES  Allergies   Allergen Reactions   • Penicillins      Had as a child, unsure of reaction  About 70 years ago       PHYSICAL EXAM  VITAL SIGNS: Pulse (!) 58   Temp 36.9 °C (98.4 °F)   Resp 16   Ht 1.524 m (5')   Wt 84 kg (185 lb 3 oz)   SpO2 96%   BMI 36.17 kg/m²  @FIDE[[id number]::@   Pulse ox interpretation: I interpret this pulse ox as normal.  Constitutional: Alert in no apparent distress.  HENT: 2 x 2 centimeter area of soft tissue swelling and ecchymosis to the left posterior parietal scalp, no bony step-off or deformity. No battles or raccoons. Bilateral external ears normal, Nose normal.   Eyes: Pupils are equal and reactive, Conjunctiva normal, Non-icteric.   Neck: Normal range of motion, No tenderness, Supple, No stridor.   Cardiovascular: Regular rate and rhythm, no murmurs.   Thorax & Lungs: Normal breath sounds, No respiratory distress, No wheezing, No chest tenderness.   Abdomen: Bowel sounds normal, Soft, No tenderness, No masses, No pulsatile masses.   Skin: Warm, Dry, No erythema, No rash.   Back: No bony tenderness, No CVA tenderness.   Extremities: Intact distal pulses, No tenderness    Musculoskeletal: Good range of motion in all major joints. No tenderness to palpation or major deformities noted.   Neurologic: Alert , Normal motor function, Normal sensory function, No focal deficits noted.   Psychiatric: Affect normal, Judgment normal, Mood normal.       DIAGNOSTIC STUDIES / PROCEDURES      RADIOLOGY  CT-CSPINE WITHOUT PLUS RECONS   Final Result      1.  Moderate to severe multilevel degenerative change of cervical spine with associated mild anterolisthesis at C3-4 and C4-5, and reversal of curvature.   2.  No acute cervical spine fracture.   3.  Alignment is unchanged from prior exam.      CT-HEAD W/O    Final Result      1.  Diffuse atrophy and mild white matter changes.   2.  No acute intracranial hemorrhage or territorial infarct.                 COURSE & MEDICAL DECISION MAKING  Pertinent Labs & Imaging studies reviewed. (See chart for details)  Patient presented to the emergency room after mechanical fall. History of physical exam as above. CT imaging negative. We will discharge home.     The patient will not drink alcohol nor drive with prescribed medications. The patient will return for worsening symptoms and is stable at the time of discharge. The patient verbalizes understanding and will comply.    FINAL IMPRESSION  1. Contusion of scalp, initial encounter            Electronically signed by: Mono Silveira, 3/18/2018 8:35 PM

## 2018-03-19 NOTE — DISCHARGE INSTRUCTIONS
Scalp Hematoma  A bruise of the scalp causes swelling from an accumulation of blood in the area of the injury. This is a common problem. This problem is also called a scalp hematoma. This normally is not serious. Usually a scalp hematoma causes only mild local pain or headache. It usually disappears after 2 to 3 days with proper treatment.   You should apply ice packs to the swollen area for 20 to 30 minutes every 2 to 3 hours until the swelling improves. Only take over-the-counter or prescription medicines for pain and discomfort as directed by your caregiver. It is best to avoid aspirin because this may increase bleeding. You may have a mild headache, slight dizziness, nausea, or weakness for the next few days. This usually clears up with rest.   SEEK IMMEDIATE MEDICAL CARE IF:  · You develop severe pain or headache, unrelieved by medicine.  · You develop unusual sleepiness, confusion, personality changes, or difficulty with coordination or walking.  · You develop a persistent nose bleed, double or blurred vision, or unusual drainage from the nose or ear.  · You develop numbness or weakness in the limbs.  Document Released: 01/25/2006 Document Revised: 03/11/2013 Document Reviewed: 11/02/2010  [employer]® Patient Information ©2014 [employer].

## 2018-07-26 NOTE — ED PROVIDER NOTES
ED Provider Note    ED Provider Note    Primary care provider: Lexis Orlando D.O.  Means of arrival: EMS  History obtained from: patient,   History limited by: None    CHIEF COMPLAINT  Chief Complaint   Patient presents with   • GLF     BIB EMS, pt fell while cutting string off of her pants. No LOC, hematoma noted to posterior head       HPI  Cyndi Schwab is a 83 y.o. female who presents to the Emergency Department via EMS after a fall.  Patient has a history of multiple falls, due to poor balance and Parkinson's disease.  She lives in an assisted living center.  She states she was looking down, trying to get a string off her pants and when she turned her head up, she just kept falling backwards.  She denies loss of consciousness.  She complains of no significant headache.  She also has elbow pain and she has a small skin tear to that area but normal range of motion.  She is otherwise been in her normal state of health.    REVIEW OF SYSTEMS  Review of Systems   Constitutional: Negative for fever.   HENT: Negative for congestion.    Eyes: Negative for double vision.   Cardiovascular: Negative for chest pain.   Gastrointestinal: Negative for vomiting.   Musculoskeletal: Positive for falls. Negative for back pain and neck pain.   Neurological: Negative for dizziness and headaches.       PAST MEDICAL HISTORY   has a past medical history of Afib ([facility]); Arthritis; Breath shortness; Cancer ([facility]) (2006); CATARACT (2005); Diabetes; Heart valve disease; High cholesterol; Hyperlipidemia; Hypertension; Indigestion; Other specified disorder of intestines; Pain; Parkinson's disease; Pneumonia (0ctober of 2015); Psychiatric problem; Renal disorder; Sleep apnea; Snoring; Stroke ([facility]) (1/2016); Unspecified disorder of thyroid; and Unspecified urinary incontinence.    SURGICAL HISTORY   has a past surgical history that includes hysterectomy radical (1972); mastectomy (Left, 2006); cataract phaco with iol (Right,  2005); cataract phaco with iol (6/9/2010); cardiac cath (2011); breast biopsy (5/21/2013); and irrigation & debridement ortho (Right, 11/10/2016).    SOCIAL HISTORY  Social History   Substance Use Topics   • Smoking status: Never Smoker   • Smokeless tobacco: Never Used   • Alcohol use No      History   Drug Use No       FAMILY HISTORY  Family History   Problem Relation Age of Onset   • Stroke Mother    • Hypertension Mother        CURRENT MEDICATIONS  Home Medications    **Home medications have not yet been reviewed for this encounter**         ALLERGIES  Allergies   Allergen Reactions   • Penicillins      Had as a child, unsure of reaction  About 70 years ago       PHYSICAL EXAM  VITAL SIGNS: /49   Pulse 76   Temp 36.7 °C (98 °F)   Resp 18   Ht 1.524 m (5')   Wt 77.1 kg (170 lb)   SpO2 98%   BMI 33.20 kg/m²   Vitals reviewed.  Constitutional: Patient is oriented to person, place, and time. Appears well-developed and well-nourished. No distress.    Head: Normocephalic.  Approximately 2 cm contusion without overlying abrasion or laceration, to the right parietal scalp.  No palpable skull fractures.  Ears: Normal external ears bilaterally.   Mouth/Throat: Oropharynx is clear and moist  Eyes: Conjunctivae are normal. Pupils are equal, round, and reactive to light.   Neck: Normal range of motion. Neck supple.  No midline tenderness or step-offs.  Cardiovascular: Normal rate, regular rhythm and normal heart sounds. Normal peripheral pulses.  Pulmonary/Chest: Effort normal and breath sounds normal. No respiratory distress, no wheezes, rhonchi, or rales.   Abdominal: Soft. Bowel sounds are normal. There is no tenderness. No rebound or guarding, or peritoneal signs. No CVA tenderness.   Musculoskeletal: No edema and no tenderness, except as noted. Minor skin tear, right elbow with some bruising.  There is normal range of motion without tenderness to her right elbow.    Neurological: No focal deficits.   Skin:  Skin is warm and dry. No erythema. No pallor.   Psychiatric: Patient has a normal mood and affect.       RADIOLOGY  CT-HEAD W/O   Final Result      No acute intracranial abnormality is identified.      Atrophy      There are periventricular and subcortical white matter changes present.  This finding is nonspecific and could be from previous small vessel ischemia, demyelination, or gliosis.      Right parietal scalp swelling.        The radiologist's interpretation of all radiological studies have been reviewed by me.    COURSE & MEDICAL DECISION MAKING  Pertinent Labs & Imaging studies reviewed. (See chart for details)    Obtained and reviewed past medical records.  Patient's last encounter was in March of this year she was seen after a fall.  She tripped on the couch.  Diagnosed with a scalp contusion.  CT C-spine and head negative at that time, for acute injury.    3:37 PM - Patient seen and examined at bedside.  This is a pleasant 83-year-old patient who presents with her .  Patient lives in an assisted living center secondary to multiple medical problems including poor balance and Parkinson's disease.  She is anticoagulated for atrial fibrillation.  She had a non-syncopal fall earlier today.  She suffered a contusion to her right parietal scalp.  She is awake and alert with no neurologic deficits.  She is at her baseline.  Of advised need for CT imaging.  We discussed x-ray of elbow but she does not think it is appropriate.  She is there is no change in function she has normal range of motion.    4:37 PM patient's reevaluated the bedside.  Daughter now at the bedside as well.  No change in condition.  Patient has no new complaints.  No headache.  We discussed CT findings which show no evidence of fracture.  Or she is again, advised to get help when she needs to move around her assisted living facility.  At this time, I feel she can safely be discharged to home.  She is  given strict return precautions.   She will be discharged home in stable condition      FINAL IMPRESSION  1. Closed head injury, initial encounter    2. Anticoagulated by anticoagulation treatment

## 2018-07-26 NOTE — ED NOTES
Pt ambulatory to restroom with assistance of walker and standby assistance, tolerated well. Pt back to [facility], given warm blanket.

## 2018-07-29 NOTE — ED TRIAGE NOTES
Pt from [facility] by [facility] for increased weakness in R foot/leg, hx CVA with R sided deficits but normally able to move without difficulty, over last 3 days noticed foot drop in R leg. Fall Friday with negative head CT

## 2018-07-29 NOTE — ED NOTES
Pt up to [facility] with stand-by assist. Pt back to bed without incident. Urine sent to lab PRN. Pt repositioned for comfort. Family at side x2.

## 2018-07-29 NOTE — ED NOTES
Discharge instructions reviewed, no questions at this time.    Peripheral IV removed, dressing applied.    Family present to provide transportation.     Belongings returned to patient.    Patient escorted out by wheelchair.

## 2018-07-29 NOTE — ED PROVIDER NOTES
ED Provider Note  CHIEF COMPLAINT  Chief Complaint   Patient presents with   • Unilateral Weakness       HPI  Cyndi Schwab is a 83 y.o. female who presents from a nursing home for evaluation of little bit of weakness in her right arm.  The patient apparently seen here last week and had a CT of her head after a ground-level fall the CT of her head was normal.  She was sent home but was discharged to [facility] side nursing home.  She returns today with this unilateral weakness.  No headache, no chest pain, no difficulty breathing.  No fever chills or cough.    REVIEW OF SYSTEMS  No headache, no chest pain, no difficulty breathing.  No cough.  No abdominal pain.  ALL OTHER SYSTEMS NEGATIVE    ALLERGIES  Allergies   Allergen Reactions   • Penicillins      Had as a child, unsure of reaction  About 70 years ago       PAST MEDICAL HISTORY  Past Medical History:   Diagnosis Date   • Afib ([facility])     pt has implanted monitor(monitors rhythm only). Dr Camilo Angel.    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   • Sleep apnea     CPAP   • Snoring     has cpap,'have not used in awhile'   • Stroke ([facility]) 1/2016    left hip weakness-uses walker   • Unspecified disorder of thyroid     on medication   • Unspecified urinary incontinence     mild incontinence       SURGICAL HISTORY  Past Surgical History:   Procedure Laterality Date   • IRRIGATION & DEBRIDEMENT ORTHO Right 11/10/2016    Procedure:  IRRIGATION & DEBRIDEMENT ORTHO - INFECTED PATELLAR BURSITIS DEBRIDEMENT;  Surgeon: Nick Harrell M.D.;  Location: SURGERY [facility];  Service:    • BREAST BIOPSY  5/21/2013    Performed by Ana Lilia Beach M.D. at SURGERY SAME DAY [facility]   • CARDIAC CATH  2011   • CATARACT PHACO WITH IOL  6/9/2010    left--Performed by ROYAL CONNOLLY at SURGERY SAME DAY [facility]   • MASTECTOMY Left 2006    partial 2006   • CATARACT PHACO WITH IOL Right 2005   • HYSTERECTOMY RADICAL  1972    pre cancerous       FAMILY HISTORY  Family History   Problem Relation Age of Onset   • Stroke Mother    • Hypertension Mother        SOCIAL HISTORY  Patient presents from a nursing home.  Her  is at the bedside.    PHYSICAL EXAM  GENERAL: Alert female adult  VITAL SIGNS: /54   Pulse (!) 53   Temp 36.7 °C (98 °F)   Resp 20   Ht 1.524 m (5')   Wt 77.1 kg (170 lb)   SpO2 93%   BMI 33.20 kg/m²   Constitutional: Alert elderly female adult   HENT: Scalp is normal size and nontender. Ears are clear. Nose is clear. Throat is clear with no stridor no drooling no trismus. Teeth are all intact.  Eyes: Pupils equal round and reactive to light, extraocular motor fall. There is no scleral icterus.  Neck: Neck is supple and nontender. There is no meningismus. No adenitis. No thyromegaly.  Lymphatic: No adenopathy.   Cardiovascular: Heart regular rhythm without murmurs or gallops   Thorax & Lungs: No chest wall tenderness. Lungs are clear. Patient has good breath sounds bilateral. No rales, no rhonchi, no wheezes.  Abdomen: Abdomen is soft, nontender, not rigid, no guarding, and no organomegaly. There is no palpable hernia   Skin: Warm, pink, and dry with no erythema and no rash.   Back: Nontender, no midline bony tenderness, no flank tenderness.                                           Extremities: Full range of motion  No tenderness to palpation and no deformities noted. No calf or thigh swelling. No calf or thigh  tenderness. No clinical DVT.  Neurologic: Alert & oriented . Cranial nerves are grossly intact as tested. Patient moves all 4 extremities well. Patient has good strong flexion and extension of the ankle joints knee joints hip joints and elbow joints. Sensation is normal and symmetrical in the upper and lower extremities.   Psychiatric: Patient is alert oriented coherent and rational.       RADIOLOGY/PROCEDURES  CT-HEAD W/O   Final Result      No acute intracranial abnormality is identified.      Atrophy      There are periventricular and subcortical white matter changes present.  This finding is nonspecific and could be from previous small vessel ischemia, demyelination, or gliosis.         DX-CHEST-PORTABLE (1 VIEW)   Final Result      1.  There are changes most consistent with interstitial pulmonary edema.            COURSE & MEDICAL DECISION MAKING  Patient arrives from a nursing home for evaluation of unilateral weakness.  She had a ground-level fall and a head injury with concussion recently.  Differential diagnosis: Weakness, intracranial event, metabolic issue, anemia, etc.    Plan: #1 IV #2 cardiac monitor, pulse ox monitor, blood pressure monitor.  3.  CT of the head #4.  Lab evaluation including CBC, CMP, pro time, etc.    Laboratory and reexamination: Chest x-ray shows some mild interstitial pulmonary edema.  Patient's breathing comfortably.  O2 sat is normal.  CT of the head shows no acute intracranial process.  She has some old changes and chronic changes.  CBC is normal with no anemia no bandemia.  Creatinine is slightly elevated at 2.3.    Results for orders placed or performed during the hospital encounter of 07/29/18   CBC WITH DIFFERENTIAL   Result Value Ref Range    WBC 6.6 4.8 - 10.8 K/uL    RBC 3.52 (L) 4.20 - 5.40 M/uL    Hemoglobin 11.2 (L) 12.0 - 16.0 g/dL    Hematocrit 33.6 (L) 37.0 - 47.0 %    MCV 95.5 81.4 - 97.8 fL    MCH 31.8 27.0 - 33.0 pg    MCHC 33.3 (L) 33.6 - 35.0 g/dL    RDW 48.4  35.9 - 50.0 fL    Platelet Count 171 164 - 446 K/uL    MPV 10.4 9.0 - 12.9 fL    Neutrophils-Polys 73.10 (H) 44.00 - 72.00 %    Lymphocytes 18.30 (L) 22.00 - 41.00 %    Monocytes 6.90 0.00 - 13.40 %    Eosinophils 1.20 0.00 - 6.90 %    Basophils 0.30 0.00 - 1.80 %    Immature Granulocytes 0.20 0.00 - 0.90 %    Nucleated RBC 0.00 /100 WBC    Neutrophils (Absolute) 4.84 2.00 - 7.15 K/uL    Lymphs (Absolute) 1.21 1.00 - 4.80 K/uL    Monos (Absolute) 0.46 0.00 - 0.85 K/uL    Eos (Absolute) 0.08 0.00 - 0.51 K/uL    Baso (Absolute) 0.02 0.00 - 0.12 K/uL    Immature Granulocytes (abs) 0.01 0.00 - 0.11 K/uL    NRBC (Absolute) 0.00 K/uL   COMP METABOLIC PANEL   Result Value Ref Range    Sodium 134 (L) 135 - 145 mmol/L    Potassium 5.2 3.6 - 5.5 mmol/L    Chloride 101 96 - 112 mmol/L    Co2 24 20 - 33 mmol/L    Anion Gap 9.0 0.0 - 11.9    Glucose 174 (H) 65 - 99 mg/dL    Bun 43 (H) 8 - 22 mg/dL    Creatinine 2.31 (H) 0.50 - 1.40 mg/dL    Calcium 9.0 8.5 - 10.5 mg/dL    AST(SGOT) 28 12 - 45 U/L    ALT(SGPT) 17 2 - 50 U/L    Alkaline Phosphatase 88 30 - 99 U/L    Total Bilirubin 0.4 0.1 - 1.5 mg/dL    Albumin 3.9 3.2 - 4.9 g/dL    Total Protein 6.4 6.0 - 8.2 g/dL    Globulin 2.5 1.9 - 3.5 g/dL    A-G Ratio 1.6 g/dL   PROTHROMBIN TIME   Result Value Ref Range    PT 16.2 (H) 12.0 - 14.6 sec    INR 1.33 (H) 0.87 - 1.13   APTT   Result Value Ref Range    APTT 36.6 (H) 24.7 - 36.0 sec   ESTIMATED GFR   Result Value Ref Range    GFR If  24 (A) >60 mL/min/1.73 m 2    GFR If Non African American 20 (A) >60 mL/min/1.73 m 2      At 3:30 PM the patient is awake and oriented and coherent and rational.  She is in no respiratory distress.  She is alert and smiling.  At this point there is no indication she needs admission to the hospital.  Lately she is stable to go back to the nursing home.  She will return here as needed.  Stable on discharge after 3:30 PM.  FINAL IMPRESSION  1.  Weakness         Electronically signed by:  Gary Gansert, 7/29/2018 /3:30 PM

## 2018-07-30 NOTE — PROGRESS NOTES
Chart reviewed.  Pt was discharged from [facility] to [facility] 7/29/18 and does not qualify for discharge outreach phone call.

## 2018-11-29 PROBLEM — R55 COLLAPSE: Status: ACTIVE | Noted: 2018-01-01

## 2018-11-29 PROBLEM — N17.9 AKI (ACUTE KIDNEY INJURY) (HCC): Status: ACTIVE | Noted: 2018-01-01

## 2018-11-29 PROBLEM — E87.1 HYPONATREMIA: Status: ACTIVE | Noted: 2018-01-01

## 2018-11-29 PROBLEM — E87.6 HYPOKALEMIA: Status: ACTIVE | Noted: 2018-01-01

## 2018-11-29 NOTE — PROGRESS NOTES
· 2 RN skin check complete with Byron Matthews  · Devices in place N/A.  · Skin assessed under devices N/A.  · Confirmed pressure ulcers found on N/A.  · New potential pressure ulcers noted on N/A.  · The following interventions in place pillows for positioning, draw sheet for repositioning.    Bruising noted bilaterally on legs and arms, small red blanchable area under right breast, right heel red, blanchable, lump under right arm

## 2018-11-29 NOTE — ED PROVIDER NOTES
ED Provider Note    Scribed for Daryn Johnson M.D. by Kelly Hall. 11/29/2018, 12:57 AM.    Primary care provider: Lexis Orlando D.O.  Means of arrival: Ambulance  History obtained from: Patient  History limited by: None    CHIEF COMPLAINT  Chief Complaint   Patient presents with   • GLF     Pt has fallen 4 times tonight.    • Dizziness       HPI  Cyndi Schwab is an 84 y.o. female with history of diabetes, neuropathy, and chronic kidney disease who presents to the Emergency Department for evaluation following 4 ground level falls tonight at [facility]. The patient states she hit the back of her head during one of the falls, but did not lose consciousness. She endorses associated dizziness prior to each fall. The patient states she has experienced similar falls in the past, and moved to [facility] due to her significant history of falls. She is concerned now as she developed difficulty bending her elbows and knees one week ago, which has now progressed to numbness and weakness in her bilateral legs.  She additionally reports one episode of vomiting last night, and currently endorses urinary urgency and mild abdominal discomfort. No alleviating or exacerbating factors are identified. Prior to arrival in the ED tonight, the patient was also found to have a fever of 101 °F at her assisted living facility.  The patient was recently started on Lasix for bilateral lower extremity edema.  The patient denies associated chest pain, worsening shortness of breath, headache, or difficulty speaking. She has no prior history of DVT or PE. The patient currently takes Pradaxa due to her history of atrial fibrillation.    REVIEW OF SYSTEMS  Pertinent positives include fall, bilateral leg weakness, bilateral leg numbness, vomiting, urinary urgency, abdominal discomfort, ankle swelling. Pertinent negatives include no chest pain, worsening shortness of breath, headache, or difficulty speaking. As  above, all other systems reviewed and are negative.   See HPI for further details.     PAST MEDICAL HISTORY   has a past medical history of Afib (HCC); Arthritis; Breath shortness; Cancer (HCC) (2006); CATARACT (2005); Diabetes; Heart valve disease; High cholesterol; Hyperlipidemia; Hypertension; Indigestion; Other specified disorder of intestines; Pain; Parkinson's disease; Pneumonia (0ctober of 2015); Psychiatric problem; Renal disorder; Sleep apnea; Snoring; Stroke ([facility]) (1/2016); Unspecified disorder of thyroid; and Unspecified urinary incontinence.    SURGICAL HISTORY   has a past surgical history that includes hysterectomy radical (1972); mastectomy (Left, 2006); cataract phaco with iol (Right, 2005); cataract phaco with iol (6/9/2010); cardiac cath (2011); breast biopsy (5/21/2013); and irrigation & debridement ortho (Right, 11/10/2016).    SOCIAL HISTORY  Social History   Substance Use Topics   • Smoking status: Never Smoker   • Smokeless tobacco: Never Used   • Alcohol use No      History   Drug Use No       FAMILY HISTORY  Family History   Problem Relation Age of Onset   • Stroke Mother    • Hypertension Mother        CURRENT MEDICATIONS  Home Medications    **Home medications have not yet been reviewed for this encounter**         ALLERGIES  Allergies   Allergen Reactions   • Penicillins      Had as a child, unsure of reaction  About 70 years ago       PHYSICAL EXAM  VITAL SIGNS: /53   Pulse 74   Temp 36.6 °C (97.9 °F) (Temporal)   Resp (!) 22   Ht 1.524 m (5')   Wt 70 kg (154 lb 5.2 oz)   SpO2 98%   BMI 30.14 kg/m²   Vitals reviewed.  Constitutional: Alert in no apparent distress.  HENT: No signs of trauma, Bilateral external ears normal, Nose normal. Dry mucous membranes.  Eyes: Pupils are equal and reactive, Conjunctiva normal, Non-icteric.   Neck: Normal range of motion, No tenderness, Supple, No stridor.   Lymphatic: No lymphadenopathy noted.   Cardiovascular: Regular rate and rhythm, no  murmurs.   Thorax & Lungs: Normal breath sounds, No respiratory distress, No wheezing, No chest tenderness.   Abdomen: Bowel sounds normal, Soft with suprapubic tenderness to palpation, No peritoneal signs, No masses, No pulsatile masses.   Skin: Warm, Dry, No erythema, No rash.   Back: Normal alignment.   Extremities: Intact distal pulses, No edema, No tenderness, No cyanosis  Musculoskeletal: Good range of motion in all major joints. No major deformities noted.   Neurologic: Tremulous, Alert, Normal motor function, Normal sensory function. Cranial Nerves II - XII intact. 4+/5 strength in bilateral lower extremities, 5/5 strength in bilateral upper extremities. Normal finger to nose with tremulous upper extremities bilaterally.   Psychiatric: Affect normal, Judgment normal, Mood normal.     DIAGNOSTIC STUDIES / PROCEDURES    LABS  Labs Reviewed   CBC WITH DIFFERENTIAL - Abnormal; Notable for the following:        Result Value    RBC 3.71 (*)     Hemoglobin 11.7 (*)     Hematocrit 33.4 (*)     Platelet Count 161 (*)     Neutrophils-Polys 78.70 (*)     Lymphocytes 12.10 (*)     Lymphs (Absolute) 0.78 (*)     All other components within normal limits    Narrative:     Indicate which anticoagulants the patient is on:->ARGATROBAN  ** Pradaxa   COMP METABOLIC PANEL - Abnormal; Notable for the following:     Sodium 131 (*)     Potassium 3.1 (*)     Chloride 93 (*)     Anion Gap 13.0 (*)     Glucose 192 (*)     Bun 113 (*)     Creatinine 3.97 (*)     All other components within normal limits    Narrative:     Indicate which anticoagulants the patient is on:->ARGATROBAN  ** Pradaxa   PROTHROMBIN TIME - Abnormal; Notable for the following:     PT 20.7 (*)     INR 1.76 (*)     All other components within normal limits    Narrative:     Indicate which anticoagulants the patient is on:->ARGATROBAN  ** Pradaxa   APTT - Abnormal; Notable for the following:     APTT 55.1 (*)     All other components within normal limits     Narrative:     Indicate which anticoagulants the patient is on:->ARGATROBAN  ** Pradaxa   URINALYSIS - Abnormal; Notable for the following:     Leukocyte Esterase Trace (*)     All other components within normal limits   URINE MICROSCOPIC (W/UA) - Abnormal; Notable for the following:     WBC 5-10 (*)     Bacteria Many (*)     All other components within normal limits   ESTIMATED GFR - Abnormal; Notable for the following:     GFR If  13 (*)     GFR If Non  11 (*)     All other components within normal limits    Narrative:     Indicate which anticoagulants the patient is on:->ARGATROBAN  ** Pradaxa   TROPONIN    Narrative:     Indicate which anticoagulants the patient is on:->ARGATROBAN  ** Pradaxa      All labs reviewed by me.    EKG Interpretation:  Interpreted by me    12 Lead EKG interpreted by me to show:  Normal sinus rhythm  Rate 66  Prolonged QT, QTc, and QRS  Right bundle branch block   Intervals: Normal  Normal T waves  Normal ST segments  My impression of this EKG: Does not indicate STEMI.      RADIOLOGY  CT-HEAD W/O    (Results Pending)     The radiologist's interpretation of all radiological studies have been reviewed by me.    COURSE & MEDICAL DECISION MAKING  Nursing notes, VS, PMSFHx reviewed in chart.  Differential diagnoses include but not limited to: Dehydrtion, electrolyte abnormality, UTI, ACS, fluid overload, CVA, TIA, vertigo,      12:57 AM Patient seen and examined at bedside. Patient arrives afebrile with normal vital signs. Patient appears non toxic, but has dry mucous membranes, therefore she will receive 250 mL NS. The physical exam is remarkable for 4/5 strength in bilateral lower extremities.  She is also tremulous which apparently is new for her.  She did recently start Lasix and I suspect that she is quite dehydrated secondary to the addition of this medication.  This may be contributing to the dizziness and falls.  Ordered for CT-Head to rule out  intracranial hemorrhage as the patient struck her head during 1 of the falls and is anticoagulated.  We will also order Urine Microscopic, Estimated GFR, Urinalysis, CBC with differential, CMP, Troponin, PT/INR, PTT, and EKG to evaluate.    CBC without leukocytosis.  There is anemia but not significantly changed from prior.  Patient's sodium is slightly low as is her potassium.  The potassium will be repleted orally.  Patient does have a history of chronic kidney disease but today her BUN and creatinine are significantly elevated beyond baseline.  24 days ago, BUN/creatinine was 41/1.95.  Today, it is 113/3.97.  I suspect there is a significant prerenal component to this.    CT head without acute intracranial abnormality.    3:38 AM - Reviewed patient's lab and radiology results as shown above.  She will require admission for IV fluids for her AK I.    3:43 AM - Paged Hospitalist.    3:47 AM - Consult with Dr. Plunkett, Hospitalist, who agrees to admit the patient.    DISPOSITION:  Patient will be admitted to Dr. Plunkett, Hospitalist, in guarded condition.    FINAL IMPRESSION  1. Dizziness    2. Fall, initial encounter    3. CARLOS (acute kidney injury) (HCC)    4. Hypokalemia          Kelly DANIEL (Angel), am scribing for, and in the presence of, Daryn Johnson M.D..    Electronically signed by: Kelly Hall (Angel), 11/29/2018    Daryn DANIEL M.D. personally performed the services described in this documentation, as scribed by Kelly Hall in my presence, and it is both accurate and complete.    C.    The note accurately reflects work and decisions made by me.  Daryn Johnson  11/29/2018  6:35 AM

## 2018-11-29 NOTE — CARE PLAN
Problem: Communication  Goal: The ability to communicate needs accurately and effectively will improve    Intervention: [place] patient and significant other/support system to call light to alert staff of needs  [place] patient to unit and use of call light.

## 2018-11-29 NOTE — ASSESSMENT & PLAN NOTE
Multiple falls without associated loss of consciousness reported.  Likely due to dehydration in setting of diuretic therapy             Hx of repeated falls   Blurry vision likely due to diabetes  Dizziness maybe due to excess lasix intake  Ct scan of head was negative for intracranial pathology  PT regan recommends home PT

## 2018-11-29 NOTE — H&P
Hospital Medicine History & Physical Note    Date of Service  11/29/2018    Primary Care Physician  Lexis Orlando D.O.    Consultants  None    Code Status  DNAR/DNI    Chief Complaint  Weakness    History of Presenting Illness  84 y.o. female With history of atrial fibrillation on anticoagulation and rate controlled, essential hypertension which is controlled, and hypothyroidism on replacement therapy was in her usual state of health until approximately 2 weeks prior to admission.  At that time, her diuretic therapy was increased out of concern for lower extremity swelling.  She was compliant with this, however she noted weight loss, weakness, chills, and difficulty ambulating.  In the few days prior to admission, she was noted to have several ground-level falls, apparently losing consciousness and during these episodes for several seconds.  She currently reports feeling okay, she feels somewhat better after fluid administration in the emergency department.  She has no other complaints.    Review of Systems  Review of Systems   Constitutional: Positive for malaise/fatigue.   Cardiovascular: Negative for orthopnea.   Skin: Negative.        Past Medical History   has a past medical history of Afib ([facility]); Arthritis; Breath shortness; Cancer ([facility]) (2006); CATARACT (2005); Diabetes; Heart valve disease; High cholesterol; Hyperlipidemia; Hypertension; Indigestion; Other specified disorder of intestines; Pain; Parkinson's disease; Pneumonia (0ctober of 2015); Psychiatric problem; Renal disorder; Sleep apnea; Snoring; Stroke ([facility]) (1/2016); Unspecified disorder of thyroid; and Unspecified urinary incontinence.    Surgical History   has a past surgical history that includes hysterectomy radical (1972); mastectomy (Left, 2006); cataract phaco with iol (Right, 2005); cataract phaco with iol (6/9/2010); cardiac cath (2011); breast biopsy (5/21/2013); and irrigation & debridement ortho (Right, 11/10/2016).     Family  History  family history includes Hypertension in her mother; Stroke in her mother.     Social History   reports that she has never smoked. She has never used smokeless tobacco. She reports that she does not drink alcohol or use drugs.    Allergies  Allergies   Allergen Reactions   • Penicillins      Had as a child, unsure of reaction  About 70 years ago       Medications  Prior to Admission Medications   Prescriptions Last Dose Informant Patient Reported? Taking?   Cholecalciferol (VITAMIN D) 2000 UNITS Cap 11/27/2016 at am Patient Yes No   Sig: Take 2,000 Units by mouth every day.   Dabigatran Etexilate Mesylate (PRADAXA PO)   Yes Yes   Sig: Take 75 mg by mouth.   Multiple Vitamins-Minerals (MULTIVITAL PO) 11/27/2016 at am Patient Yes No   Sig: Take 1 Tab by mouth every day.   Probiotic Product (PROBIOTIC PO) 11/27/2016 at am Patient Yes No   Sig: Take 1 Cap by mouth every day.   Ropinirole HCl 4 MG TABLET SR 24 HR 11/26/2016 at pm Patient Yes No   Sig: Take 4 mg by mouth every bedtime.   allopurinol (ZYLOPRIM) 100 MG Tab 11/26/2016 at pm Patient Yes No   Sig: Take 100 mg by mouth every day.   amlodipine (NORVASC) 5 MG Tab 11/29/2018 at am Patient Yes No   Sig: Take 5 mg by mouth every evening.   atorvastatin (LIPITOR) 40 MG Tab 11/26/2016 at pm Patient Yes No   Sig: Take 40 mg by mouth every evening.   clonidine (CATAPRESS) 0.2 MG Tab 11/29/2018 at pm Patient Yes No   Sig: Take 0.2 mg by mouth 2 times a day. 2 tabs AM and 1 tab PM   ferrous sulfate 325 (65 FE) MG tablet   No No   Sig: Take 1 Tab by mouth every day.   flecainide (TAMBOCOR) 100 MG Tab 11/27/2016 at pm Patient Yes No   Sig: Take 100 mg by mouth 2 times a day.   fluoxetine (PROZAC) 40 MG capsule 11/27/2016 at am Patient Yes No   Sig: Take 40 mg by mouth every day.   furosemide (LASIX) 40 MG Tab 11/27/2016 at am Patient Yes No   Sig: Take 60 mg by mouth every day.   insulin glargine (LANTUS) 100 UNIT/ML Solution 11/26/2016 at pm Patient Yes No   Sig:  Inject 30 Units as instructed every evening.   levothyroxine (SYNTHROID) 100 MCG Tab 11/27/2016 at am Patient Yes No   Sig: Take 100 mcg by mouth Every morning on an empty stomach.   lisinopril (PRINIVIL) 20 MG Tab 11/26/2016 at pm Patient Yes No   Sig: Take 20 mg by mouth every evening.   magnesium oxide (MAG-OX) 400 MG Tab 11/26/2016 at pm Patient Yes No   Sig: Take 400 mg by mouth every day.   omeprazole (PRILOSEC) 20 MG delayed-release capsule 11/27/2016 at am Patient Yes No   Sig: Take 20 mg by mouth 2 times a day.   potassium chloride SA (K-DUR) 10 MEQ TBCR 11/27/2016 at am Patient Yes No   Sig: Take 10 mEq by mouth 2 times a day.      Facility-Administered Medications: None       Physical Exam  Temp:  [36.6 °C (97.9 °F)] 36.6 °C (97.9 °F)  Pulse:  [66-74] 68  Resp:  [12-22] 20  BP: (143)/(53) 143/53    Physical Exam   Constitutional: She is oriented to person, place, and time. She appears well-developed and well-nourished. No distress.   HENT:   Head: Normocephalic and atraumatic.   Eyes: Pupils are equal, round, and reactive to light.   Neck: Normal range of motion. Neck supple. Thyromegaly present.   Cardiovascular: Normal rate, regular rhythm and normal heart sounds.  Exam reveals no gallop and no friction rub.    No murmur heard.  Pulmonary/Chest: Effort normal and breath sounds normal. No respiratory distress. She has no wheezes. She has no rales.   Abdominal: Soft. Bowel sounds are normal. She exhibits no distension. There is no tenderness. There is no rebound.   Musculoskeletal: Normal range of motion. She exhibits no edema.   Neurological: She is alert and oriented to person, place, and time. No cranial nerve deficit.   Skin: Skin is warm and dry. She is not diaphoretic.   Psychiatric: She has a normal mood and affect.   Nursing note and vitals reviewed.      Laboratory:  Recent Labs      11/29/18   0124   WBC  6.4   RBC  3.71*   HEMOGLOBIN  11.7*   HEMATOCRIT  33.4*   MCV  90.0   MCH  31.5   MCHC   35.0   RDW  42.3   PLATELETCT  161*   MPV  10.8     Recent Labs      11/29/18   0124   SODIUM  131*   POTASSIUM  3.1*   CHLORIDE  93*   CO2  25   GLUCOSE  192*   BUN  113*   CREATININE  3.97*   CALCIUM  9.0     Recent Labs      11/29/18   0124   ALTSGPT  15   ASTSGOT  22   ALKPHOSPHAT  92   TBILIRUBIN  0.6   GLUCOSE  192*     Recent Labs      11/29/18 0124   APTT  55.1*   INR  1.76*             Recent Labs      11/29/18   0124   TROPONINI  0.03       Urinalysis:    Recent Labs      11/29/18   0124   SPECGRAVITY  1.008   GLUCOSEUR  Negative   KETONES  Negative   NITRITE  Negative   LEUKESTERAS  Trace*   WBCURINE  5-10*   RBCURINE  0-2   BACTERIA  Many*   EPITHELCELL  Few        Imaging:  CT-HEAD W/O   Final Result         1.  No acute intracranial abnormality is identified, there are nonspecific white matter changes, commonly associated with small vessel ischemic disease.  Associated mild cerebral atrophy is noted.            Assessment/Plan:  I anticipate this patient will require at least two midnights for appropriate medical management, necessitating inpatient admission.    * Collapse   Assessment & Plan    Multiple falls without associated loss of consciousness reported.  Likely due to dehydration in setting of diuretic therapy                                   Atrial fibrillation (HCC)- (present on admission)   Assessment & Plan    Rate controlled, anicoagulated     CARLOS (acute kidney injury) (HCC)   Assessment & Plan    On CKD, likely pre-renal in the setting of diuretic therapy.  Plan to monitor.      Hypothyroidism- (present on admission)   Assessment & Plan    Replacement therapy      Diabetes mellitus (HCC)- (present on admission)   Assessment & Plan    With hyperglycemia.  Monitor on insulin therapy                    HTN (hypertension)- (present on admission)   Assessment & Plan    Controlled with current medication regimen.  Monitor.          VTE prophylaxis: SCD, heparin

## 2018-11-29 NOTE — CONSULTS
DATE OF SERVICE:  11/29/2018    REASON FOR CONSULTATION:  This is nephrology consultation for evaluation and   management of acute kidney injury on chronic kidney disease.    REQUESTING PHYSICIAN:  From [facility].    HISTORY OF PRESENT ILLNESS:  The patient is a pleasant 84-year-old female with   past medical history of chronic kidney disease who follows Dr. Garza in the   outpatient setting for her kidney disease, presents to the hospital with   generalized weakness.  In the emergency room, she was found to have a BUN and   creatinine of 113/3.97 mg/dL, with a potassium of 3.1.  On 11/21, her serum   creatinine was 3.15 with a BUN of 99.  Prior to that, it looks like her   baseline has been ranging from 1.7-2.1 mg/dL.  The patient states that she has   been on diuretics and was told to stop yesterday.  Her last dose of   furosemide and metolazone was yesterday.  She denies nonsteroidal   anti-inflammatory drugs.  States her blood pressures have been okay at home.    There are no complaints of diarrhea.  She is more constipated.  There is no   history of nausea or vomiting, except once this past week.  No trouble   urinating.  She denies having any contrast procedures recently.    REVIEW OF SYSTEMS  PULMONARY:  No complaints of dyspnea, cough or wheeze.  CARDIOVASCULAR:  No chest pain, pedal edema, orthopnea.  SKIN:  With no evidence of rash, pruritus, or wounds.  GASTROINTESTINAL:  No nausea, vomiting, diarrhea or constipation.  NEUROLOGIC:  No dizziness or syncope.  She does have some generalized   weakness.    PAST MEDICAL HISTORY:  Significant for:  1.  Chronic kidney disease stage III.  2.  Hypertension.  3.  Hyperlipidemia.  4.  Atrial fibrillation.  5.  Arthritis.  6.  Diabetes.  7.  Valvular heart disease.  8.  History of Parkinson's disease.  9.  Cerebrovascular accident.  10.  Sleep apnea.    PAST SURGICAL HISTORY:  Hysterectomy, mastectomy, cataract surgery, I and D,   breast biopsy,  and cardiac catheterization.    FAMILY HISTORY:  Negative for kidney disease.    SOCIAL HISTORY:  She has never smoked, does not drink alcohol or use IV drugs.    ALLERGIES:  TO PENICILLIN.    MEDICATIONS:  Her outpatient medications include vitamin D, Pradaxa,   probiotic, ropinirole, Zyloprim, Norvasc, Lipitor, Catapres, Prozac, Lasix,   Tambocor, Synthroid, Lantus insulin, Prinivil, Mag-Ox, Prilosec.    PHYSICAL EXAMINATION:  VITAL SIGNS:  On examination, vitals revealing a blood pressure of 128/51 with   a pulse of 79, temperature 36.6.  GENERAL:  She is lying in bed, in no major hemodynamic distress.  HEENT:  Normocephalic, atraumatic.  NECK:  Supple, no JVD, no thyromegaly.  CHEST:  Clear bilaterally.  CARDIOVASCULAR:  S1, S2 heard.  ABDOMEN:  Soft, nontender, nondistended.  Bowel sounds are present.  EXTREMITIES:  Without any evidence of cyanosis or clubbing.  SKIN:  With no evidence of rash, pruritus, or wounds.  NEUROLOGIC:  Nonfocal.    LABORATORY DATA:  Urinalysis did not show any evidence of protein or blood.    There was trace leukocyte esterase.  Her white count was 6.4, hemoglobin of   11.7, platelet count of 161.  Sodium 131, potassium 3.1, chloride 93,   bicarbonate 25, BUN and creatinine of 113/3.97, calcium of 9, glucose of 192,   albumin of 4.    ASSESSMENT AND PLAN:  1.  Acute kidney injury, likely secondary to overall volume depletion from   over diuresis.  2.  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  There is no   protein on urinalysis.  It is unclear whether she has diabetic nephropathy.  3.  Hypothyroidism.  4.  Hyperlipidemia.  5.  Atrial fibrillation.  6.  Valvular heart disease.  7.  History of cerebrovascular accident.  8.  Anemia, likely chronic kidney disease.  9.  Hyponatremia.  10.  Hypokalemia.    PLAN:  There is no urgent need for dialysis at this time.  I would continue   her on normal saline  at the current rate.  Avoid nephrotoxins.  Renal dose   medications as necessary.  We will also get a kidney ultrasound to assess her   kidneys while she is here.    Thank you for allowing us to care for the patient.  We will follow her closely   with you.       ____________________________________     MD RUBY VEGA / LINDSAY    DD:  11/29/2018 12:52:03  DT:  11/29/2018 15:58:15    D#:  [id number]  Job#:  [id number]

## 2018-11-29 NOTE — ASSESSMENT & PLAN NOTE
On CKD, likely pre-renal in the setting of diuretic therapy.   Signs of orthostasis, dry mucus membranes improving  Creatinine improved since admission  NS dced  Outpatient nephrology is

## 2018-11-29 NOTE — PROGRESS NOTES
Patient A&O x 4, up with one and wheeled walker, also bedpan at this time. VS stable. Slight headache pain noted on assessment. Patient resting comfortably at this time, will continue to monitor.

## 2018-11-29 NOTE — ED TRIAGE NOTES
Chief Complaint   Patient presents with   • GLF     Pt has fallen 4 times tonight.    • Dizziness     /53   Pulse 74   Temp 36.6 °C (97.9 °F) (Temporal)   Resp (!) 22   Ht 1.524 m (5')   Wt 70 kg (154 lb 5.2 oz)   SpO2 98%   BMI 30.14 kg/m²     BIB EMS from assisted living faculty. PT states she fell 4 times today and hit her head. -LOC, A&Ox4. Pt is pn pradaxa for blood thinners. States she felt dizzy before falling. Pt has lump on back of head from fall.     States she has been shaking more since fall.

## 2018-11-30 PROBLEM — E87.29 HIGH ANION GAP METABOLIC ACIDOSIS: Status: ACTIVE | Noted: 2018-01-01

## 2018-11-30 NOTE — THERAPY
"Physical Therapy Evaluation completed.   Bed Mobility:  Supine to Sit: Stand by Assist (increased time, use of railing)  Transfers: Sit to Stand: Contact Guard Assist (with FWW x 4 reps)  Gait: Level Of Assist: Contact Guard Assist with Front-Wheel Walker   50ft x 2    Plan of Care: Will benefit from Physical Therapy 3 times per week  Discharge Recommendations: Equipment: No Equipment Needed.    See \"Rehab Therapy-Acute\" Patient Summary Report for complete documentation.     Pt presents with impaired activity tolerance and dynamic balance associate with acute on chronic deconditioning in setting of increased diuretic and now CARLOS. Pt has several risk factors for exercise induced hypotension as well as positional hypotension (PD, DM, hypothyroidism) that were probably exacerbated with additional diuretic; pt is HTN at rest with a 30 point systolic drop in standing with associated dizziness; pt may be particularly sensitive to relatively low BP's as she states she has had HTN issues for decades; from a physical therapy perspective, pt is very strong for her age but fall risk is associated with hemodynamics; discussed high risk situations of static standing as well as peripheral endurance program for hopeful improved vascular compliance. Pt will most likely remain a fall risk unless HDR can be further optimized; pt wishes to return home when medically appropriate to do so; she has 24/7 care available, would recommend home health PT to ensure educational carryover. Will follow.       HDR: seated: 170/59, standing immediately 138/43; seated post 50ft walk 172/55 (could not maintain standing post amb for more accurate response)   "

## 2018-11-30 NOTE — PROGRESS NOTES
Hannah from Lab called regarding aPTT, new result is 106.5. According to protocol, a small dose rate change of 100 units/hr is required. Charge notified.

## 2018-11-30 NOTE — CARE PLAN
Problem: Communication  Goal: The ability to communicate needs accurately and effectively will improve  Outcome: PROGRESSING AS EXPECTED    Intervention: Educate patient and significant other/support system about the plan of care, procedures, treatments, medications and allow for questions  Discussed plan of care with pt, she verbalizes understanding      Problem: Venous Thromboembolism (VTW)/Deep Vein Thrombosis (DVT) Prevention:  Goal: Patient will participate in Venous Thrombosis (VTE)/Deep Vein Thrombosis (DVT)Prevention Measures  Outcome: PROGRESSING AS EXPECTED  Follow Heparin drip protocol, schedule APTT draws on time. Assess pt for bleeding.

## 2018-11-30 NOTE — PROGRESS NOTES
"New aPTT was collected 11-30-18 at 0013. No results as of yet. This RN called lab and was told that \"it looks like the phlebotomist still has it.\" She was going to track down sample and call this RN back. Charge notified.   "

## 2018-11-30 NOTE — PROGRESS NOTES
[facility] Hospitalist Progress Note    Date of Service: 11/30/2018    Chief Complaint  84 y.o. female admitted 11/29/2018 with history of atrial fibrillation on anticoagulation and rate controlled, essential hypertension which is controlled, and hypothyroidism on replacement therapy was in her usual state of health until approximately 2 weeks prior to admission.  At that time, her diuretic therapy was increased out of concern for lower extremity swelling.  She was compliant with this, however she noted weight loss, weakness, chills, and difficulty ambulating.  In the few days prior to admission, she was noted to have several ground-level falls, apparently losing consciousness and during these episodes for several seconds    Since being admitted to the hospital she has been on IV fluids.  Her symptoms have improved and her kidney functions have improved.  She will work with PT and OT.  Patient still continues by dizziness.  She is tolerating her diet well.    Interval Problem Update  PT OT evaluation  Improvement in kidney function    Consultants     Nephrology    Disposition  PT evaluation        Review of Systems   Constitutional: Negative for chills, fever, malaise/fatigue and weight loss.   HENT: Negative for ear discharge, ear pain, hearing loss, sinus pain, sore throat and tinnitus.    Eyes: Negative for blurred vision, double vision, photophobia, pain, discharge and redness.   Respiratory: Negative for cough, hemoptysis, sputum production, shortness of breath, wheezing and stridor.    Cardiovascular: Negative for chest pain, palpitations, orthopnea, claudication, leg swelling and PND.   Gastrointestinal: Negative for abdominal pain, blood in stool, constipation, diarrhea, heartburn, melena, nausea and vomiting.   Genitourinary: Negative for dysuria, flank pain, frequency, hematuria and urgency.   Musculoskeletal: Positive for falls. Negative for back pain, joint pain, myalgias and neck pain.   Skin:  Negative for itching and rash.   Neurological: Positive for dizziness, tingling, tremors and headaches. Negative for sensory change, speech change, focal weakness, seizures and loss of consciousness.   Endo/Heme/Allergies: Negative for polydipsia. Does not bruise/bleed easily.   Psychiatric/Behavioral: Negative for depression, hallucinations and suicidal ideas. The patient is not nervous/anxious.       Physical Exam  Laboratory/Imaging   Hemodynamics  Temp (24hrs), Av.6 °C (97.8 °F), Min:36.3 °C (97.3 °F), Max:37.1 °C (98.8 °F)   Temperature: 37.1 °C (98.8 °F)  Pulse  Av.3  Min: 66  Max: 79    Blood Pressure : 144/55      Respiratory      Respiration: 20, Pulse Oximetry: 92 %        RUL Breath Sounds: Clear, RML Breath Sounds: Clear, RLL Breath Sounds: Diminished, DIDIER Breath Sounds: Clear, LLL Breath Sounds: Diminished    Fluids    Intake/Output Summary (Last 24 hours) at 18 1401  Last data filed at 18 1031   Gross per 24 hour   Intake             1960 ml   Output                0 ml   Net             1960 ml       Nutrition  Orders Placed This Encounter   Procedures   • Diet Order Consistent Carbohydrate     Standing Status:   Standing     Number of Occurrences:   1     Order Specific Question:   Diet:     Answer:   Consistent Carbohydrate [4]     Physical Exam   Constitutional: She is oriented to person, place, and time. She appears well-developed and well-nourished.   HENT:   Head: Normocephalic and atraumatic.   Right Ear: External ear normal.   Eyes: Pupils are equal, round, and reactive to light. Conjunctivae and EOM are normal. Right eye exhibits no discharge. Left eye exhibits no discharge. No scleral icterus.   Neck: Normal range of motion. Neck supple. No JVD present. No tracheal deviation present. No thyromegaly present.   Cardiovascular: Normal rate and regular rhythm.  Exam reveals no gallop and no friction rub.    No murmur heard.  Pulmonary/Chest: No respiratory distress. She has  no wheezes. She has no rales. She exhibits no tenderness.   Abdominal: She exhibits no distension and no mass. There is no tenderness. There is no rebound and no guarding.   Musculoskeletal: Normal range of motion.   Lymphadenopathy:     She has no cervical adenopathy.   Neurological: She is alert and oriented to person, place, and time. She has normal strength and normal reflexes. She displays normal reflexes. A cranial nerve deficit and sensory deficit is present.   Positive for  Tremors at rest   Skin: No rash noted. No erythema.   Psychiatric: Her behavior is normal.       Recent Labs      11/29/18   0124  11/30/18   0013   WBC  6.4  7.0   RBC  3.71*  3.86*   HEMOGLOBIN  11.7*  12.3   HEMATOCRIT  33.4*  35.2*   MCV  90.0  91.2   MCH  31.5  31.9   MCHC  35.0  34.9   RDW  42.3  42.8   PLATELETCT  161*  155*   MPV  10.8  10.6     Recent Labs      11/29/18   0124  11/30/18   0013   SODIUM  131*  139   POTASSIUM  3.1*  3.8   CHLORIDE  93*  102   CO2  25  25   GLUCOSE  192*  175*   BUN  113*  85*   CREATININE  3.97*  2.79*   CALCIUM  9.0  9.2     Recent Labs      11/29/18   0124  11/29/18   1715  11/30/18   0013  11/30/18   0613  11/30/18   1153   APTT  55.1*  75.5*  106.5*  84.1*  101.2*   INR  1.76*  1.66*   --    --    --                   Assessment/Plan     * Collapse   Assessment & Plan    Multiple falls without associated loss of consciousness reported.  Likely due to dehydration in setting of diuretic therapy             Hx of repeated falls   Blurry vision likely due to diabetes  Dizziness maybe due to excess lasix intake  Ct scan of head was negative for intracranial pathology  Awaiting PT eval                          CARLOS (acute kidney injury) (HCC)   Assessment & Plan    On CKD, likely pre-renal in the setting of diuretic therapy.   Signs of orthostasis, dry mucus membranes  Elevated creatinine will give her a fluid challenge   Will start her on NS 100ml/hr  Outpatient nephrology is      Atrial  fibrillation (HCC)- (present on admission)   Assessment & Plan    Rate controlled, anicoagulated  Rhythm controlled with flecainide    Anticoagulation with heparin due to poor renal function and will not tolerate oral anticoagulation  Monitoring APTT     Hypothyroidism- (present on admission)   Assessment & Plan    Replacement therapy      Diabetes mellitus (HCC)- (present on admission)   Assessment & Plan    With hyperglycemia.  Monitor on insulin therapy   Hba1c 8             HTN (hypertension)- (present on admission)   Assessment & Plan    Controlled with current medication regimen.  Monitor.      Parkinson disease (HCC)- (present on admission)   Assessment & Plan    On requiq 4mg         Quality-Core Measures   Reviewed items::  EKG reviewed, Radiology images reviewed, Labs reviewed and Medications reviewed  Alvarado catheter::  No Alvarado  DVT prophylaxis pharmacological::  Heparin  Ulcer Prophylaxis::  Not indicated  Assessed for rehabilitation services:  Patient was assess for and/or received rehabilitation services during this hospitalization

## 2018-11-30 NOTE — PROGRESS NOTES
Hannah from lab called with a critical lab value for aPTT of 114.3 from 75.5. Requested lab redraw aPTT to make sure specimen was not contaminated. Notified charge.

## 2018-11-30 NOTE — PROGRESS NOTES
Patient A/Ox4, pt unsteady ambulating, hx of falls, prefers to use bedpan for night. Urinary frequency. PIV in rt AC became dislodged, continuous NS paused until new site can be established. PIV in left forearm patent and running Heparin drip, next APTT lab draw scheduled for 2315. Evening , 30 units of Lantus given with no additional coverage needed. No complaints of pain except for when she coughs which is intermittent. Call light within reach and used appropriately. Bed in low and locked position. Bed alarm active and audible. No signs of distress noted at this time.

## 2018-11-30 NOTE — PROGRESS NOTES
Rate change completed and verified with charge RN, new rate is currently 800 units/hr, 16 mL/hr. Next aPTT lab draw ordered for 0800.

## 2018-11-30 NOTE — PROGRESS NOTES
[facility] Nephrology Daily Progress Note    Date of Service  11/30/2018    Chief Complaint  The patient is a pleasant 84-year-old female with   past medical history of chronic kidney disease who follows Dr. Garza in the   outpatient setting for her kidney disease, presents to the hospital with   generalized weakness.  In the emergency room, she was found to have a BUN and   creatinine of 113/3.97 mg/dL, with a potassium of 3.1.  On 11/21, her serum   creatinine was 3.15 with a BUN of 99.  Prior to that, it looks like her   baseline has been ranging from 1.7-2.1 mg/dL.  The patient states that she has   been on diuretics and was told to stop yesterday.  Her last dose of   furosemide and metolazone was yesterday.  She denies nonsteroidal   anti-inflammatory drugs.  States her blood pressures have been okay at home.    There are no complaints of diarrhea.  She is more constipated.  There is no   history of nausea or vomiting, except once this past week.  No trouble   urinating.  She denies having any contrast procedures recently.      Interval Problem Update  Daily Nephrology Summary:  11/30/18 - Renal function is improving. Good UOP. KRIS: Bilateral renal cortical thinning without hydronephrosis.    Review of Systems  Review of Systems   All other systems reviewed and are negative.       Physical Exam  Temp:  [36.3 °C (97.3 °F)-37.1 °C (98.8 °F)] 37.1 °C (98.8 °F)  Pulse:  [67-72] 72  Resp:  [16-20] 20  BP: (132-152)/(47-55) 144/55    Physical Exam   Constitutional: She is oriented to person, place, and time. She appears well-developed and well-nourished.   HENT:   Head: Normocephalic and atraumatic.   Eyes: Pupils are equal, round, and reactive to light. EOM are normal.   Neck: Normal range of motion. Neck supple.   Cardiovascular: Normal rate and regular rhythm.    Pulmonary/Chest: Effort normal and breath sounds normal.   Abdominal: Soft. Bowel sounds are normal.   Musculoskeletal: Normal range of motion.    Neurological: She is alert and oriented to person, place, and time.   Skin: Skin is warm and dry.   Nursing note and vitals reviewed.      Fluids    Intake/Output Summary (Last 24 hours) at 11/30/18 1216  Last data filed at 11/30/18 1031   Gross per 24 hour   Intake             1960 ml   Output                0 ml   Net             1960 ml       Laboratory  Recent Labs      11/29/18   0124  11/30/18   0013   WBC  6.4  7.0   RBC  3.71*  3.86*   HEMOGLOBIN  11.7*  12.3   HEMATOCRIT  33.4*  35.2*   MCV  90.0  91.2   MCH  31.5  31.9   MCHC  35.0  34.9   RDW  42.3  42.8   PLATELETCT  161*  155*   MPV  10.8  10.6     Recent Labs      11/29/18   0124  11/30/18   0013   SODIUM  131*  139   POTASSIUM  3.1*  3.8   CHLORIDE  93*  102   CO2  25  25   GLUCOSE  192*  175*   BUN  113*  85*   CREATININE  3.97*  2.79*   CALCIUM  9.0  9.2     Recent Labs      11/29/18   0124  11/29/18   1715  11/30/18   0013  11/30/18   0613   APTT  55.1*  75.5*  106.5*  84.1*   INR  1.76*  1.66*   --    --                Imaging       Assessment/Plan  No new Assessment & Plan notes have been filed under this hospital service since the last note was generated.  Service: Nephrology     ASSESSMENT AND PLAN:  1.  Acute kidney injury, likely secondary to overall volume depletion from over diuresis. Improving with hydration.  2.  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 There is no   protein on urinalysis.  It is unclear whether she has diabetic nephropathy.  3.  Hypothyroidism.  4.  Hyperlipidemia.  5.  Atrial fibrillation.  6.  Valvular heart disease.  7.  History of cerebrovascular accident.  8.  Anemia, likely chronic kidney disease.  9.  Hyponatremia.  10.  Hypokalemia.    Plan:  CPM  Can f/u with Dr. Garza her nephrologist once discharged from hospital

## 2018-12-01 PROBLEM — E87.1 HYPONATREMIA: Status: RESOLVED | Noted: 2018-01-01 | Resolved: 2018-01-01

## 2018-12-01 PROBLEM — E87.29 HIGH ANION GAP METABOLIC ACIDOSIS: Status: RESOLVED | Noted: 2018-01-01 | Resolved: 2018-01-01

## 2018-12-01 PROBLEM — N17.9 AKI (ACUTE KIDNEY INJURY) (HCC): Status: RESOLVED | Noted: 2018-01-01 | Resolved: 2018-01-01

## 2018-12-01 PROBLEM — R55 COLLAPSE: Status: RESOLVED | Noted: 2018-01-01 | Resolved: 2018-01-01

## 2018-12-01 PROBLEM — E87.6 HYPOKALEMIA: Status: RESOLVED | Noted: 2018-01-01 | Resolved: 2018-01-01

## 2018-12-01 NOTE — PROGRESS NOTES
[facility] Hospitalist Progress Note    Date of Service: 12/1/2018    Chief Complaint  84 y.o. female admitted 11/29/2018 with history of atrial fibrillation on anticoagulation and rate controlled, essential hypertension which is controlled, and hypothyroidism on replacement therapy was in her usual state of health until approximately 2 weeks prior to admission.  At that time, her diuretic therapy was increased out of concern for lower extremity swelling.  She was compliant with this, however she noted weight loss, weakness, chills, and difficulty ambulating.  In the few days prior to admission, she was noted to have several ground-level falls, apparently losing consciousness and during these episodes for several seconds    Since being admitted to the hospital she has been on IV fluids.  Her symptoms have improved and her kidney functions have improved.  She will work with PT and OT.  Patient still continues by dizziness.  She is tolerating her diet well.    Interval Problem Update  12/1: PT OT evaluation determined that she will need home PT, will work on placement.  Said she is markedly improved today and does not complain about dizziness.  He is tolerating her diet well.  Sodium and kidney function has improved.  We will restart home Pradaxa.    Consultants     Nephrology    Disposition  Home with home PT        Review of Systems   Constitutional: Negative for chills, fever, malaise/fatigue and weight loss.   HENT: Negative for ear discharge, ear pain, hearing loss, sinus pain, sore throat and tinnitus.    Eyes: Negative for blurred vision, double vision, photophobia, pain, discharge and redness.   Respiratory: Negative for cough, hemoptysis, sputum production, shortness of breath, wheezing and stridor.    Cardiovascular: Negative for chest pain, palpitations, orthopnea, claudication, leg swelling and PND.   Gastrointestinal: Negative for abdominal pain, blood in stool, constipation, diarrhea, heartburn,  melena, nausea and vomiting.   Genitourinary: Negative for dysuria, flank pain, frequency, hematuria and urgency.   Musculoskeletal: Positive for falls. Negative for back pain, joint pain, myalgias and neck pain.   Skin: Negative for itching and rash.   Neurological: Positive for dizziness, tingling, tremors and headaches. Negative for sensory change, speech change, focal weakness, seizures and loss of consciousness.   Endo/Heme/Allergies: Negative for polydipsia. Does not bruise/bleed easily.   Psychiatric/Behavioral: Negative for depression, hallucinations and suicidal ideas. The patient is not nervous/anxious.       Physical Exam  Laboratory/Imaging   Hemodynamics  Temp (24hrs), Av.1 °C (98.7 °F), Min:36.3 °C (97.3 °F), Max:37.5 °C (99.5 °F)   Temperature: 36.3 °C (97.3 °F)  Pulse  Av.7  Min: 66  Max: 86    Blood Pressure : 153/56      Respiratory      Respiration: 18, Pulse Oximetry: 94 %        RUL Breath Sounds: Clear, RML Breath Sounds: Clear, RLL Breath Sounds: Diminished, DIDIER Breath Sounds: Clear, LLL Breath Sounds: Diminished    Fluids    Intake/Output Summary (Last 24 hours) at 18 1454  Last data filed at 18 1428   Gross per 24 hour   Intake              600 ml   Output                0 ml   Net              600 ml       Nutrition  Orders Placed This Encounter   Procedures   • Diet Order Consistent Carbohydrate     Standing Status:   Standing     Number of Occurrences:   1     Order Specific Question:   Diet:     Answer:   Consistent Carbohydrate [4]     Physical Exam   Constitutional: She is oriented to person, place, and time. She appears well-developed and well-nourished.   HENT:   Head: Normocephalic and atraumatic.   Right Ear: External ear normal.   Eyes: Pupils are equal, round, and reactive to light. Conjunctivae and EOM are normal. Right eye exhibits no discharge. Left eye exhibits no discharge. No scleral icterus.   Neck: Normal range of motion. Neck supple. No JVD present.  No tracheal deviation present. No thyromegaly present.   Cardiovascular: Normal rate and regular rhythm.  Exam reveals no gallop and no friction rub.    No murmur heard.  Pulmonary/Chest: No respiratory distress. She has no wheezes. She has no rales. She exhibits no tenderness.   Abdominal: She exhibits no distension and no mass. There is no tenderness. There is no rebound and no guarding.   Musculoskeletal: Normal range of motion.   Lymphadenopathy:     She has no cervical adenopathy.   Neurological: She is alert and oriented to person, place, and time. She has normal strength and normal reflexes. A sensory deficit is present. No cranial nerve deficit.   Positive for  Tremors at rest   Skin: No rash noted. No erythema.   Psychiatric: Her behavior is normal.       Recent Labs      11/29/18   0124  11/30/18   0013   WBC  6.4  7.0   RBC  3.71*  3.86*   HEMOGLOBIN  11.7*  12.3   HEMATOCRIT  33.4*  35.2*   MCV  90.0  91.2   MCH  31.5  31.9   MCHC  35.0  34.9   RDW  42.3  42.8   PLATELETCT  161*  155*   MPV  10.8  10.6     Recent Labs      11/29/18   0124  11/30/18   0013   SODIUM  131*  139   POTASSIUM  3.1*  3.8   CHLORIDE  93*  102   CO2  25  25   GLUCOSE  192*  175*   BUN  113*  85*   CREATININE  3.97*  2.79*   CALCIUM  9.0  9.2     Recent Labs      11/29/18   0124  11/29/18   1715   11/30/18   1153  11/30/18   1944  12/01/18   0150   APTT  55.1*  75.5*   < >  101.2*  83.0*  79.5*   INR  1.76*  1.66*   --    --    --    --     < > = values in this interval not displayed.                  Assessment/Plan     Atrial fibrillation (HCC)- (present on admission)   Assessment & Plan    Rate controlled, anicoagulated  Rhythm controlled with flecainide    Will restart home Pradaxa     Hypothyroidism- (present on admission)   Assessment & Plan    Replacement therapy      Diabetes mellitus (HCC)- (present on admission)   Assessment & Plan    With hyperglycemia.  Monitor on insulin therapy   Hba1c 8             HTN  (hypertension)- (present on admission)   Assessment & Plan    Controlled with current medication regimen.  Monitor.      Parkinson disease (HCC)- (present on admission)   Assessment & Plan    On requiq 4mg         Quality-Core Measures   Reviewed items::  EKG reviewed, Radiology images reviewed, Labs reviewed and Medications reviewed  Alvarado catheter::  No Alvarado  DVT prophylaxis pharmacological::  Heparin  Ulcer Prophylaxis::  Not indicated  Assessed for rehabilitation services:  Patient was assess for and/or received rehabilitation services during this hospitalization

## 2018-12-01 NOTE — PROGRESS NOTES
Pt A&O x4, on room air. Assessment performed. Pt denies any pain at the moment. Heparin drip in place, running at 83 ml/hr, therapeutic range. APTT lab result schedule for tomorrow 12/2 at 0150. Pt complaint of nauseas. RN educated to see if pt had any PRN med for nausea. Pt decline, and RN gave ginger ale and emesis bag. Bed alarm in place. Bed locked and lowest position, treaded socks in place and call light within reach.

## 2018-12-01 NOTE — PROGRESS NOTES
[facility] Nephrology Daily Progress Note    Date of Service  12/1/2018    Chief Complaint  The patient is a pleasant 84-year-old female with   past medical history of chronic kidney disease who follows Dr. Garza in the   outpatient setting for her kidney disease, presents to the hospital with   generalized weakness.  In the emergency room, she was found to have a BUN and   creatinine of 113/3.97 mg/dL, with a potassium of 3.1.  On 11/21, her serum   creatinine was 3.15 with a BUN of 99.  Prior to that, it looks like her   baseline has been ranging from 1.7-2.1 mg/dL.  The patient states that she has   been on diuretics and was told to stop yesterday.  Her last dose of   furosemide and metolazone was yesterday.  She denies nonsteroidal   anti-inflammatory drugs.  States her blood pressures have been okay at home.    There are no complaints of diarrhea.  She is more constipated.  There is no   history of nausea or vomiting, except once this past week.  No trouble   urinating.  She denies having any contrast procedures recently.      Interval Problem Update  Daily Nephrology Summary:  11/30/18 - Renal function is improving. Good UOP. KRIS: Bilateral renal cortical thinning without hydronephrosis.  12/1/18 - no new labs, UOP not recorded, feels well, some nausea this AM now resolved    Review of Systems  Review of Systems   Constitutional: Negative for chills and fever.   Respiratory: Negative for shortness of breath and wheezing.    Cardiovascular: Negative for chest pain, palpitations and leg swelling.   Gastrointestinal: Negative for abdominal pain, nausea and vomiting.   Genitourinary: Negative for dysuria, frequency and urgency.   Skin: Negative for itching and rash.   Neurological: Negative for seizures and headaches.        Physical Exam  Temp:  [36.3 °C (97.3 °F)-37.5 °C (99.5 °F)] 36.3 °C (97.3 °F)  Pulse:  [81-86] 83  Resp:  [18] 18  BP: (152-155)/(46-88) 153/56    Physical Exam   Constitutional: She is  oriented to person, place, and time. She appears well-developed and well-nourished.   HENT:   Head: Normocephalic and atraumatic.   Eyes: EOM are normal. Right eye exhibits no discharge. Left eye exhibits no discharge. No scleral icterus.   Neck: Normal range of motion. Neck supple. No tracheal deviation present.   Cardiovascular: Normal rate and regular rhythm.    Pulmonary/Chest: Effort normal and breath sounds normal.   Abdominal: Soft. Bowel sounds are normal.   Musculoskeletal: Normal range of motion. She exhibits no edema.   Neurological: She is alert and oriented to person, place, and time.   Skin: Skin is warm and dry.   Psychiatric: She has a normal mood and affect. Her behavior is normal. Judgment and thought content normal.   Nursing note and vitals reviewed.      Fluids    Intake/Output Summary (Last 24 hours) at 12/01/18 1049  Last data filed at 11/30/18 1638   Gross per 24 hour   Intake              240 ml   Output                0 ml   Net              240 ml       Laboratory  Recent Labs      11/29/18   0124  11/30/18   0013   WBC  6.4  7.0   RBC  3.71*  3.86*   HEMOGLOBIN  11.7*  12.3   HEMATOCRIT  33.4*  35.2*   MCV  90.0  91.2   MCH  31.5  31.9   MCHC  35.0  34.9   RDW  42.3  42.8   PLATELETCT  161*  155*   MPV  10.8  10.6     Recent Labs      11/29/18   0124  11/30/18   0013   SODIUM  131*  139   POTASSIUM  3.1*  3.8   CHLORIDE  93*  102   CO2  25  25   GLUCOSE  192*  175*   BUN  113*  85*   CREATININE  3.97*  2.79*   CALCIUM  9.0  9.2     Recent Labs      11/29/18   0124  11/29/18   1715   11/30/18   1153  11/30/18   1944  12/01/18   0150   APTT  55.1*  75.5*   < >  101.2*  83.0*  79.5*   INR  1.76*  1.66*   --    --    --    --     < > = values in this interval not displayed.               Imaging       Assessment/Plan  No new Assessment & Plan notes have been filed under this hospital service since the last note was generated.  Service: Nephrology     ASSESSMENT AND PLAN:  1.  Acute kidney  injury, likely secondary to overall volume depletion from over diuresis. Improved with hydration.  2.  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 There is no protein on urinalysis.  It is unclear whether she has diabetic nephropathy.  3.  Hypothyroidism.  4.  Hyperlipidemia.  5.  Atrial fibrillation.  6.  Valvular heart disease.  7.  History of cerebrovascular accident.  8.  Anemia, likely chronic kidney disease. At goal  9.  Hyponatremia. resolved  10.  Hypokalemia. resolved    Plan:  Continue to hold diuretics, no need for maintenance IVF at this time  Increase amlodipine to 10mg qhs  Heparin per primary service  Check labs in AM  Can f/u with Dr. Garza her nephrologist once discharged from hospital    Other management per hospitalist service

## 2018-12-01 NOTE — DISCHARGE PLANNING
RN CM left message with service at [facility] to call back with result of referral.

## 2018-12-01 NOTE — DISCHARGE PLANNING
RN CM notified spouse of home health situation with insurance authorization, that pt will be staying here until auth is obtained. Spouse will notify pt's daughter.

## 2018-12-01 NOTE — CARE PLAN
Problem: Safety  Goal: Will remain free from falls  Outcome: PROGRESSING AS EXPECTED  Pt calls appropriately if needs to ambulate.

## 2018-12-01 NOTE — DISCHARGE PLANNING
Thank you for the referral. We also must verify PCP acceptance of orders for the patient on 12/3/18. Thank you for your understanding.

## 2018-12-01 NOTE — DISCHARGE PLANNING
Thank you for the referral. The patient's insurance is under review and will not be fully confirmed until 12/3/18, as [facility] is closed on the weekend. Thank you for your understanding.

## 2018-12-01 NOTE — PROGRESS NOTES
Patient aPTT 79.5  No rate change required per protocol. Verified and co-signed with Charge RN.  Next aPTT is scheduled for 12-2-18 @  1568.

## 2018-12-01 NOTE — DISCHARGE PLANNING
Agency/Facility Name: [facility]  Spoke To: Bhavana  Outcome: Patient has been pended as insurance carrier is closed for weekend.

## 2018-12-01 NOTE — FACE TO FACE
Face to Face Supporting Documentation - Home Health    The encounter with this patient was in whole or in part the primary reason for home health admission.    Date of encounter:   Patient:                    MRN:                       YOB: 2018  Cyndi Schawb  [id number]  10/12/1934     Home health to see patient for:  Home health aide and Physical Therapy evaluation and treatment    Skilled need for:  Comment: Gait imbalance    Skilled nursing interventions to include:  Comment: Physical therapy    Homebound status evidenced by:  Need the aid of supportive devices such as crutches, canes, wheelchairs or walkers. Leaving home requires a considerable and taxing effort. There is a normal inability to leave the home.    Community Physician to provide follow up care: Lexis Orlando D.O.     Optional Interventions? No      I certify the face to face encounter for this home health care referral meets the CMS requirements and the encounter/clinical assessment with the patient was, in whole, or in part, for the medical condition(s) listed above, which is the primary reason for home health care. Based on my clinical findings: the service(s) are medically necessary, support the need for home health care, and the homebound criteria are met.  I certify that this patient has had a face to face encounter by myself.  Radha Pierce M.D. - NPI: [id number]

## 2018-12-01 NOTE — CARE PLAN
Problem: Urinary Elimination:  Goal: Ability to reestablish a normal urinary elimination pattern will improve  Outcome: PROGRESSING AS EXPECTED  Pt experiencing frequency but is up with one assist to commode.

## 2018-12-01 NOTE — PROGRESS NOTES
Patient A/Ox4, up with one person assist to BSC. Evening BG was 270, 30 units of Lantus given plus 3 units of regular insulin. New bag of Heparin hung at 2048, no rate change at that time, range was therapeutic. Awaiting new aPTT lab results. Patient has no complaints of pain, SOB, n/v/d. No PRN given. Bed in low and locked position, bed alarm active and audible. Call light within reach and used appropriately. No signs of distress noted.

## 2018-12-01 NOTE — DISCHARGE PLANNING
Received Choice form at 2125  Agency/Facility Name: [facility]  Referral sent per Choice form @ [id number]

## 2018-12-01 NOTE — DISCHARGE SUMMARY
Discharge Summary    CHIEF COMPLAINT ON ADMISSION  Chief Complaint   Patient presents with   • GLF     Pt has fallen 4 times tonight.    • Dizziness       Reason for Admission  Fall     Admission Date  11/29/2018    CODE STATUS  DNAR/DNI    HPI & HOSPITAL COURSE  This is a 84 y.o. female here with admitted 11/29/2018 with history of atrial fibrillation on anticoagulation and rate controlled, essential hypertension which is controlled, and hypothyroidism on replacement therapy was in her usual state of health until approximately 2 weeks prior to admission.  At that time, her diuretic therapy was increased out of concern for lower extremity swelling.  Neurology also discontinued clonidine and metolazone. She was found to be orthostatic in the ED. IV fluids were given 250 bolus.  Her Lasix and metolazone were discontinued. She continued to have orthostatic symptoms and was hyponatremic.  She was also noted to have acute on chronic renal disease.  She was then started on 100 mL/h of normal saline.  This improved her renal status and also her symptoms.  Her dizziness resolved and physical therapy had worked with her on balance.  It was suggested that she may take her blood pressure medications in the evening when she is laying down and is HTN at the time.        Therefore, she is discharged in good and stable condition to home with close outpatient follow-up.    The patient met 2-midnight criteria for an inpatient stay at the time of discharge.    Discharge Date  12/1/2018    FOLLOW UP ITEMS POST DISCHARGE  Home PT  Discontinue diuretic therapy  Follow up with nephrology and PCP    DISCHARGE DIAGNOSES  Principal Problem (Resolved):    Collapse POA: Unknown  Active Problems:    Atrial fibrillation (HCC) POA: Yes    Parkinson disease (HCC) POA: Yes    HTN (hypertension) POA: Yes    Diabetes mellitus (HCC) POA: Yes    Hypothyroidism POA: Yes  Resolved Problems:    CARLOS (acute kidney injury) (HCC) POA: Unknown    Hypokalemia  POA: Unknown      Overview: Secondary to diuresis      Will replace orally    Hyponatremia POA: Unknown      Overview: Due to hypovolemia and excess diuresis    High anion gap metabolic acidosis POA: Unknown      Overview: Secondary to Renal failure      Sodium bicarb 25            FOLLOW UP  No future appointments.  No follow-up provider specified.    MEDICATIONS ON DISCHARGE     Medication List      CONTINUE taking these medications      Instructions   allopurinol 100 MG Tabs  Commonly known as:  ZYLOPRIM   Take 100 mg by mouth every morning.  Dose:  100 mg     amLODIPine 5 MG Tabs  Commonly known as:  NORVASC   Doctor's comments:  Take in the morning  Take 1 Tab by mouth every morning.  Dose:  5 mg     bisacodyl EC 5 MG Tbec   Take 5 mg by mouth 1 time daily as needed for Constipation.  Dose:  5 mg     dabigatran 75 MG Caps capsule  Commonly known as:  PRADAXA   Take 75 mg by mouth 2 Times a Day.  Dose:  75 mg     flecainide 100 MG Tabs  Commonly known as:  TAMBOCOR   Take 100 mg by mouth 2 times a day.  Dose:  100 mg     fluoxetine 40 MG capsule  Commonly known as:  PROZAC   Take 40 mg by mouth every morning.  Dose:  40 mg     insulin detemir 100 UNIT/ML Soln  Commonly known as:  LEVEMIR   Inject 35 Units as instructed every evening.  Dose:  35 Units     levothyroxine 112 MCG Tabs  Commonly known as:  SYNTHROID   Take 112 mcg by mouth Every morning on an empty stomach.  Dose:  112 mcg     lisinopril 10 MG Tabs  Commonly known as:  PRINIVIL   Doctor's comments:  Take in the morning  Take 1 Tab by mouth every morning.  Dose:  10 mg     omeprazole 20 MG delayed-release capsule  Commonly known as:  PRILOSEC   Take 20 mg by mouth every morning.  Dose:  20 mg     ropinirole 4 MG tablet  Commonly known as:  REQUIP   Take 4 mg by mouth every morning.  Dose:  4 mg        STOP taking these medications    cloNIDine 0.2 MG Tabs  Commonly known as:  CATAPRESS     furosemide 40 MG Tabs  Commonly known as:  LASIX      metOLazone 2.5 MG Tabs  Commonly known as:  ZAROXOLYN            Allergies  Allergies   Allergen Reactions   • Penicillins      Had as a child, unsure of reaction  About 70 years ago       DIET  Orders Placed This Encounter   Procedures   • Diet Order Consistent Carbohydrate     Standing Status:   Standing     Number of Occurrences:   1     Order Specific Question:   Diet:     Answer:   Consistent Carbohydrate [4]       ACTIVITY  As tolerated and directed by skilled nursing.  Weight bearing as tolerated    CONSULTATIONS  Dr. Weaver Nephrolgy      LABORATORY  Lab Results   Component Value Date    SODIUM 139 11/30/2018    POTASSIUM 3.8 11/30/2018    CHLORIDE 102 11/30/2018    CO2 25 11/30/2018    GLUCOSE 175 (H) 11/30/2018    BUN 85 (HH) 11/30/2018    CREATININE 2.79 (H) 11/30/2018        Lab Results   Component Value Date    WBC 7.0 11/30/2018    HEMOGLOBIN 12.3 11/30/2018    HEMATOCRIT 35.2 (L) 11/30/2018    PLATELETCT 155 (L) 11/30/2018        Total time of the discharge process exceeds 30 minutes.

## 2018-12-01 NOTE — PROGRESS NOTES
Pt unable to discharge today due to heparin drip, must be discontinued for 6 hours prior to DC, and need for home health PT referral to facility. MD aware. RN to call CM to update.

## 2018-12-01 NOTE — DISCHARGE PLANNING
Anticipated Discharge Disposition: Return to group home with home health.    Action: RN CM explained CHOICE for home health to pt. CHOICE obtained and faxed to [facility] at ext. [phone]. Bedside nurse and charge nurse notified.    Barriers to Discharge: Accepting home health agency.    Plan: RN CM will notify team when home health agency obtained.

## 2018-12-02 NOTE — PROGRESS NOTES
[facility] Nephrology Daily Progress Note    Date of Service  12/2/2018    Chief Complaint  The patient is a pleasant 84-year-old female with   past medical history of chronic kidney disease who follows Dr. Garza in the   outpatient setting for her kidney disease, presents to the hospital with   generalized weakness.  In the emergency room, she was found to have a BUN and   creatinine of 113/3.97 mg/dL, with a potassium of 3.1.  On 11/21, her serum   creatinine was 3.15 with a BUN of 99.  Prior to that, it looks like her   baseline has been ranging from 1.7-2.1 mg/dL.  The patient states that she has   been on diuretics and was told to stop yesterday.  Her last dose of   furosemide and metolazone was yesterday.  She denies nonsteroidal   anti-inflammatory drugs.  States her blood pressures have been okay at home.    There are no complaints of diarrhea.  She is more constipated.  There is no   history of nausea or vomiting, except once this past week.  No trouble   urinating.  She denies having any contrast procedures recently.      Interval Problem Update  Daily Nephrology Summary:  11/30/18 - Renal function is improving. Good UOP. KRIS: Bilateral renal cortical thinning without hydronephrosis.  12/1/18 - no new labs, UOP not recorded, feels well, some nausea this AM now resolved  12/2/18 - Renal function continues to improve, resting comfortably with spouse at bedside.      Review of Systems  Review of Systems   Constitutional: Negative for chills and fever.   Respiratory: Negative for shortness of breath and wheezing.    Cardiovascular: Negative for chest pain, palpitations and leg swelling.   Gastrointestinal: Negative for abdominal pain, nausea and vomiting.   Genitourinary: Negative for dysuria, frequency and urgency.   Skin: Negative for itching and rash.   Neurological: Negative for seizures and headaches.        Physical Exam  Temp:  [36.3 °C (97.3 °F)-37 °C (98.6 °F)] 36.8 °C (98.3 °F)  Pulse:  [72-84]  76  Resp:  [16-20] 16  BP: (135-155)/(43-69) 155/56    Physical Exam   Constitutional: She is oriented to person, place, and time. She appears well-developed and well-nourished.   HENT:   Head: Normocephalic and atraumatic.   Eyes: EOM are normal. Right eye exhibits no discharge. Left eye exhibits no discharge. No scleral icterus.   Neck: Normal range of motion. Neck supple. No tracheal deviation present.   Cardiovascular: Normal rate and regular rhythm.    Pulmonary/Chest: Effort normal and breath sounds normal.   Abdominal: Soft. Bowel sounds are normal.   Musculoskeletal: Normal range of motion. She exhibits no edema.   Neurological: She is alert and oriented to person, place, and time.   Skin: Skin is warm and dry.   Psychiatric: She has a normal mood and affect. Her behavior is normal. Judgment and thought content normal.   Nursing note and vitals reviewed.      Fluids    Intake/Output Summary (Last 24 hours) at 12/02/18 1000  Last data filed at 12/02/18 0600   Gross per 24 hour   Intake              390 ml   Output                0 ml   Net              390 ml       Laboratory  Recent Labs      11/30/18   0013   WBC  7.0   RBC  3.86*   HEMOGLOBIN  12.3   HEMATOCRIT  35.2*   MCV  91.2   MCH  31.9   MCHC  34.9   RDW  42.8   PLATELETCT  155*   MPV  10.6     Recent Labs      11/30/18   0013  12/02/18   0236   SODIUM  139  138   POTASSIUM  3.8  4.0   CHLORIDE  102  103   CO2  25  28   GLUCOSE  175*  128*   BUN  85*  41*   CREATININE  2.79*  2.01*   CALCIUM  9.2  9.3     Recent Labs      11/29/18   1715   11/30/18   1153  11/30/18   1944  12/01/18   0150   APTT  75.5*   < >  101.2*  83.0*  79.5*   INR  1.66*   --    --    --    --     < > = values in this interval not displayed.               Imaging       Assessment/Plan  No new Assessment & Plan notes have been filed under this hospital service since the last note was generated.  Service: Nephrology     ASSESSMENT AND PLAN:  1.  Acute kidney injury, likely  secondary to overall volume depletion from over diuresis. Improved with hydration.  2.  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 There is no protein on urinalysis.  It is unclear whether she has diabetic nephropathy.  3.  Hypothyroidism.  4.  Hyperlipidemia.  5.  Atrial fibrillation.  6.  Valvular heart disease.  7.  History of cerebrovascular accident.  8.  Anemia, likely chronic kidney disease. At goal  9.  Hyponatremia. resolved  10.  Hypokalemia. resolved    Plan:  No changes to renal recommendations  Continue to hold diuretics, no need for maintenance IVF at this time  Continue  amlodipine to 10mg qhs  Heparin per primary service  Can f/u with Dr. Garza her nephrologist once discharged from hospital    Other management per hospitalist service

## 2018-12-02 NOTE — PROGRESS NOTES
[facility] Hospitalist Progress Note    Date of Service: 12/2/2018    Chief Complaint  84 y.o. female admitted 11/29/2018 with history of atrial fibrillation on anticoagulation and rate controlled, essential hypertension which is controlled, and hypothyroidism on replacement therapy was in her usual state of health until approximately 2 weeks prior to admission.  At that time, her diuretic therapy was increased out of concern for lower extremity swelling.  She was compliant with this, however she noted weight loss, weakness, chills, and difficulty ambulating.  In the few days prior to admission, she was noted to have several ground-level falls, apparently losing consciousness and during these episodes for several seconds    Since being admitted to the hospital she has been on IV fluids.  Her symptoms have improved and her kidney functions have improved.  She will work with PT and OT.  Patient still continues by dizziness.  She is tolerating her diet well.    Interval Problem Update  12/1: PT OT evaluation determined that she will need home PT, will work on placement.  Said she is markedly improved today and does not complain about dizziness.  He is tolerating her diet well.  Sodium and kidney function has improved.  We will restart home Pradaxa.  12/2: No complaints as of today. Walks to the bathroom without a problem. Still needs insurance approval for home pt.  Consultants     Nephrology    Disposition  Home with home PT        Review of Systems   Constitutional: Negative for chills, fever, malaise/fatigue and weight loss.   HENT: Negative for ear discharge, ear pain, hearing loss, sinus pain, sore throat and tinnitus.    Eyes: Negative for blurred vision, double vision, photophobia, pain, discharge and redness.   Respiratory: Negative for cough, hemoptysis, sputum production, shortness of breath, wheezing and stridor.    Cardiovascular: Negative for chest pain, palpitations, orthopnea, claudication, leg  swelling and PND.   Gastrointestinal: Negative for abdominal pain, blood in stool, constipation, diarrhea, heartburn, melena, nausea and vomiting.   Genitourinary: Negative for dysuria, flank pain, frequency, hematuria and urgency.   Musculoskeletal: Positive for falls. Negative for back pain, joint pain, myalgias and neck pain.   Skin: Negative for itching and rash.   Neurological: Positive for dizziness, tingling, tremors and headaches. Negative for sensory change, speech change, focal weakness, seizures and loss of consciousness.   Endo/Heme/Allergies: Negative for polydipsia. Does not bruise/bleed easily.   Psychiatric/Behavioral: Negative for depression, hallucinations and suicidal ideas. The patient is not nervous/anxious.       Physical Exam  Laboratory/Imaging   Hemodynamics  Temp (24hrs), Av.7 °C (98.1 °F), Min:36.3 °C (97.3 °F), Max:37 °C (98.6 °F)   Temperature: 36.8 °C (98.3 °F)  Pulse  Av.7  Min: 66  Max: 86    Blood Pressure : 155/56      Respiratory      Respiration: 16, Pulse Oximetry: 93 %        RUL Breath Sounds: Clear, RML Breath Sounds: Diminished, RLL Breath Sounds: Diminished, DIDIER Breath Sounds: Clear, LLL Breath Sounds: Diminished    Fluids    Intake/Output Summary (Last 24 hours) at 18 1305  Last data filed at 18 1000   Gross per 24 hour   Intake              630 ml   Output                0 ml   Net              630 ml       Nutrition  Orders Placed This Encounter   Procedures   • Diet Order Consistent Carbohydrate     Standing Status:   Standing     Number of Occurrences:   1     Order Specific Question:   Diet:     Answer:   Consistent Carbohydrate [4]     Physical Exam   Constitutional: She is oriented to person, place, and time. She appears well-developed and well-nourished.   HENT:   Head: Normocephalic and atraumatic.   Right Ear: External ear normal.   Eyes: Pupils are equal, round, and reactive to light. Conjunctivae and EOM are normal. Right eye exhibits no  discharge. Left eye exhibits no discharge. No scleral icterus.   Neck: Normal range of motion. Neck supple. No JVD present. No tracheal deviation present. No thyromegaly present.   Cardiovascular: Normal rate and regular rhythm.  Exam reveals no gallop and no friction rub.    No murmur heard.  Pulmonary/Chest: No respiratory distress. She has no wheezes. She has no rales. She exhibits no tenderness.   Abdominal: She exhibits no distension and no mass. There is no tenderness. There is no rebound and no guarding.   Musculoskeletal: Normal range of motion.   Lymphadenopathy:     She has no cervical adenopathy.   Neurological: She is alert and oriented to person, place, and time. She has normal strength and normal reflexes. A sensory deficit is present. No cranial nerve deficit.   Positive for  Tremors at rest   Skin: No rash noted. No erythema.   Psychiatric: Her behavior is normal.       Recent Labs      11/30/18   0013   WBC  7.0   RBC  3.86*   HEMOGLOBIN  12.3   HEMATOCRIT  35.2*   MCV  91.2   MCH  31.9   MCHC  34.9   RDW  42.8   PLATELETCT  155*   MPV  10.6     Recent Labs      11/30/18   0013  12/02/18   0236   SODIUM  139  138   POTASSIUM  3.8  4.0   CHLORIDE  102  103   CO2  25  28   GLUCOSE  175*  128*   BUN  85*  41*   CREATININE  2.79*  2.01*   CALCIUM  9.2  9.3     Recent Labs      11/29/18   1715   11/30/18   1153  11/30/18   1944  12/01/18   0150   APTT  75.5*   < >  101.2*  83.0*  79.5*   INR  1.66*   --    --    --    --     < > = values in this interval not displayed.                  Assessment/Plan     Atrial fibrillation (HCC)- (present on admission)   Assessment & Plan    Rate controlled, anicoagulated  Rhythm controlled with flecainide    Will restart home Pradaxa     Hypothyroidism- (present on admission)   Assessment & Plan    Replacement therapy      Diabetes mellitus (HCC)- (present on admission)   Assessment & Plan    With hyperglycemia.  Monitor on insulin therapy   Hba1c 8             HTN  (hypertension)- (present on admission)   Assessment & Plan    Controlled with current medication regimen.  Monitor.      Parkinson disease (HCC)- (present on admission)   Assessment & Plan    On requiq 4mg         Quality-Core Measures   Reviewed items::  EKG reviewed, Radiology images reviewed, Labs reviewed and Medications reviewed  Alvarado catheter::  No Alvarado  DVT prophylaxis pharmacological::  Heparin  Ulcer Prophylaxis::  Not indicated  Assessed for rehabilitation services:  Patient was assess for and/or received rehabilitation services during this hospitalization

## 2018-12-02 NOTE — CARE PLAN
Problem: Safety  Goal: Will remain free from falls  Outcome: PROGRESSING AS EXPECTED  Bed alarm is on, safety teaching reinforced

## 2018-12-02 NOTE — CARE PLAN
Problem: Discharge Barriers/Planning  Goal: Patient's continuum of care needs will be met  Outcome: PROGRESSING AS EXPECTED  Awaiting insurance authorization for PT.    Problem: Urinary Elimination:  Goal: Ability to reestablish a normal urinary elimination pattern will improve  Outcome: PROGRESSING AS EXPECTED  Pt ambulates to the bathroom with standby assist and front wheeled walker.

## 2018-12-02 NOTE — PROGRESS NOTES
Pt A&O x4, on room air. Assessment performed. Pt denies any pain at the moment. All needs met. Bed alarm in place. Bed locked and lowest position, treaded socks in place and call light within reach.
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
## 2018-12-03 NOTE — ASSESSMENT & PLAN NOTE
Patient was seen in the hospital after a fall due to dizziness  Likely secondary to hypovolemia and dizziness  Imaging found no acute bleed on ct scan  PT/OT evaluation states she should receive home PT

## 2018-12-03 NOTE — CARE PLAN
Problem: Communication  Goal: The ability to communicate needs accurately and effectively will improve  Pt educated on use of call light and white board for communication, pt verbalizes understanding of white board communication and times of rounding.      Problem: Safety  Goal: Will remain free from injury  Fall precaution in place, mobility assessed and signs placed outside pt door.  Bed alarm turned on and in place.  Reminded pt to call for assistance when she needs to get OOB/assitance.  Pt verbalizes understanding.  Bed in the lowest locked position. Treaded socks on pt. Call light within reach.

## 2018-12-03 NOTE — PROGRESS NOTES
Atrial Fibrillation Admission Alert:    Your patient has been identified through our atrial fibrillation admission alert tracking system to have the diagnosis of atrial fibrillation.    After chart review, you have collapse listed as primary diagnosis.  Could you please go under the problem list and marked collapse as primary diagnosis otherwise patient will wrongly be diagnosed as atrial fibrillation.    Thank you,  Atrial Fibrillation Team

## 2018-12-03 NOTE — PROGRESS NOTES
[facility] Nephrology Daily Progress Note    Date of Service  12/3/2018    Chief Complaint  The patient is a pleasant 84-year-old female with   past medical history of chronic kidney disease who follows Dr. Garza in the   outpatient setting for her kidney disease, presents to the hospital with   generalized weakness.  In the emergency room, she was found to have a BUN and   creatinine of 113/3.97 mg/dL, with a potassium of 3.1.  On 11/21, her serum   creatinine was 3.15 with a BUN of 99.  Prior to that, it looks like her   baseline has been ranging from 1.7-2.1 mg/dL.  The patient states that she has   been on diuretics and was told to stop yesterday.  Her last dose of   furosemide and metolazone was yesterday.  She denies nonsteroidal   anti-inflammatory drugs.  States her blood pressures have been okay at home.    There are no complaints of diarrhea.  She is more constipated.  There is no   history of nausea or vomiting, except once this past week.  No trouble   urinating.  She denies having any contrast procedures recently.      Interval Problem Update  Daily Nephrology Summary:  11/30/18 - Renal function is improving. Good UOP. KRIS: Bilateral renal cortical thinning without hydronephrosis.  12/1/18 - no new labs, UOP not recorded, feels well, some nausea this AM now resolved  12/2/18 - Renal function continues to improve, resting comfortably with spouse at bedside.  12/3/18 -  Renal function improved today scr 1.78.   Pt reports feeling well this morning, asking when she can go home. Hypertensive, consider increasing ACE.     Review of Systems  Review of Systems   Constitutional: Negative for chills and fever.   Respiratory: Negative for shortness of breath and wheezing.    Cardiovascular: Negative for chest pain, palpitations and leg swelling.   Gastrointestinal: Negative for abdominal pain, nausea and vomiting.   Genitourinary: Negative for dysuria, frequency and urgency.   Skin: Negative for itching and  rash.   Neurological: Negative for seizures and headaches.        Physical Exam  Temp:  [35.9 °C (96.6 °F)-36.9 °C (98.5 °F)] 36.9 °C (98.5 °F)  Pulse:  [80-87] 87  Resp:  [18-20] 20  BP: (134-153)/(59-69) 146/68    Physical Exam   Constitutional: She is oriented to person, place, and time. She appears well-developed and well-nourished.   HENT:   Head: Normocephalic and atraumatic.   Eyes: EOM are normal. Right eye exhibits no discharge. Left eye exhibits no discharge. No scleral icterus.   Neck: Normal range of motion. Neck supple. No tracheal deviation present.   Cardiovascular: Normal rate and regular rhythm.    Pulmonary/Chest: Effort normal and breath sounds normal.   Abdominal: Soft. Bowel sounds are normal.   Musculoskeletal: Normal range of motion. She exhibits no edema.   Neurological: She is alert and oriented to person, place, and time.   Skin: Skin is warm and dry.   Psychiatric: She has a normal mood and affect. Her behavior is normal. Judgment and thought content normal.   Nursing note and vitals reviewed.      Fluids    Intake/Output Summary (Last 24 hours) at 12/03/18 0916  Last data filed at 12/02/18 1546   Gross per 24 hour   Intake              360 ml   Output                0 ml   Net              360 ml       Laboratory  Recent Labs      12/03/18   0234   WBC  8.3   RBC  3.88*   HEMOGLOBIN  12.4   HEMATOCRIT  36.9*   MCV  95.1   MCH  32.0   MCHC  33.6   RDW  45.2   PLATELETCT  159*   MPV  10.5     Recent Labs      12/02/18   0236  12/03/18   0234   SODIUM  138  135   POTASSIUM  4.0  3.8   CHLORIDE  103  102   CO2  28  26   GLUCOSE  128*  121*   BUN  41*  33*   CREATININE  2.01*  1.78*   CALCIUM  9.3  9.1     Recent Labs      11/30/18   1153  11/30/18   1944  12/01/18   0150   APTT  101.2*  83.0*  79.5*               Imaging       Assessment/Plan  No new Assessment & Plan notes have been filed under this hospital service since the last note was generated.  Service: Nephrology     ASSESSMENT AND  PLAN:  1.  Acute kidney injury, likely secondary to overall volume depletion from over diuresis. Improved with hydration.  2.  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 There is no protein on urinalysis.  It is unclear whether she has diabetic nephropathy.  3.  Hypothyroidism.  4.  Hyperlipidemia.  5.  Atrial fibrillation.  6.  Valvular heart disease.  7.  History of cerebrovascular accident.  8.  Anemia, likely chronic kidney disease. At goal  9.  Hyponatremia. resolved  10.  Hypokalemia. resolved    Plan:  No changes to renal recommendations  Continue to hold diuretics, no need for maintenance IVF at this time  Continue  amlodipine to 10mg qhs  BP not at goal, less than 140/90 consider increasing lisinopril to 20 mg nightly  Heparin per primary service  Can f/u with Dr. Garza her nephrologist once discharged from hospital    Other management per hospitalist service

## 2018-12-03 NOTE — PROGRESS NOTES
Pt is A&Ox4.  No family is at bedside. VSS.  Denies pain.  Assisted to BR.  One assist w/ FWW up to BR.  Bed alarm turned on.  Calls appropriately.  Pt updated on POC, needs met and questions answered.  Call light within reach and working properly.

## 2018-12-03 NOTE — DISCHARGE SUMMARY
Discharge Summary    CHIEF COMPLAINT ON ADMISSION  Chief Complaint   Patient presents with   • GLF     Pt has fallen 4 times tonight.    • Dizziness       Reason for Admission  Fall     Admission Date  11/29/2018    CODE STATUS  DNAR/DNI    HPI & HOSPITAL COURSE  This is a 84 y.o. female here with She was compliant with this, however she noted weight loss, weakness, chills, and difficulty ambulating.  In the few days prior to admission, she was noted to have several ground-level falls, apparently losing consciousness and during these episodes for several seconds.  She had elevated creatinine secondary to prerenal hypovolemia.  She was also found to have hyponatremia. Since arriving to the hospital she had IV fluids.  Her kidney function improved afterwards.  Her symptoms improved after IV fluid. instead of Pradaxa she was given heparin secondary to her kidney function.  She was sent home on Pradaxa.  She worked with PT OT and determined she is okay to go home with home PT       Therefore, she is discharged in good and stable condition to home with close outpatient follow-up.    The patient met 2-midnight criteria for an inpatient stay at the time of discharge.    Discharge Date  12/3    FOLLOW UP ITEMS POST DISCHARGE  Take bp meds at night  Compression stockings  Stop all diuretics    DISCHARGE DIAGNOSES  Principal Problem (Resolved):    Collapse POA: Unknown  Active Problems:    Atrial fibrillation (HCC) POA: Yes    Parkinson disease (HCC) POA: Yes    HTN (hypertension) POA: Yes    Diabetes mellitus (HCC) POA: Yes    Hypothyroidism POA: Yes  Resolved Problems:    CARLOS (acute kidney injury) (HCC) POA: Unknown    Hypokalemia POA: Unknown      Overview: Secondary to diuresis      Will replace orally    Hyponatremia POA: Unknown      Overview: Due to hypovolemia and excess diuresis    High anion gap metabolic acidosis POA: Unknown      Overview: Secondary to Renal failure      Sodium bicarb 25            FOLLOW UP  No  future appointments.  Lexis Orlando D.O.  [address]  [place] [id number]  [phone]      Please call to schedule your follow up appointment, thank you     [place] [id number] [address] [facility].  [place] [id number]  [phone]          MEDICATIONS ON DISCHARGE     Medication List      CONTINUE taking these medications      Instructions   allopurinol 100 MG Tabs  Commonly known as:  ZYLOPRIM   Take 100 mg by mouth every morning.  Dose:  100 mg     amLODIPine 5 MG Tabs  Commonly known as:  NORVASC   Doctor's comments:  Take in the morning  Take 1 Tab by mouth every morning.  Dose:  5 mg     bisacodyl EC 5 MG Tbec   Take 5 mg by mouth 1 time daily as needed for Constipation.  Dose:  5 mg     dabigatran 75 MG Caps capsule  Commonly known as:  PRADAXA   Take 75 mg by mouth 2 Times a Day.  Dose:  75 mg     flecainide 100 MG Tabs  Commonly known as:  TAMBOCOR   Take 100 mg by mouth 2 times a day.  Dose:  100 mg     fluoxetine 40 MG capsule  Commonly known as:  PROZAC   Take 40 mg by mouth every morning.  Dose:  40 mg     insulin detemir 100 UNIT/ML Soln  Commonly known as:  LEVEMIR   Inject 35 Units as instructed every evening.  Dose:  35 Units     levothyroxine 112 MCG Tabs  Commonly known as:  SYNTHROID   Take 112 mcg by mouth Every morning on an empty stomach.  Dose:  112 mcg     lisinopril 10 MG Tabs  Commonly known as:  PRINIVIL   Doctor's comments:  Take in the morning  Take 1 Tab by mouth every morning.  Dose:  10 mg     omeprazole 20 MG delayed-release capsule  Commonly known as:  PRILOSEC   Take 20 mg by mouth every morning.  Dose:  20 mg     ropinirole 4 MG tablet  Commonly known as:  REQUIP   Take 4 mg by mouth every morning.  Dose:  4 mg        STOP taking these medications    cloNIDine 0.2 MG Tabs  Commonly known as:  CATAPRESS     furosemide 40 MG Tabs  Commonly known as:  LASIX     metOLazone 2.5 MG Tabs  Commonly known as:  ZAROXOLYN            Allergies  Allergies   Allergen Reactions   •  Penicillins      Had as a child, unsure of reaction  About 70 years ago       DIET  Orders Placed This Encounter   Procedures   • Diet Order Consistent Carbohydrate     Standing Status:   Standing     Number of Occurrences:   1     Order Specific Question:   Diet:     Answer:   Consistent Carbohydrate [4]       ACTIVITY  As tolerated and directed by skilled nursing.  Weight bearing as tolerated    CONSULTATIONS   Nephrology      LABORATORY  Lab Results   Component Value Date    SODIUM 135 12/03/2018    POTASSIUM 3.8 12/03/2018    CHLORIDE 102 12/03/2018    CO2 26 12/03/2018    GLUCOSE 121 (H) 12/03/2018    BUN 33 (H) 12/03/2018    CREATININE 1.78 (H) 12/03/2018        Lab Results   Component Value Date    WBC 8.3 12/03/2018    HEMOGLOBIN 12.4 12/03/2018    HEMATOCRIT 36.9 (L) 12/03/2018    PLATELETCT 159 (L) 12/03/2018        Total time of the discharge process exceeds 40 minutes.

## 2018-12-04 NOTE — DISCHARGE INSTRUCTIONS
Discharge Instructions    Discharged to home by car with relative. Discharged via wheelchair, hospital escort: Yes.  Special equipment needed: Walker    Be sure to schedule a follow-up appointment with your primary care doctor or any specialists as instructed.     Discharge Plan:   Diet Plan: Discussed  Activity Level: Discussed  Confirmed Follow up Appointment: Appointment Scheduled  Confirmed Symptoms Management: Discussed  Medication Reconciliation Updated: Yes  Pneumococcal Vaccine Administered/Refused: Given (See MAR)  Influenza Vaccine Indication: Not indicated: Previously immunized this influenza season and > 8 years of age  Influenza Vaccine Given - only chart on this line when given: Influenza Vaccine Given (See MAR)    I understand that a diet low in cholesterol, fat, and sodium is recommended for good health. Unless I have been given specific instructions below for another diet, I accept this instruction as my diet prescription.   Other diet: continuous carb      Special Instructions: None    · Is patient discharged on Warfarin / Coumadin?   No     Depression / Suicide Risk    As you are discharged from this [facility] facility, it is important to learn how to keep safe from harming yourself.    Recognize the warning signs:  · Abrupt changes in personality, positive or negative- including increase in energy   · Giving away possessions  · Change in eating patterns- significant weight changes-  positive or negative  · Change in sleeping patterns- unable to sleep or sleeping all the time   · Unwillingness or inability to communicate  · Depression  · Unusual sadness, discouragement and loneliness  · Talk of wanting to die  · Neglect of personal appearance   · Rebelliousness- reckless behavior  · Withdrawal from people/activities they love  · Confusion- inability to concentrate     If you or a loved one observes any of these behaviors or has concerns about self-harm, here's what you can do:  · Talk about  it- your feelings and reasons for harming yourself  · Remove any means that you might use to hurt yourself (examples: pills, rope, extension cords, firearm)  · Get professional help from the community (Mental Health, Substance Abuse, psychological counseling)  · Do not be alone:Call your Safe Contact- someone whom you trust who will be there for you.  · Call your local CRISIS HOTLINE 797-7227 or 984-236-4579  · Call your local Children's Mobile Crisis Response Team [place] (154) 516-3153 or www.[employer]  · Call the toll free National Suicide Prevention Hotlines   · National Suicide Prevention Lifeline 707-359-OOKQ (0506)  · National Hope Line Network 800-SUICIDE (755-5032)

## 2018-12-28 PROBLEM — J18.9 HCAP (HEALTHCARE-ASSOCIATED PNEUMONIA): Status: ACTIVE | Noted: 2018-01-01

## 2018-12-28 NOTE — ED TRIAGE NOTES
.  Chief Complaint   Patient presents with   • Shortness of Breath     worse x 2 days dx with pneumonia has not taken abx    • Cough     yellow mucus   • Fever     Pt dx with pneumonia 2 days ago prescribed abx however has not started yet.   o2 88% ra 94% 4l nc

## 2018-12-28 NOTE — ED PROVIDER NOTES
ED Provider Note    CHIEF COMPLAINT  Chief Complaint   Patient presents with   • Shortness of Breath     worse x 2 days dx with pneumonia has not taken abx    • Cough     yellow mucus   • Fever       HPI  Cyndi Schwab is a 84 y.o. female who presents for evaluation of cough fever shortness of breath.  Patient has been feeling poorly for several days.  She was seen by her PCP 2 days ago, had a chest x-ray demonstrating early pneumonia.  She was prescribed doxycycline orally but apparently the pharmacy had trouble getting it and they just got the prescription filled and she has not started it.  She denies leg swelling or hemoptysis.  She does report a fever of 104 earlier in the week.    REVIEW OF SYSTEMS  See HPI for further details.  Positive for fever cough shortness of breath all other systems are negative.     PAST MEDICAL HISTORY  Past Medical History:   Diagnosis Date   • Stroke (HCC) 1/2016    left hip weakness-uses walker   • Cancer (HCC) 2006    breast, left  had lumpectomy and radiation   • CATARACT 2005    IOL bilat   • Afib (HCC)     pt has implanted monitor(monitors rhythm only). Dr Camilo Angel.    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   • Sleep apnea     CPAP   • Snoring     has cpap,'have not used in awhile'   • Unspecified disorder of thyroid     on medication   • Unspecified urinary incontinence     mild incontinence       FAMILY HISTORY  No history of bleeding disorder    SOCIAL HISTORY  Social History     Social History   • Marital status:       Spouse name: N/A   • Number of children: N/A   • Years of education: N/A     Social History Main Topics   • Smoking status: Never Smoker   • Smokeless tobacco: Never Used   • Alcohol use No   • Drug use: No   • Sexual activity: Not on file     Other Topics Concern   • Not on file     Social History Narrative   • No narrative on file       SURGICAL HISTORY  Past Surgical History:   Procedure Laterality Date   • IRRIGATION & DEBRIDEMENT ORTHO Right 11/10/2016    Procedure: IRRIGATION & DEBRIDEMENT ORTHO - INFECTED PATELLAR BURSITIS DEBRIDEMENT;  Surgeon: Nick Harrell M.D.;  Location: SURGERY [facility];  Service:    • BREAST BIOPSY  5/21/2013    Performed by Ana Lilia Beach M.D. at SURGERY SAME DAY [facility]   • CARDIAC CATH  2011   • CATARACT PHACO WITH IOL  6/9/2010    left--Performed by ROYAL CONNOLLY at SURGERY SAME DAY [facility]   • MASTECTOMY Left 2006    partial 2006   • CATARACT PHACO WITH IOL Right 2005   • HYSTERECTOMY RADICAL  1972    pre cancerous       CURRENT MEDICATIONS  Home Medications     Reviewed by Pamela Mayer R.N. (Registered Nurse) on 12/28/18 at 1516  Med List Status: Complete   Medication Last Dose Status   allopurinol (ZYLOPRIM) 100 MG Tab 12/28/2018 Active   amLODIPine (NORVASC) 5 MG Tab 12/28/2018 Active   bisacodyl EC (DULCOLAX) 5 MG Tablet Delayed Response 12/28/2018 Active   dabigatran (PRADAXA) 75 MG Cap capsule prn Active   flecainide (TAMBOCOR) 100 MG Tab 12/28/2018 Active   fluoxetine (PROZAC) 40 MG capsule 12/28/2018 Active   insulin detemir (LEVEMIR) 100 UNIT/ML Solution  Active   levothyroxine (SYNTHROID) 112 MCG Tab 12/28/2018 Active   lisinopril (PRINIVIL) 10 MG Tab 12/28/2018 Active   omeprazole (PRILOSEC) 20 MG delayed-release capsule 12/28/2018 Active   ropinirole (REQUIP) 4 MG tablet 12/28/2018 Active                ALLERGIES  Allergies   Allergen Reactions   • Penicillins      Had as a child, unsure of reaction  About 70 years ago       PHYSICAL  EXAM  VITAL SIGNS: /57   Pulse 85   Temp 37.9 °C (100.2 °F) (Temporal)   Resp (!) 25   Wt 74.8 kg (165 lb)   SpO2 92%   BMI 32.22 kg/m²  Room air O2: 88    Constitutional: Elderly ill-appearing  HENT: Normocephalic, Atraumatic, Bilateral external ears normal, Oropharynx moist, No oral exudates, Nose normal.   Eyes: PERRLA, EOMI, Conjunctiva normal, No discharge.   Neck: Normal range of motion, No tenderness, Supple, No stridor.   Lymphatic: No lymphadenopathy noted.   Cardiovascular: Normal heart rate, Normal rhythm, No murmurs, No rubs, No gallops.   Thorax & Lungs: Bilateral rhonchi right greater than left no wheezing, No chest tenderness.   Abdomen: Bowel sounds normal, Soft, No tenderness, No masses, No pulsatile masses.   Skin: Warm, Dry, No erythema, No rash.   Back: No tenderness, No CVA tenderness.   Extremities: Intact distal pulses, No edema, No tenderness, No cyanosis, No clubbing.   Musculoskeletal: Good range of motion in all major joints. No tenderness to palpation or major deformities noted.   Neurologic: Alert & oriented x 3, Normal motor function, Normal sensory function, No focal deficits noted.   Psychiatric: Anxious    RADIOLOGY/PROCEDURES  DX-CHEST-PORTABLE (1 VIEW)   Final Result      1.  Moderate cardiomegaly.      2.  Increasing pneumonitis in the right lung.        Results for orders placed or performed during the hospital encounter of 12/28/18   Lactic acid (lactate)   Result Value Ref Range    Lactic Acid 1.1 0.5 - 2.0 mmol/L   CBC WITH DIFFERENTIAL   Result Value Ref Range    WBC 13.4 (H) 4.8 - 10.8 K/uL    RBC 3.25 (L) 4.20 - 5.40 M/uL    Hemoglobin 10.1 (L) 12.0 - 16.0 g/dL    Hematocrit 29.8 (L) 37.0 - 47.0 %    MCV 91.7 81.4 - 97.8 fL    MCH 31.1 27.0 - 33.0 pg    MCHC 33.9 33.6 - 35.0 g/dL    RDW 46.3 35.9 - 50.0 fL    Platelet Count 188 164 - 446 K/uL    MPV 10.1 9.0 - 12.9 fL    Neutrophils-Polys 89.20 (H) 44.00 - 72.00 %    Lymphocytes 4.70 (L) 22.00 - 41.00 %     Monocytes 4.60 0.00 - 13.40 %    Eosinophils 1.00 0.00 - 6.90 %    Basophils 0.10 0.00 - 1.80 %    Immature Granulocytes 0.40 0.00 - 0.90 %    Nucleated RBC 0.00 /100 WBC    Neutrophils (Absolute) 11.88 (H) 2.00 - 7.15 K/uL    Lymphs (Absolute) 0.63 (L) 1.00 - 4.80 K/uL    Monos (Absolute) 0.62 0.00 - 0.85 K/uL    Eos (Absolute) 0.14 0.00 - 0.51 K/uL    Baso (Absolute) 0.02 0.00 - 0.12 K/uL    Immature Granulocytes (abs) 0.06 0.00 - 0.11 K/uL    NRBC (Absolute) 0.00 K/uL   COMP METABOLIC PANEL   Result Value Ref Range    Sodium 136 135 - 145 mmol/L    Potassium 4.1 3.6 - 5.5 mmol/L    Chloride 107 96 - 112 mmol/L    Co2 19 (L) 20 - 33 mmol/L    Anion Gap 10.0 0.0 - 11.9    Glucose 79 65 - 99 mg/dL    Bun 21 8 - 22 mg/dL    Creatinine 1.15 0.50 - 1.40 mg/dL    Calcium 8.4 (L) 8.5 - 10.5 mg/dL    AST(SGOT) 18 12 - 45 U/L    ALT(SGPT) 11 2 - 50 U/L    Alkaline Phosphatase 105 (H) 30 - 99 U/L    Total Bilirubin 0.6 0.1 - 1.5 mg/dL    Albumin 2.9 (L) 3.2 - 4.9 g/dL    Total Protein 5.7 (L) 6.0 - 8.2 g/dL    Globulin 2.8 1.9 - 3.5 g/dL    A-G Ratio 1.0 g/dL   Influenza A/B By PCR   Result Value Ref Range    Influenza virus A RNA Negative Negative    Influenza virus B, PCR Negative Negative   ESTIMATED GFR   Result Value Ref Range    GFR If  54 (A) >60 mL/min/1.73 m 2    GFR If Non African American 45 (A) >60 mL/min/1.73 m 2        COURSE & MEDICAL DECISION MAKING  Pertinent Labs & Imaging studies reviewed. (See chart for details)  Patient presents here with low-grade fever cough hypoxia.  Septic protocol was initiated.  Her lactic acid is reassuring however she has leukocytosis, evidence of worsening right lower lobe pneumonia on radiograph and hypoxia.  She was given IV Rocephin and azithromycin.  She will be admitted to internal medicine for further treatment and evaluation of community-acquired pneumonia    FINAL IMPRESSION  1.  Community-acquired pneumonia with hypoxia    Admission          Electronically signed by: Mono Molina, 12/28/2018 3:24 PM

## 2018-12-29 PROBLEM — J80 ARDS (ADULT RESPIRATORY DISTRESS SYNDROME) (HCC): Status: ACTIVE | Noted: 2018-01-01

## 2018-12-29 PROBLEM — E83.42 HYPOMAGNESEMIA: Status: ACTIVE | Noted: 2018-01-01

## 2018-12-29 PROBLEM — J96.01 ACUTE RESPIRATORY FAILURE WITH HYPOXIA (HCC): Status: ACTIVE | Noted: 2018-01-01

## 2018-12-29 PROBLEM — R65.20 SEVERE SEPSIS (HCC): Status: ACTIVE | Noted: 2018-01-01

## 2018-12-29 PROBLEM — I50.32 CHRONIC DIASTOLIC (CONGESTIVE) HEART FAILURE (HCC): Status: ACTIVE | Noted: 2018-01-01

## 2018-12-29 PROBLEM — J98.11 ATELECTASIS: Status: ACTIVE | Noted: 2018-01-01

## 2018-12-29 PROBLEM — A41.9 SEVERE SEPSIS (HCC): Status: ACTIVE | Noted: 2018-01-01

## 2018-12-29 NOTE — ASSESSMENT & PLAN NOTE
Follow-up bronchoscopy culture results  Continue cefepime and Zyvox for now. Stop Zyvox uf cultures remaind negativeand nasal swbb negaive

## 2018-12-29 NOTE — ASSESSMENT & PLAN NOTE
Emergently intubated on 12/29  All appropriate ventilator bundles have been initiated  Continue full vent support    Not candidate now for wean/extubation (too sick)

## 2018-12-29 NOTE — RESPIRATORY CARE
COPD EDUCATION by COPD CLINICAL EDUCATOR  12/29/2018 at 7:02 AM by Felicia Arriaga     Patient reviewed by COPD education team. Patient does not qualify for COPD program.

## 2018-12-29 NOTE — ASSESSMENT & PLAN NOTE
Blood sugars have been high  Start NPH, 15 units twice daily  Continue sliding scale insulin  Increase long-acting to better control BS

## 2018-12-29 NOTE — PROCEDURES
Date of Procedure:  12/29/2018    Title of Procedure:  Diagnostic and therapeutic flexible fiberoptic bronchoscopy with bronchoalveolar lavage    Indication for Procedure:   Atelectasis    Post-procedure Diagnoses:    1.  Normal endobronchial anatomy  2.  No endobronchial tumor identified  3.  Scant white secretions seen bilaterally.  All secretions suctioned until clear.    Narrative:    The patient was sedated, intubated and ventilated at the time of this procedure.  The flexible fiberoptic bronchoscope was inserted through the lumen of the endotracheal tube and advanced into the distal trachea without difficulty.  The airways were examined to the subsegmental bronchus level bilaterally.    The endobronchial anatomy was normal.  No tumor was identified.    There were scant white secretions seen bilaterally.  All the secretions were suctioned until clear.    Bronchoalveolar lavage was carried out in the basilar segments of the right lower lobe using the standard technique with good fluid return.    Bronchoalveolar lavage fluid from the right lower lobe is submitted to the laboratory for cytology, gram stain, culture and sensitivity, acid fast bacilli smear and culture and fungal culture.    The patient tolerated the procedure quite nicely.  No complications were apparent.  The heart rate and rhythm, blood pressure and oxygenation saturation were continuously monitored.      Kendall Zuniga MD  Pulmonary and Critical Care Medicine

## 2018-12-29 NOTE — PROGRESS NOTES
Pt requiring more oxygen to maintain sats, increased work of breathing. Placed on oxymask now switched to non rebreather and RT notified. RT came to bedside and evaluated patient and started pt on hi felisha 02, pt sats stabalized and tolerating hi felisha ok. Continuous pulse ox on and monitoring in place.

## 2018-12-29 NOTE — ASSESSMENT & PLAN NOTE
Cultures with no growth to date  Severe with sepsis and respiratory distress  She has risk factors for healthcare associated infection  Treat with broad-spectrum cefepime and linezolid

## 2018-12-29 NOTE — ED NOTES
Med rec completed per pt and MAR  Allergies reviewed    Pt completed a 6 day course of Levofloxacin on 12/24/18    Pt has Doxycycline 100 mg for 10 days, but has not yet started the course

## 2018-12-29 NOTE — ASSESSMENT & PLAN NOTE
Paroxysmal atrial fibrillation  Chronically anticoagulated with Pradaxa - continue to hold Pradaxa as we cannot administer it through her cortrak  Continue heparin drip on the weight-based protocol  Decrease flecainide, 50 mg twice daily

## 2018-12-29 NOTE — ASSESSMENT & PLAN NOTE
Associated acute respiratory failure  Pulmonary source    Now on two pressors and dramatically worsening renal function  Shock secondary to pneumonia, no defintive organism yet defined  Continue broad spectrum antibiotics

## 2018-12-29 NOTE — PROGRESS NOTES
Pharmacy Kinetics 84 y.o. female on vancomycin day # 1 2018    Currently on Vancomycin 1900 mg iv load, given @2100    Indication for Treatment: PNA    Pertinent history per medical record:   85 yo female with PMH of AFib, Parkinson's, CVA admitted on 2018 for SIRS, CAP.  Pt presented with complaint of cough with SOB x2 days and a recent h/o PNA earlier in Dec.  Pt resides at a SNF.  Physical exam positive for wheezing.  CXR positive for increasing pneumonitis in right lung.    Other antibiotics:   - Cefepime 2 g iv q24h    Allergies: Penicillins     List concerns for renal function:   - Leukocytosis, tachypnea, fever  - Age  - Low albumin  - Concern of CARLOS since admission - increase in SCr by 0.15 over 12 hours    Pertinent cultures to date:   - None    Recent Labs      18   1543  18   0249   WBC  13.4*  16.5*   NEUTSPOLYS  89.20*  93.30*     Recent Labs      18   1543  18   0249   BUN  21  22   CREATININE  1.15  1.30   ALBUMIN  2.9*   --      No results for input(s): VANCOTROUGH, VANCOPEAK, VANCORANDOM in the last 72 hours.  Intake/Output Summary (Last 24 hours) at 18 0437  Last data filed at 18 1743   Gross per 24 hour   Intake              100 ml   Output                0 ml   Net              100 ml      Blood pressure 119/56, pulse 88, temperature 37.8 °C (100.1 °F), temperature source Temporal, resp. rate (!) 22, weight 75.4 kg (166 lb 3.6 oz), SpO2 96 %, not currently breastfeeding. Temp (24hrs), Av.8 °C (100.1 °F), Min:37.8 °C (100 °F), Max:37.9 °C (100.2 °F)      A/P   1. Vancomycin dose change: Pulse dose based on random level  2. Next vancomycin level: 12-hour post load (ordered)  3. Goal trough: 16 to 20 mcg/mL  4. Comments: Due to increase in SCr since admission, will schedule subsequent maintenance doses based on a post-loading dose random level.  Pharmacy to monitor and make dose adjustments as appropriate.      Zac Bustillos, JoseD

## 2018-12-29 NOTE — CONSULTS
PULMONARY AND CRITICAL CARE MEDICINE CONSULTATION    Date of Consultation:  12/29/2018    Requesting Physician:  Genia Winchester MD    Consulting Physician:  Kendall Zuniga MD    Reason for Consultation: Critical care management in lady with respiratory failure, severe sepsis and pneumonia    Chief Complaint: Dyspnea    History of Present Illness:    I was kindly asked to see and evaluate Cyndi Schwab, a 84 y.o. female for evaluation and management of the above problem.    This lady has a history of atrial fibrillation and is chronically anticoagulated with Pradaxa, Parkinson's disease, stroke, diabetes mellitus, hypothyroidism, hypertension, chronic diastolic heart failure with grade 1 diastolic dysfunction and moderate mitral regurgitation.  She resides in an assisted living facility.  She was admitted to [facility] on or about 12/28/2018 with 2 days of cough and shortness of breath.  She was apparently seen a couple of days prior to presentation with cough and dyspnea.  She was prescribed doxycycline, however was unable to fill her prescription.  She was admitted to [facility] yesterday with a diagnosis of pneumonia.  She was placed on broad-spectrum intravenous antimicrobial chemotherapy.  She was recently hospitalized here from 11/29/2018 to 12/3/2018 with weight loss, weakness and difficulty ambulating.  She has had recent ground-level falls.  Today she deteriorated and has been experiencing increasing shortness of breath and respiratory distress.  She has increasing oxygen requirements.  Earlier today she was on a 4 L nasal cannula, however she is now requiring a high flow nasal cannula to maintain an adequate oxygen saturation.  She is complaining of being very short of breath and her respiratory rate is in the 30s and 40s.  She has a dry cough.  She has a fever and she does not feel well.    Medications Prior to Admission:    No current facility-administered medications on file prior to encounter.       Current Outpatient Prescriptions on File Prior to Encounter   Medication Sig Dispense Refill   • lisinopril (PRINIVIL) 10 MG Tab Take 1 Tab by mouth every morning. 30 Tab    • amLODIPine (NORVASC) 5 MG Tab Take 1 Tab by mouth every morning. 30 Tab    • dabigatran (PRADAXA) 75 MG Cap capsule Take 75 mg by mouth 2 Times a Day.     • ropinirole (REQUIP) 4 MG tablet Take 4 mg by mouth every morning.     • levothyroxine (SYNTHROID) 112 MCG Tab Take 112 mcg by mouth Every morning on an empty stomach.     • bisacodyl EC (DULCOLAX) 5 MG Tablet Delayed Response Take 5 mg by mouth 1 time daily as needed for Constipation.     • insulin detemir (LEVEMIR) 100 UNIT/ML Solution Inject 35 Units as instructed every evening.     • flecainide (TAMBOCOR) 100 MG Tab Take 100 mg by mouth 2 times a day.     • omeprazole (PRILOSEC) 20 MG delayed-release capsule Take 20 mg by mouth every morning.     • allopurinol (ZYLOPRIM) 100 MG Tab Take 100 mg by mouth every morning.     • fluoxetine (PROZAC) 40 MG capsule Take 40 mg by mouth every morning.         Current Medications:      Current Facility-Administered Medications:   •  Linezolid (ZYVOX) premix 600 mg, 600 mg, Intravenous, Q12HRS, Genia Winchester M.D., Last Rate: 300 mL/hr at 12/29/18 1256, 600 mg at 12/29/18 1256  •  magnesium sulfate IVPB premix 4 g, 4 g, Intravenous, Once, Kendall Zuniga M.D., Last Rate: 25 mL/hr at 12/29/18 1303, 4 g at 12/29/18 1303  •  [START ON 12/30/2018] omeprazole 2 mg/mL in sodium bicarbonate (PRILOSEC) oral susp 40 mg, 40 mg, Feeding Tube, DAILY, Kendall Zuniga M.D.  •  Respiratory Care per Protocol, , Nebulization, Continuous RT, Kendall Zuniga M.D.  •  senna-docusate (PERICOLACE or SENOKOT S) 8.6-50 MG per tablet 2 Tab, 2 Tab, Feeding Tube, BID **AND** polyethylene glycol/lytes (MIRALAX) PACKET 1 Packet, 1 Packet, Feeding Tube, QDAY PRN **AND** magnesium hydroxide (MILK OF MAGNESIA) suspension 30 mL, 30 mL, Feeding Tube, QDAY PRN  **AND** bisacodyl (DULCOLAX) suppository 10 mg, 10 mg, Rectal, QDAY PRN, Kendall Zuniga M.D.  •  MD Alert...ICU Electrolyte Replacement per Pharmacy, , Other, pharmacy to dose, Kendall Zuniga M.D.  •  Pharmacy Consult: Enteral tube feeding - review meds/change route/product selection, , Other, PRN, Kendall Zuniga M.D.  •  fentaNYL (SUBLIMAZE) injection 25 mcg, 25 mcg, Intravenous, Q HOUR PRN **OR** fentaNYL (SUBLIMAZE) injection 50 mcg, 50 mcg, Intravenous, Q HOUR PRN **OR** fentaNYL (SUBLIMAZE) injection 100 mcg, 100 mcg, Intravenous, Q HOUR PRN, Kendall Zuniga M.D.  •  fentaNYL (SUBLIMAZE) 50 mcg/mL in 50mL, , Intravenous, Continuous, Kendall Zuniga M.D.  •  ipratropium-albuterol (DUONEB) nebulizer solution, 3 mL, Nebulization, Q2HRS PRN (RT), Kendall Zuniga M.D.  •  ipratropium-albuterol (DUONEB) nebulizer solution, 3 mL, Nebulization, Q4HRS (RT), Kendall Zuniga M.D.  •  insulin regular (HUMULIN R) injection 2-9 Units, 2-9 Units, Subcutaneous, Q6HRS **AND** Accu-Chek Q6 if NPO, , , Q6H **AND** NOTIFY MD and PharmD, , , Once **AND** glucose 4 g chewable tablet 16 g, 16 g, Feeding Tube, Q15 MIN PRN **AND** dextrose 50% (D50W) injection 25 mL, 25 mL, Intravenous, Q15 MIN PRN, Kendall Zuniga M.D.  •  PHENYLEPHRINE HCL-NACL 1-0.9 MG/10ML-% IV SOSY, , , , , Stopped at 12/29/18 1345  •  propofol (DIPRIVAN) injection, 0-80 mcg/kg/min, Intravenous, Continuous **AND** Triglycerides Starting now and then Every 3 Days, , , Every 3 Days (0300), Kendall Zuniga M.D.  •  FENTANYL CITRATE (PF) 0.05 MG/ML INJ SOLN, , , ,   •  heparin injection 3,200 Units, 3,200 Units, Intravenous, PRN **AND** heparin infusion 25,000 units in 500 ml 0.45% nacl, , Intravenous, Continuous **AND** Protocol 440 Heparin Weight Based DO NOT GIVE ANY HEPARIN BOLUS TO STROKE PATIENT, , , CONTINUOUS **AND** Protocol 440 Heparin Weight Based Discontinue Enoxaparin (Lovenox), Dabigatran  (Pradaxa), Rivaroxaban (Xarelto), Apixaban (Eliquis), Edoxaban (Savaysa, Lixiana), Fondaparinux (Arixtra) and Argatroban prior to heparin administration, , , Once **AND** Protocol 440 Heparin Weight Based Draw baseline aPTT, PT, and platelet count if not already done, , , CONTINUOUS **AND** Protocol 440 Heparin Weight Based Draw aPTT 6 hours after beginning infusion. , , , CONTINUOUS **AND** Protocol 440 Heparin Weight Based Record Patient Data, , , CONTINUOUS **AND** Protocol 440 Heparin Weight Based INSTRUCTIONS, , , CONTINUOUS **AND** Protocol 440 Heparin Weight Based Review aPTT results 6 hours after infusion is begun as detailed, , , CONTINUOUS **AND** Protocol 440 Heparin Weight Based Draw Platelet count every three days. Contact MD if platelet is 50% lower than baseline count., , , CONTINUOUS **AND** Protocol 440 Heparin Weight Based Adjust heparin to maintain aPTT between 55-96 sec, , , CONTINUOUS **AND** Protocol 440 Heparin Weight Based Order aPTT 6 hours after any rate change or hold until aPTT is therapeutic (55-96 seconds), , , CONTINUOUS **AND** Protocol 440 Heparin Weight Based Documentation and verification, , , CONTINUOUS, Kendall Zuniga M.D.  •  allopurinol (ZYLOPRIM) tablet 100 mg, 100 mg, Oral, QAM, Shannon Alcantar D.O., 100 mg at 12/29/18 0614  •  amLODIPine (NORVASC) tablet 5 mg, 5 mg, Oral, Q DAY, Shannon Alcantar D.O., 5 mg at 12/29/18 0614  •  bisacodyl EC (DULCOLAX) tablet 5 mg, 5 mg, Oral, QDAY PRN, Shannon Alcantar D.O.  •  flecainide (TAMBOCOR) tablet 100 mg, 100 mg, Oral, BID, Shannon Alcantar D.O., 100 mg at 12/29/18 0614  •  FLUoxetine (PROZAC) capsule 40 mg, 40 mg, Oral, QAM, Shannon Alcantar D.O., 40 mg at 12/29/18 0614  •  levothyroxine (SYNTHROID) tablet 112 mcg, 112 mcg, Oral, AM ES, LIDIA MandujanoO., 112 mcg at 12/29/18 0614  •  lisinopril (PRINIVIL) tablet 10 mg, 10 mg, Oral, QAANSELMO, SONYA Mandujano.O., 10 mg at 12/29/18 0614  •  ROPINIRole (REQUIP) tablet 4 mg, 4 mg, Oral, Shannon RANKIN  Katharine D.OColin, 4 mg at 12/29/18 0614  •  acetaminophen (TYLENOL) tablet 650 mg, 650 mg, Oral, Q6HRS PRN, SONYA Mandujano.O., 650 mg at 12/29/18 1302  •  cefepime (MAXIPIME) 2 g in  mL IVPB, 2 g, Intravenous, Q24HRS, Shannon Alcantar D.O.    Allergies:    Penicillins    Past Surgical History:    Past Surgical History:   Procedure Laterality Date   • IRRIGATION & DEBRIDEMENT ORTHO Right 11/10/2016    Procedure: IRRIGATION & DEBRIDEMENT ORTHO - INFECTED PATELLAR BURSITIS DEBRIDEMENT;  Surgeon: Nick Harrell M.D.;  Location: SURGERY [facility];  Service:    • BREAST BIOPSY  5/21/2013    Performed by Ana Lilia Beach M.D. at SURGERY SAME DAY [facility] ORS   • CARDIAC CATH  2011   • CATARACT PHACO WITH IOL  6/9/2010    left--Performed by ROYAL CONNOLLY at SURGERY SAME DAY [facility] ORS   • MASTECTOMY Left 2006    partial 2006   • CATARACT PHACO WITH IOL Right 2005   • HYSTERECTOMY RADICAL  1972    pre cancerous       Past Medical History:    Past Medical History:   Diagnosis Date   • Afib (HCC)     pt has implanted monitor(monitors rhythm only). Dr Camilo Angel.    • Arthritis     back, hands   • Breath shortness     with activity(2016) since CVA 1/2016   • Cancer (HCC) 2006    breast, left  had lumpectomy and radiation   • CATARACT 2005    IOL bilat   • Diabetes     on insulin    • Heart valve disease     patient has  heart mumur   • High cholesterol    • Hypertension    • Indigestion     2016-resolved   • Other specified disorder of intestines     constipation   • Pain     chronic back   • Parkinson's disease    • Pneumonia 0ctober of 2015   • Psychiatric problem     depression   • Renal disorder     stage 3-4   • Sleep apnea     CPAP   • Stroke (HCC) 1/2016    left hip weakness-uses walker   • Unspecified disorder of thyroid     on medication   • Unspecified urinary incontinence     mild incontinence       Social History:    Social History     Social History   • Marital status:      Spouse name: N/A   •  Number of children: N/A   • Years of education: N/A     Occupational History   • Not on file.     Social History Main Topics   • Smoking status: Never Smoker   • Smokeless tobacco: Never Used   • Alcohol use No   • Drug use: No   • Sexual activity: Not on file     Other Topics Concern   • Not on file     Social History Narrative   • No narrative on file       Family History:    Family History   Problem Relation Age of Onset   • Stroke Mother    • Hypertension Mother        Review of System:    Review of Systems   Constitutional: Positive for chills, diaphoresis, fever and malaise/fatigue.   HENT: Negative for ear pain, nosebleeds and sinus pain.    Eyes: Negative for blurred vision, double vision and photophobia.   Respiratory: Positive for cough and shortness of breath. Negative for hemoptysis, sputum production and stridor.    Cardiovascular: Positive for chest pain. Negative for claudication and leg swelling.   Gastrointestinal: Positive for constipation. Negative for abdominal pain, nausea and vomiting.   Genitourinary: Negative for dysuria, hematuria and urgency.   Musculoskeletal: Negative for back pain, myalgias and neck pain.   Skin: Negative for rash.   Neurological: Negative for speech change, focal weakness, seizures and headaches.   Endo/Heme/Allergies: Does not bruise/bleed easily.   Psychiatric/Behavioral: Negative for depression, hallucinations and substance abuse. The patient is nervous/anxious.        Physical Examination:    /55   Pulse 92   Temp 36.9 °C (98.4 °F) (Temporal)   Resp (!) 45   Wt 77.1 kg (169 lb 15.6 oz)   SpO2 90%   Breastfeeding? No   BMI 33.20 kg/m²   Physical Exam   Constitutional: She is oriented to person, place, and time. She appears distressed.   HENT:   Head: Normocephalic and atraumatic.   Right Ear: External ear normal.   Left Ear: External ear normal.   Nose: Nose normal.   Mouth/Throat: Oropharynx is clear and moist.   Eyes: Pupils are equal, round, and  reactive to light. Conjunctivae are normal. Right eye exhibits no discharge. Left eye exhibits no discharge.   Neck: Normal range of motion. Neck supple. No JVD present. No tracheal deviation present.   Cardiovascular: Intact distal pulses.  Exam reveals no gallop and no friction rub.    Sinus tachycardia   Pulmonary/Chest: No stridor. She has no wheezes. She has rales (Scattered fine crackles bilaterally).   Abdominal: Soft. Bowel sounds are normal. She exhibits no distension. There is no tenderness. There is no rebound.   Musculoskeletal: She exhibits no edema, tenderness or deformity.   No clubbing or cyanosis   Neurological: She is alert and oriented to person, place, and time. No cranial nerve deficit. Coordination normal. GCS score is 15.   No focal weakness   Skin: Skin is warm and dry. No rash noted. She is not diaphoretic. No erythema.       Laboratory Data:        Recent Labs      12/28/18   1543  12/29/18   0249   WBC  13.4*  16.5*   RBC  3.25*  3.12*   HEMOGLOBIN  10.1*  9.7*   HEMATOCRIT  29.8*  29.1*   MCV  91.7  93.3   MCH  31.1  31.1   MCHC  33.9  33.3*   RDW  46.3  46.7   PLATELETCT  188  171   MPV  10.1  9.9     Recent Labs      12/28/18   1543  12/29/18   0249   SODIUM  136  138   POTASSIUM  4.1  4.0   CHLORIDE  107  109   CO2  19*  20   GLUCOSE  79  117*   BUN  21  22   CREATININE  1.15  1.30   CALCIUM  8.4*  7.7*     Recent Labs      12/29/18   0247   BNPBTYPENAT  705*     Recent Labs      12/29/18   0247   BNPBTYPENAT  705*           Imaging:    I personally viewed the CXR and CT scan images as well as reviewed the radiology interpretation reports.    DX-CHEST-PORTABLE (1 VIEW)   Final Result      1.  There are changes of vascular congestion/edema.   2.  Interval increase in parenchymal opacity suspicious for increasing changes of edema or pneumonia.      DX-CHEST-PORTABLE (1 VIEW)   Final Result      1.  Moderate cardiomegaly.      2.  Increasing pneumonitis in the right lung.       EC-ECHOCARDIOGRAM COMPLETE W/ CONT    (Results Pending)   DX-ABDOMEN FOR TUBE PLACEMENT    (Results Pending)   DX-CHEST-PORTABLE (1 VIEW)    (Results Pending)       Assessment and Plan:    * Acute respiratory failure with hypoxia (HCC)   Assessment & Plan    Emergently intubated on 12/29  I initiated full vent support  All appropriate ventilator bundles have been initiated     Severe sepsis ([facility])   Assessment & Plan    Associated acute respiratory failure  Pulmonary source  Lactic acid is normal  Continue empiric antibiotics     HCAP (healthcare-associated pneumonia)   Assessment & Plan    Bronchoscopy with BAL performed and appropriate cultures sent  Continue empiric cefepime and Zyvox     Chronic diastolic (congestive) heart failure (HCC)   Assessment & Plan    Grade 1 diastolic dysfunction     Diabetes mellitus (HCC)- (present on admission)   Assessment & Plan    Continue sliding scale insulin  Monitor blood glucose  Stop Lantus for now     HTN (hypertension)- (present on admission)   Assessment & Plan    Monitor blood pressure closely  Continue lisinopril, 10 mg daily  Continue amlodipine, 5 mg daily     Hypomagnesemia   Assessment & Plan    Replete magnesium     Atrial fibrillation (HCC)- (present on admission)   Assessment & Plan    Paroxysmal atrial fibrillation  Chronically anticoagulated with Pradaxa - will stop Pradaxa as we cannot administer it through her cortrak  Start heparin drip on weight-based protocol  Continue flecainide, 100 mg twice daily     Parkinson disease (HCC)- (present on admission)   Assessment & Plan    Continue ropinirole, 4 mg daily     Hypothyroidism- (present on admission)   Assessment & Plan    Continue Synthroid, 112 mcg daily       I have assessed and reassessed her respiratory status and made ventilator adjustments based upon arterial blood gas analysis as well as analysis of ventilator waveforms and airway mechanics.  I have assessed and reassessed her blood pressure,  hemodynamics, cardiovascular status and neurologic status.  She is at increased risk for worsening respiratory and cardiovascular system dysfunction.    High risk of deterioration and worsening vital organ dysfunction and death without the above critical care interventions.    Thank you for allowing me to participate in the care of this lady.  I will continue to follow her with great interest.    Critical Care Time: 35 minutes  [id number]  No time overlap  Time excludes procedures  Discussed with RN, RT, clinical pharmacist, hospitalist,     Kendall Zuniga MD  Pulmonary and Critical Care Medicine

## 2018-12-29 NOTE — PROCEDURES
Date of service:  12/29/2018    Title:  Central venous catheter placement - internal jugular vein    Indication: Severe sepsis.  Poor peripheral venous access.  Needs central venous access for medication administration.    Narrative:    The right neck was prepped with chlorhexidine and draped in the usual sterile fashion.  1% Xylocaine solution was used for topical anesthesia.  A triple lumen central venous catheter was placed into the right internal jugular vein under ultrasound guidance using the technique described by Johanna without difficulty or apparent complication.  The line was sutured into place and a sterile dressing was placed over the line.  All 3 ports flush and return venous blood easily.  The patient tolerated the procedure quite nicely.  No complications are apparent.  A STAT CXR is ordered to confirm placement.      Kendall Zuniga MD  Pulmonary and Critical Care Medicine

## 2018-12-29 NOTE — DIETARY
Nutrition Support Assessment     Day 1 of admit.  Cyndi Schwab is a 84 y.o. female with admitting DX of SIRS (systemic inflammatory response syndrome) (HCC), CAP (community acquired pneumonia), Volume overload, ARDS (adult respiratory distress syndrome) (HCC), HCAP (healthcare-associated pneumonia)     Current problem list:  1. Pt Dx'd with PNA but was not taking prescribed Abx.  2. Pt intubated this afternoon.   3. H/o CVA, DM, CKD, Parkinson's     Assessment:  Estimated Nutritional Needs: based on:   Height: 152.4 cm (5')  Weight: 77.1 kg (169 lb 15.6 oz)  Ideal Body Weight: 45.4 kg (100 lb)  Percent Ideal Body Weight: 170  Body mass index is 33.2 kg/m².    Calculation/Equation: REE per MSJ = 1144 kcal/day; RMR per PSU (vent L/min 7.3, T max/24 hours 37.9) = 1415 kcal/day (65-70% = 920-991 kcal/day)  Total Calories / day: 848 - 1079 (Calories / k - 14)  Total Grams Protein / day: 91 (Grams Protein / kg IBW: 2.0) (1.2 grams protein/kg = 93 grams/day)     Evaluation:   1. Pt is intubated and unable to take PO diet.   2. Estimated needs based on critical care obesity guidelines (BMI >30).  3. Labs and meds reviewed. Procalcitonin 1.66.  4. Low-carbohydrate, high-protein TF formula is indicated       Malnutrition Risk: Poor PO intake noted per nutritional admit screen.     Recommendations/Plan:  1. Start Peptamen Intense VHP @ 25 ml/hr advance per protocol to goal rate 45 ml/hr to provide 1080 kcal, 99 grams protein, and 907 ml free water per day.  2. Fluids per MD.    RD following.

## 2018-12-29 NOTE — PROCEDURES
Date of service:  12/29/2018    Title:  Emergent endotracheal intubation    Indication:  Respiratory failure    Narrative:    The patient was sedated and paralyzed with 20 mg of IV etomidate and 70 mg of IV rocuronium.  A 7.5 Ni[place]an endotracheal tube was placed directly into the trachea using a #3 Glidescope without difficulty or apparent complication.  The CO2 detector made the appropriate color change, bilateral symmetrical breath sounds are present and fogging is present in the endotracheal tube.  All of this confirms that the endotracheal tube is indeed in the patient's trachea.  The patient tolerated the procedure quite nicely.  No complications are apparent.      Kendall Zuniga MD  Pulmonary and Critical Care Medicine

## 2018-12-29 NOTE — ASSESSMENT & PLAN NOTE
Continue flecainide  Heparin drip  Rate of heparin drip will be adjusted based on serial pTT measurements to ensure adequate anticoagulation and to avoid potential complications of bleeding

## 2018-12-29 NOTE — ASSESSMENT & PLAN NOTE
Hypoxia with bilateral infiltrates in patient with sepsis  Treat pneumonia, lung protective ventilation strategy

## 2018-12-29 NOTE — PROGRESS NOTES
2 RN skin check with Ignacio CANALES. Pt has boggy heels but no redness, slightly blanching redness on bilateral ears. Foam already applied to glassess. Pt refused sacral assessment at this time. No other signs of skin breakdown at this time

## 2018-12-29 NOTE — H&P
Hospital Medicine History & Physical Note    Date of Service  12/28/2018    Primary Care Physician  Lexis Orlando D.O.    Consultants  None    Code Status  DNR    Chief Complaint  Cough with shortness of breath times 2 days    History of Presenting Illness  84 y.o. female who presented 12/28/2018 with history of atrial fibrillation, Parkinson's disease and history of CVA presents with complaints of cough with shortness of breath times 2 days.  Patient has had 3 episodes of hospitalization in the last month.  She does report a history of pneumonia early in the month status post treatment.  She currently resides at skilled nursing facility.    So the history is provided by patient's  at bedside.  She had noted increasing cough and shortness of breath times 2 days and was evaluated by her primary care provider.  She was then advised to start taking doxycycline, however was unable to fill prescription prior to presentation to emergency room.  Patient does have increasing shortness of breath, chills and generalized fatigue.    On examination, patient reports slightly improving symptoms with oxygen supplementation.  She denies any body aches.    Review of Systems  Review of Systems   Constitutional: Negative for chills, diaphoresis, fever and malaise/fatigue.   HENT: Negative for congestion and hearing loss.    Respiratory: Positive for cough, sputum production and shortness of breath. Negative for wheezing.    Cardiovascular: Negative for chest pain, palpitations and leg swelling.   Gastrointestinal: Negative for abdominal pain, constipation, diarrhea, nausea and vomiting.   Genitourinary: Negative for dysuria and flank pain.   Musculoskeletal: Positive for myalgias. Negative for back pain and joint pain.   Neurological: Positive for weakness. Negative for dizziness, sensory change, speech change, focal weakness, loss of consciousness and headaches.   Psychiatric/Behavioral: Negative for depression and memory  loss. The patient is not nervous/anxious.        Past Medical History   has a past medical history of Afib (HCC); Arthritis; Breath shortness; Cancer (HCC) (2006); CATARACT (2005); Diabetes; Heart valve disease; High cholesterol; Hyperlipidemia; Hypertension; Indigestion; Other specified disorder of intestines; Pain; Parkinson's disease; Pneumonia (0ctober of 2015); Psychiatric problem; Renal disorder; Sleep apnea; Snoring; Stroke ([facility]) (1/2016); Unspecified disorder of thyroid; and Unspecified urinary incontinence.    Surgical History   has a past surgical history that includes hysterectomy radical (1972); mastectomy (Left, 2006); cataract phaco with iol (Right, 2005); cataract phaco with iol (6/9/2010); cardiac cath (2011); breast biopsy (5/21/2013); and irrigation & debridement ortho (Right, 11/10/2016).     Family History  family history includes Hypertension in her mother; Stroke in her mother.     Social History   reports that she has never smoked. She has never used smokeless tobacco. She reports that she does not drink alcohol or use drugs.  Currently resides at [facility]    Allergies  Allergies   Allergen Reactions   • Penicillins      Had as a child, unsure of reaction  About 70 years ago       Medications  Prior to Admission Medications   Prescriptions Last Dose Informant Patient Reported? Taking?   allopurinol (ZYLOPRIM) 100 MG Tab 12/28/2018 at 0800 MAR from Other Facility Yes No   Sig: Take 100 mg by mouth every morning.   amLODIPine (NORVASC) 5 MG Tab 12/28/2018 at 0800 MAR from Other Facility No No   Sig: Take 1 Tab by mouth every morning.   benzonatate (TESSALON) 100 MG Cap 12/25/2018 at PRN  Yes Yes   Sig: Take 100 mg by mouth 3 times a day as needed for Cough.   bisacodyl EC (DULCOLAX) 5 MG Tablet Delayed Response PRN at PRN MAR from Other Facility Yes No   Sig: Take 5 mg by mouth 1 time daily as needed for Constipation.   dabigatran (PRADAXA) 75 MG Cap capsule prn at Unknown  time MAR from Other Facility Yes No   Sig: Take 75 mg by mouth 2 Times a Day.   flecainide (TAMBOCOR) 100 MG Tab 12/28/2018 at 0800 MAR from Other Facility Yes No   Sig: Take 100 mg by mouth 2 times a day.   fluoxetine (PROZAC) 40 MG capsule 12/28/2018 at 0800 MAR from Other Facility Yes No   Sig: Take 40 mg by mouth every morning.   insulin detemir (LEVEMIR) 100 UNIT/ML Solution 12/27/2018 at PM Patient Yes No   Sig: Inject 35 Units as instructed every evening.   levothyroxine (SYNTHROID) 112 MCG Tab 12/28/2018 at 0700 MAR from Other Facility Yes No   Sig: Take 112 mcg by mouth Every morning on an empty stomach.   lisinopril (PRINIVIL) 10 MG Tab 12/28/2018 at 0800 MAR from Other Facility No No   Sig: Take 1 Tab by mouth every morning.   omeprazole (PRILOSEC) 20 MG delayed-release capsule 12/28/2018 at 0800 MAR from Other Facility Yes No   Sig: Take 20 mg by mouth every morning.   ropinirole (REQUIP) 4 MG tablet 12/28/2018 at 0800 MAR from Other Facility Yes No   Sig: Take 4 mg by mouth every morning.      Facility-Administered Medications: None       Physical Exam  Temp:  [37.8 °C (100 °F)-37.9 °C (100.2 °F)] 37.8 °C (100 °F)  Pulse:  [85-96] 96  Resp:  [20-22] 20  BP: (143-145)/(57-66) 143/66    Physical Exam   Constitutional: She is oriented to person, place, and time. She appears well-nourished. No distress.   HENT:   Head: Normocephalic and atraumatic.   Nose: Nose normal.   Mouth/Throat: No oropharyngeal exudate.   Eyes: Pupils are equal, round, and reactive to light. EOM are normal. Right eye exhibits no discharge. Left eye exhibits no discharge.   Neck: Neck supple. No JVD present. No thyromegaly present.   Cardiovascular: Normal rate and intact distal pulses.    No murmur heard.  Pulmonary/Chest: Effort normal. No respiratory distress. She has wheezes.   Abdominal: Soft. Bowel sounds are normal. She exhibits no distension. There is no tenderness.   Musculoskeletal: She exhibits no edema or tenderness.    Neurological: She is alert and oriented to person, place, and time. No cranial nerve deficit. She exhibits normal muscle tone. Coordination normal.   Skin: Skin is warm and dry. No rash noted. She is not diaphoretic. No erythema.   Psychiatric: She has a normal mood and affect. Her behavior is normal. Judgment and thought content normal.   Nursing note and vitals reviewed.      Laboratory:  Recent Labs      12/28/18   1543   WBC  13.4*   RBC  3.25*   HEMOGLOBIN  10.1*   HEMATOCRIT  29.8*   MCV  91.7   MCH  31.1   MCHC  33.9   RDW  46.3   PLATELETCT  188   MPV  10.1     Recent Labs      12/28/18   1543   SODIUM  136   POTASSIUM  4.1   CHLORIDE  107   CO2  19*   GLUCOSE  79   BUN  21   CREATININE  1.15   CALCIUM  8.4*     Recent Labs      12/28/18   1543   ALTSGPT  11   ASTSGOT  18   ALKPHOSPHAT  105*   TBILIRUBIN  0.6   GLUCOSE  79                 No results for input(s): TROPONINI in the last 72 hours.    Urinalysis:    No results found     Imaging:  DX-CHEST-PORTABLE (1 VIEW)   Final Result      1.  Moderate cardiomegaly.      2.  Increasing pneumonitis in the right lung.            Assessment/Plan:  I anticipate this patient will require at least two midnights for appropriate medical management, necessitating inpatient admission.    * HCAP (healthcare-associated pneumonia)   Assessment & Plan    Admit to tele unit/inpatient  Start vanco/zosyn  F/u blood/resp cx  RT protocol, O2 prn       Diabetes mellitus (HCC)- (present on admission)   Assessment & Plan    Decrease Lantus  ssi     Hypothyroid- (present on admission)   Assessment & Plan    .     HTN (hypertension)- (present on admission)   Assessment & Plan    Essential, cont home rx     Atrial fibrillation (HCC)- (present on admission)   Assessment & Plan    Rate controlled  On pradaza     Parkinson disease (HCC)- (present on admission)   Assessment & Plan    From [facility]  Pt/ot eval         VTE prophylaxis: Pradaxa

## 2018-12-29 NOTE — PROGRESS NOTES
Hospital Medicine Daily Progress Note    Date of Service  12/29/2018    Chief Complaint  84 y.o. female admitted 12/28/2018 with productive cough/sob.    Hospital Course    12/29:  This 85yo female who resides at [facility] with Parkinson's disease, prior breast ca s/p mastectomyCKD3 DM, previously on diuretics, nonsmoker, PAF with recent hospitalization for pneumonia x3 in last month.  She never finished her doxycycline course last admission.  She complains of SOB x 2 days. On admission she required 4 LPM NC, started on IVFS NS 83/hr and ended up on high flow O2 at 50 LPM NC, wet crackles on exam, tachypneic.  IVFs dc'd 83/hr.  Repeat stat CXR with ARDS and increase in fluid.  Due to CKD, GFR dropped 45 to 39 changed vancomycin IV to zyvox IV.  Continue maxipime IV for HCAP.    REviewed echo EF 55% moderate MR, ordered repeat echo, lasix 40mg IV, solumedrol 62.5 IV q 6, NSR  on monitor.  Due to continued SOB, tachypnea, not urinating yet, ordered jeter placement and ICU transfer.   Sputum culture ordered, SLP ordered. CRITICAL care time 65 minutes including code status, review of AD with patient and , stat interventions and transfer to ICU.*      Consultants/Specialty  CC    Code Status  12/29:  Discussed code status at bedside with patient and  and both have stated they want full code despite admission order stating DNR.   There is an AD on file, stating if no hope of survival, wish to dc     Disposition  Transfer to ICU.    Review of Systems  Review of Systems   Constitutional: Positive for malaise/fatigue. Negative for chills, diaphoresis and fever.   HENT: Negative for congestion and sore throat.    Eyes: Negative for pain and discharge.   Respiratory: Positive for cough, sputum production and shortness of breath. Negative for hemoptysis and wheezing.    Cardiovascular: Positive for leg swelling. Negative for chest pain, palpitations and claudication.   Gastrointestinal: Negative for  abdominal pain, constipation, diarrhea, melena, nausea and vomiting.   Genitourinary: Negative for dysuria, frequency and urgency.   Musculoskeletal: Negative for back pain, joint pain, myalgias and neck pain.   Skin: Negative for itching and rash.   Neurological: Negative for dizziness, sensory change, speech change, focal weakness, loss of consciousness, weakness and headaches.   Endo/Heme/Allergies: Does not bruise/bleed easily.   Psychiatric/Behavioral: Negative for depression, substance abuse and suicidal ideas.        Physical Exam  Temp:  [36.9 °C (98.4 °F)-37.9 °C (100.2 °F)] 36.9 °C (98.4 °F)  Pulse:  [73-96] 90  Resp:  [18-36] 34  BP: (119-145)/(55-66) 131/55    Physical Exam   Constitutional: She is oriented to person, place, and time. She appears well-developed and well-nourished. No distress.   HENT:   Head: Normocephalic and atraumatic.   Nose: Nose normal.   Mouth/Throat: Oropharynx is clear and moist. No oropharyngeal exudate.   Eyes: Pupils are equal, round, and reactive to light. Conjunctivae and EOM are normal. Right eye exhibits no discharge. Left eye exhibits no discharge. No scleral icterus.   Neck: Normal range of motion. Neck supple. No JVD present. No tracheal deviation present. No thyromegaly present.   Cardiovascular: Normal rate, regular rhythm, normal heart sounds and intact distal pulses.  Exam reveals no gallop and no friction rub.    No murmur heard.  Pulmonary/Chest: No stridor. She is in respiratory distress. She has no wheezes. She has rales. She exhibits no tenderness.   Tachypnea, wet crackles on exam throughout, acute distress noted.   Abdominal: Soft. Bowel sounds are normal. She exhibits no distension and no mass. There is no tenderness. There is no rebound and no guarding.   Musculoskeletal: Normal range of motion. She exhibits no edema or tenderness.   Lymphadenopathy:     She has no cervical adenopathy.   Neurological: She is alert and oriented to person, place, and time.  No cranial nerve deficit. She exhibits normal muscle tone. Coordination normal.   Skin: Skin is warm and dry. No rash noted. She is not diaphoretic. No erythema.   Psychiatric: She has a normal mood and affect. Her behavior is normal. Judgment and thought content normal.   Nursing note and vitals reviewed.      Fluids    Intake/Output Summary (Last 24 hours) at 12/29/18 1224  Last data filed at 12/28/18 1743   Gross per 24 hour   Intake              100 ml   Output                0 ml   Net              100 ml       Laboratory  Recent Labs      12/28/18   1543  12/29/18   0249   WBC  13.4*  16.5*   RBC  3.25*  3.12*   HEMOGLOBIN  10.1*  9.7*   HEMATOCRIT  29.8*  29.1*   MCV  91.7  93.3   MCH  31.1  31.1   MCHC  33.9  33.3*   RDW  46.3  46.7   PLATELETCT  188  171   MPV  10.1  9.9     Recent Labs      12/28/18   1543  12/29/18   0249   SODIUM  136  138   POTASSIUM  4.1  4.0   CHLORIDE  107  109   CO2  19*  20   GLUCOSE  79  117*   BUN  21  22   CREATININE  1.15  1.30   CALCIUM  8.4*  7.7*             Recent Labs      12/29/18   0249   TRIGLYCERIDE  69   HDL  18*   LDL  67       Imaging  DX-CHEST-PORTABLE (1 VIEW)   Final Result      1.  There are changes of vascular congestion/edema.   2.  Interval increase in parenchymal opacity suspicious for increasing changes of edema or pneumonia.      DX-CHEST-PORTABLE (1 VIEW)   Final Result      1.  Moderate cardiomegaly.      2.  Increasing pneumonitis in the right lung.      EC-ECHOCARDIOGRAM COMPLETE W/ CONT    (Results Pending)        Assessment/Plan  * HCAP (healthcare-associated pneumonia)   Assessment & Plan    12/29:  Repeat CXR with ARDS picture, admission with significant RUL hazy infiltrate seen on my review of images.  Changed  Vanco/cefepime to zyvox/cefepime since admitted x 3 in last month for pneumonia, HCAP.  F/u blood/ordered sputum culture  RT protocol, O2 prn  SLP eval,         ARDS (adult respiratory distress syndrome) ([facility])   Assessment & Plan    ARDS on  repeat CXR 12/29:  Zyvox, cefepime, lasix 40 IV stat, solumedrol 62 IV q 6 jeter.  High flow 50 LPM NC, transfer to ICU.     Diabetes mellitus (HCC)- (present on admission)   Assessment & Plan    Decrease Lantus  ssi     Hypothyroid- (present on admission)   Assessment & Plan    .     HTN (hypertension)- (present on admission)   Assessment & Plan    Essential, cont home rx     Atrial fibrillation (HCC)- (present on admission)   Assessment & Plan    Rate controlled  On pradaxa  12/29:  In ST on monitor since admission .     Parkinson disease (HCC)- (present on admission)   Assessment & Plan    From [facility]  Pt/ot eval  SLP eval          VTE prophylaxis: pradaxa

## 2018-12-29 NOTE — PROGRESS NOTES
Pharmacy Kinetics 84 y.o. female on vancomycin day # 2    2018    Currently on Vancomycin 1900 mg iv x1; 18@ 2046    Indication for Treatment: PNA    Pertinent history per medical record: Admitted on 2018 for CAP and SIRS.85 yo female, presented with complaint of cough with SOB x2 days and a recent h/o PNA earlier in Dec.  Pt resides at a SNF.  Physical exam positive for wheezing.  CXR positive for increasing pneumonitis in right lung.  PMH of AFib, Parkinson's, CVA     Other antibiotics: - Cefepime 2 g iv q24h    Allergies: Penicillins     List concerns for renal function: malnutrition/low albumin, 85 y/o F w/ SCr 1.3    Pertinent cultures to date:   18:PBCx2:NGTD    Imagin18:DX-Chest:Moderate cardiomegaly.Increasing pneumonitis in the right lung.    Recent Labs      18   1543  18   0249   WBC  13.4*  16.5*   NEUTSPOLYS  89.20*  93.30*     Recent Labs      18   1543  18   0249   BUN  21  22   CREATININE  1.15  1.30   ALBUMIN  2.9*   --      No results for input(s): VANCOTROUGH, VANCOPEAK, VANCORANDOM in the last 72 hours.  Intake/Output Summary (Last 24 hours) at 18 0848  Last data filed at 18 1743   Gross per 24 hour   Intake              100 ml   Output                0 ml   Net              100 ml      Blood pressure 136/59, pulse 77, temperature 36.9 °C (98.4 °F), temperature source Temporal, resp. rate 20, weight 75.4 kg (166 lb 3.6 oz), SpO2 94 %, not currently breastfeeding. Temp (24hrs), Av.6 °C (99.7 °F), Min:36.9 °C (98.4 °F), Max:37.9 °C (100.2 °F)      A/P   1. Vancomycin dose change: Pulse dosing  2. Next vancomycin level: 18@0900  3. Goal trough: 16-20 mcg/mL   4. Comments: Leukocytosis w/ downward trend, tmax 37.9. PBC NGTD, ordered MRSA swab and PCT. Patient @ risk to accumulate vancomycin 2/2 age and SCr. Level @ 0900.    Dejon Scott PharmD BCPS

## 2018-12-30 PROBLEM — R65.21 SEPTIC SHOCK (HCC): Status: ACTIVE | Noted: 2018-01-01

## 2018-12-30 PROBLEM — N17.9 ACUTE KIDNEY INJURY (HCC): Status: ACTIVE | Noted: 2018-01-01

## 2018-12-30 PROBLEM — I27.20 PULMONARY HYPERTENSION (HCC): Status: ACTIVE | Noted: 2018-01-01

## 2018-12-30 NOTE — ASSESSMENT & PLAN NOTE
This is severe sepsis with the following associated acute organ dysfunction(s): acute respiratory failure.   Pneumonia source  End organ dysfunction includes acute respiratory failure  Titrate pressors to maintain MAP of 65, she is on escalating doses of pressors

## 2018-12-30 NOTE — PROGRESS NOTES
12 hour chart check  MS: SR 60-80 Junctional 60-70  BP: 70-1teens  .20/.18/.40     2RN skin check

## 2018-12-30 NOTE — PROGRESS NOTES
Hospital Medicine Daily Progress Note    Date of Service  12/30/2018    Chief Complaint  84 y.o. female admitted 12/28/2018 with cough and shortness of breath    Hospital Course    Ms Schwab has a history of Parkinson's disease, breast cancer, diabetes, and recent hospitalizations for diagnosis of pneumonia.  Patient presented to the emergency room on 12/28/2018 with cough and shortness of breath, she was treated for community-acquired pneumonia and admitted to the hospital.  On 12/29/2018 her clinical status worsened and she was transferred to the ICU.  She was started on high flow oxygen, respiratory status continues to decline and she required intubation      Interval Problem Update  Rapid response and moved to ICU, intubated yesterday  Patient intubated and sedated in the ICU, thus unable to provide interval history  Sedated fentanyl drip @ 100mcg/hr, she will follow commands  IR SR, Systolic blood pressure ranges from 70's-110's, titrating levophed to maintain MAP 65  Urine output 600ml in last 24 hours, dropping off this morning    Consultants/Specialty  Critical Care, I discussed the patient's condition with Dr. Zuniga this morning on ICU Rounds    Code Status  Full Code    Disposition  ICU    Review of Systems  Review of Systems   Unable to perform ROS: Intubated        Physical Exam  Temp:  [36.9 °C (98.4 °F)-37.7 °C (99.9 °F)] 36.9 °C (98.4 °F)  Pulse:  [74-98] 74  Resp:  [20-50] 24  BP: (131)/(55) 131/55    Physical Exam   Constitutional: She appears well-developed and well-nourished.   HENT:   Head: Normocephalic and atraumatic.   Eyes: Conjunctivae are normal. Right eye exhibits no discharge. Left eye exhibits no discharge. No scleral icterus.   Cardiovascular: Normal rate, regular rhythm and intact distal pulses.    No murmur heard.  Pulmonary/Chest:   Equal chest rise and fall  Mechanical breath sounds bilaterally  No overt wheezing   Abdominal: Soft. Bowel sounds are normal. She exhibits no  distension. There is no tenderness.   Musculoskeletal: Normal range of motion. She exhibits edema.   Neurological:   Intubated and sedated  We will follow commands to  bilaterally   Skin: Skin is warm and dry. She is not diaphoretic.       Fluids    Intake/Output Summary (Last 24 hours) at 12/30/18 0801  Last data filed at 12/30/18 0600   Gross per 24 hour   Intake           2202.6 ml   Output              595 ml   Net           1607.6 ml       Laboratory  Recent Labs      12/28/18   1543  12/29/18   0249  12/30/18   0554   WBC  13.4*  16.5*  22.1*   RBC  3.25*  3.12*  3.15*   HEMOGLOBIN  10.1*  9.7*  9.5*   HEMATOCRIT  29.8*  29.1*  30.3*   MCV  91.7  93.3  96.2   MCH  31.1  31.1  30.2   MCHC  33.9  33.3*  31.4*   RDW  46.3  46.7  49.5   PLATELETCT  188  171  223   MPV  10.1  9.9  10.4     Recent Labs      12/28/18   1543  12/29/18   0249  12/30/18   0554   SODIUM  136  138  132*   POTASSIUM  4.1  4.0  4.3   CHLORIDE  107  109  103   CO2  19*  20  18*   GLUCOSE  79  117*  359*   BUN  21  22  48*   CREATININE  1.15  1.30  2.08*   CALCIUM  8.4*  7.7*  8.2*     Recent Labs      12/29/18   1600  12/30/18   0020  12/30/18   0554   APTT  45.9*  202.7*  155.5*   INR  1.73*   --    --      Recent Labs      12/29/18   0247   BNPBTYPENAT  705*     Recent Labs      12/29/18   0249   TRIGLYCERIDE  69   HDL  18*   LDL  67       Imaging  EC-ECHOCARDIOGRAM COMPLETE W/O CONT   Final Result      DX-CHEST-PORTABLE (1 VIEW)   Final Result         1. No significant interval change.      DX-ABDOMEN FOR TUBE PLACEMENT   Final Result      Feeding tube placement with the tip projecting over the distal stomach      DX-CHEST-PORTABLE (1 VIEW)   Final Result      1.  Endotracheal tube tip projects between the clavicular heads.      2.  Right internal jugular catheter tip projects over the superior vena cava. No pneumothorax is identified.      3.  Slight interval worsening in bilateral opacities.      DX-CHEST-PORTABLE (1 VIEW)   Final  Result      1.  There are changes of vascular congestion/edema.   2.  Interval increase in parenchymal opacity suspicious for increasing changes of edema or pneumonia.      DX-CHEST-PORTABLE (1 VIEW)   Final Result      1.  Moderate cardiomegaly.      2.  Increasing pneumonitis in the right lung.           Assessment/Plan  * Acute respiratory failure with hypoxia ([facility])- (present on admission)   Assessment & Plan    Due to pneumonia, ARDS, sepsis  Mechanical ventilation as per critical care     Septic shock ([facility])- (present on admission)   Assessment & Plan    This is severe sepsis with the following associated acute organ dysfunction(s): acute respiratory failure.   Pneumonia source  End organ dysfunction includes acute respiratory failure  Titrate pressors to maintain MAP of 65     ARDS (adult respiratory distress syndrome) ([facility])- (present on admission)   Assessment & Plan    Hypoxia with bilateral infiltrates in patient with sepsis  Treat pneumonia, lung protective ventilation strategy     HCAP (healthcare-associated pneumonia)- (present on admission)   Assessment & Plan    Cultures with no growth to date  Severe with sepsis and respiratory distress  She has risk factors for healthcare associated infection  Treat with broad-spectrum cefepime and linezolid       Diabetes mellitus ([facility])- (present on admission)   Assessment & Plan    NPH and sliding scale     HTN (hypertension)- (present on admission)   Assessment & Plan    History of hypertension, she is currently in shock     Atrial fibrillation ([facility])- (present on admission)   Assessment & Plan    Continue flecainide  Heparin drip  Rate of heparin drip will be adjusted based on serial pTT measurements to ensure adequate anticoagulation and to avoid potential complications of bleeding       Parkinson disease ([facility])- (present on admission)   Assessment & Plan    PT/OT when able     Hypothyroidism- (present on admission)   Assessment & Plan    .          VTE  prophylaxis: heparin drip

## 2018-12-30 NOTE — CARE PLAN
Problem: Skin Integrity  Goal: Risk for impaired skin integrity will decrease  Outcome: PROGRESSING AS EXPECTED   Q2hr turns implemented. Moist skin prevented and dietary need met. Waffle overlay in place    Problem: Pain Management  Goal: Pain level will decrease to patient's comfort goal  Outcome: PROGRESSING AS EXPECTED  Pt on fentanyl gtt

## 2018-12-30 NOTE — PROGRESS NOTES
Critical Care Progress Note    Date of admission  12/28/2018    Chief Complaint  84 y.o. female admitted 12/28/2018 with dyspnea.    Hospital Course    This lady was admitted to the hospital with pneumonia.  She developed worsening respiratory failure, septic shock.      Interval Problem Update  Reviewed last 24 hour events:      Follows  Fent 100  Junctional  NE 5 - 10  Fluid bolus  Start vaso  Minimal propofol  Afebrile  TF at goal (45)  Low UOP  PEEP 10  60%  CARLOS  Hep gtt  Start NPH 15 BID  Adjust flecanide for renal function - 50 BID      Review of Systems  Review of Systems   Unable to perform ROS: Acuity of condition        Vital Signs for last 24 hours   Temp:  [36.9 °C (98.4 °F)-37.7 °C (99.9 °F)] 36.9 °C (98.4 °F)  Pulse:  [74-98] 74  Resp:  [20-50] 24  BP: (131)/(55) 131/55    Hemodynamic parameters for last 24 hours       Respiratory  Thomas Vent Mode: APVCMV  Rate (breaths/min): 20  Vt Target (mL): 350  PEEP/CPAP: 10  FiO2: 70  P MEAN: 13  Control VTE (exp VT): 352    Physical Exam   Physical Exam   Constitutional:   On ventilator   HENT:   Head: Normocephalic and atraumatic.   Right Ear: External ear normal.   Left Ear: External ear normal.   Nose: Nose normal.   Mouth/Throat: Oropharynx is clear and moist.   Eyes: Pupils are equal, round, and reactive to light. Right eye exhibits no discharge. Left eye exhibits no discharge. No scleral icterus.   Neck: Normal range of motion. Neck supple. No tracheal deviation present.   Cardiovascular: Intact distal pulses.  Exam reveals no friction rub.    Sinus rhythm   Pulmonary/Chest: She has no wheezes. She has rales (Fine crackles bilaterally).   Abdominal: Soft. Bowel sounds are normal. She exhibits no distension. There is no tenderness. There is no rebound.   Tolerating enteral tube feedings   Musculoskeletal: She exhibits no tenderness or deformity.   No clubbing or cyanosis   Neurological:   Arouses easily.  Follows.  Nods.  Moves all 4 extremities.    Skin: Skin is warm and dry. No rash noted. She is not diaphoretic.       Medications  Current Facility-Administered Medications   Medication Dose Route Frequency Provider Last Rate Last Dose   • NOREPINEPHRINE BITARTRATE 1 MG/ML IV SOLN            • SODIUM CHLORIDE 0.9 % IV SOLN            • lidocaine (XYLOCAINE) 2 % injection 2 mL  2 mL Tracheal Tube PRN Jose De Jesus Ayon M.D.       • norepinephrine (LEVOPHED) 8 mg in  mL Infusion  0-30 mcg/min Intravenous Continuous Kendall Zuniga M.D.       • Linezolid (ZYVOX) premix 600 mg  600 mg Intravenous Q12HRS Genia Winchester M.D.   Stopped at 12/30/18 0101   • omeprazole 2 mg/mL in sodium bicarbonate (PRILOSEC) oral susp 40 mg  40 mg Feeding Tube DAILY Kendall Zuniga M.D.       • Respiratory Care per Protocol   Nebulization Continuous RT Kendall Zuniga M.D.       • senna-docusate (PERICOLACE or SENOKOT S) 8.6-50 MG per tablet 2 Tab  2 Tab Feeding Tube BID Kendall Zuniga M.D.   2 Tab at 12/29/18 1758    And   • polyethylene glycol/lytes (MIRALAX) PACKET 1 Packet  1 Packet Feeding Tube QDAY PRN Kendall Zuniga M.D.        And   • magnesium hydroxide (MILK OF MAGNESIA) suspension 30 mL  30 mL Feeding Tube QDAY PRN Kendall Zuniga M.D.        And   • bisacodyl (DULCOLAX) suppository 10 mg  10 mg Rectal QDAY PRN Kendall Zuniga M.D.       • MD Alert...ICU Electrolyte Replacement per Pharmacy   Other pharmacy to dose Kendall Zuniga M.D.       • Pharmacy Consult: Enteral tube feeding - review meds/change route/product selection   Other PRN Kendall Zuniga M.D.       • fentaNYL (SUBLIMAZE) injection 25 mcg  25 mcg Intravenous Q HOUR PRN Kendall Zuniga M.D.        Or   • fentaNYL (SUBLIMAZE) injection 50 mcg  50 mcg Intravenous Q HOUR PRN Kendall Zuniga M.D.   50 mcg at 12/30/18 0032    Or   • fentaNYL (SUBLIMAZE) injection 100 mcg  100 mcg Intravenous Q HOUR PRN Kendall Zuniga M.D.    100 mcg at 12/30/18 0421   • fentaNYL (SUBLIMAZE) 50 mcg/mL in 50mL   Intravenous Continuous Kendall Zuniga M.D. 2 mL/hr at 12/30/18 0418 100 mcg/hr at 12/30/18 0418   • ipratropium-albuterol (DUONEB) nebulizer solution  3 mL Nebulization Q2HRS PRN (RT) Kendall Zuniga M.D.       • ipratropium-albuterol (DUONEB) nebulizer solution  3 mL Nebulization Q4HRS (RT) Kendall Zuniga M.D.   3 mL at 12/30/18 0227   • insulin regular (HUMULIN R) injection 2-9 Units  2-9 Units Subcutaneous Q6HRS Kendall Zuniga M.D.   6 Units at 12/30/18 0026    And   • glucose 4 g chewable tablet 16 g  16 g Feeding Tube Q15 MIN PRN Kendall Zuniga M.D.        And   • dextrose 50% (D50W) injection 25 mL  25 mL Intravenous Q15 MIN PRN Kendall Zuniga M.D.       • propofol (DIPRIVAN) injection  0-80 mcg/kg/min Intravenous Continuous Kendall Zuniga M.D.   Stopped at 12/29/18 1546   • heparin injection 3,200 Units  3,200 Units Intravenous PRN Kendall Zuniga M.D.        And   • heparin infusion 25,000 units in 500 ml 0.45% nacl   Intravenous Continuous Kendall Zuniga M.D. 24 mL/hr at 12/30/18 0000 1,200 Units/hr at 12/30/18 0000   • acetaminophen (TYLENOL) tablet 650 mg  650 mg Feeding Tube Q6HRS PRN Kendall Zuniga M.D.       • allopurinol (ZYLOPRIM) tablet 100 mg  100 mg Per NG Tube QAM Kendall Zuniga M.D.       • amLODIPine (NORVASC) tablet 5 mg  5 mg Per NG Tube Q DAY Kendall Zuniga M.D.       • ROPINIRole (REQUIP) tablet 4 mg  4 mg Feeding Tube QAM Kendall Zuniga M.D.       • flecainide (TAMBOCOR) tablet 100 mg  100 mg Feeding Tube BID Kendall Zuniga M.D.   100 mg at 12/29/18 1758   • FLUoxetine (PROZAC) capsule 40 mg  40 mg Feeding Tube QAM Kendall Zuniga M.D.       • levothyroxine (SYNTHROID) tablet 112 mcg  112 mcg Feeding Tube AM ES Kendall Zuniga M.D.       • lisinopril (PRINIVIL) 10 MG tablet 10 mg  10 mg Feeding Tube  MASSIEL Zuniga M.D.       • cefepime (MAXIPIME) 2 g in  mL IVPB  2 g Intravenous Q24HRS Shannon Alcantar D.O.   Stopped at 12/29/18 1837       Fluids    Intake/Output Summary (Last 24 hours) at 12/30/18 0456  Last data filed at 12/30/18 0000   Gross per 24 hour   Intake           2005.6 ml   Output              530 ml   Net           1475.6 ml       Laboratory  Recent Labs      12/29/18   1526   ISTATAPH  7.162*   ISTATAPCO2  47.8*   ISTATAPO2  86   ISTATATCO2  19*   [id number]  93   ISTATARTHCO3  17.1   ISTATARTBE  -11*   ISTATTEMP  38.0 C   ISTATFIO2  100   ISTATSPEC  Arterial   ISTATAPHTC  7.149*   [id number]  92*     Recent Labs      12/29/18   0247   BNPBTYPENAT  705*     Recent Labs      12/28/18   1543  12/29/18   0249   SODIUM  136  138   POTASSIUM  4.1  4.0   CHLORIDE  107  109   CO2  19*  20   BUN  21  22   CREATININE  1.15  1.30   MAGNESIUM   --   1.4*   CALCIUM  8.4*  7.7*     Recent Labs      12/28/18   1543  12/29/18   0249   ALTSGPT  11   --    ASTSGOT  18   --    ALKPHOSPHAT  105*   --    TBILIRUBIN  0.6   --    GLUCOSE  79  117*     Recent Labs      12/28/18   1543  12/29/18   0249   WBC  13.4*  16.5*   NEUTSPOLYS  89.20*  93.30*   LYMPHOCYTES  4.70*  1.90*   MONOCYTES  4.60  3.90   EOSINOPHILS  1.00  0.20   BASOPHILS  0.10  0.20   ASTSGOT  18   --    ALTSGPT  11   --    ALKPHOSPHAT  105*   --    TBILIRUBIN  0.6   --      Recent Labs      12/28/18   1543  12/29/18   0249  12/29/18   1600  12/30/18   0020   RBC  3.25*  3.12*   --    --    HEMOGLOBIN  10.1*  9.7*   --    --    HEMATOCRIT  29.8*  29.1*   --    --    PLATELETCT  188  171   --    --    PROTHROMBTM   --    --   20.4*   --    APTT   --    --   45.9*  202.7*   INR   --    --   1.73*   --        Imaging  X-Ray:  I have personally reviewed the images and compared with prior images. and My impression is: Slightly improved bilateral opacities  Echo:   Reviewed    Assessment/Plan  * Acute respiratory failure with hypoxia (HCC)-  (present on admission)   Assessment & Plan    Emergently intubated on 12/29  All appropriate ventilator bundles have been initiated  Continue full vent support     Septic shock ([facility])- (present on admission)   Assessment & Plan    Associated acute respiratory failure  Pulmonary source  Titrating norepinephrine to maintain mean arterial pressure greater than 65  Lactic acid is normal  Continue empiric antibiotics     ARDS (adult respiratory distress syndrome) ([facility])- (present on admission)   Assessment & Plan    Ventilation with lung protective strategies     HCAP (healthcare-associated pneumonia)- (present on admission)   Assessment & Plan    Follow-up bronchoscopy culture results  Continue cefepime and Zyvox for now     Pulmonary hypertension ([facility])   Assessment & Plan    RVSP 50 mm Hg     Acute kidney injury ([facility])   Assessment & Plan    I will administer IV albumin  Bolus with IV fluids  Monitor renal function  Avoid nephrotoxins  Renal dose medications  Renal ultrasound on 11/29/2018 revealed no hydronephrosis, but evidence of chronic medical renal disease     Chronic diastolic (congestive) heart failure ([facility])- (present on admission)   Assessment & Plan    Grade 1 diastolic dysfunction     Diabetes mellitus ([facility])- (present on admission)   Assessment & Plan    Blood sugars have been high  Start NPH, 15 units twice daily  Continue sliding scale insulin     HTN (hypertension)- (present on admission)   Assessment & Plan    Hold antihypertensives as she is now in shock requiring vasopressor support     Atrial fibrillation ([facility])- (present on admission)   Assessment & Plan    Paroxysmal atrial fibrillation  Chronically anticoagulated with Pradaxa - continue to hold Pradaxa as we cannot administer it through her cortrak  Continue heparin drip on the weight-based protocol  Decrease flecainide, 50 mg twice daily     Parkinson disease ([facility])- (present on admission)   Assessment & Plan    Continue ropinirole, 4 mg daily      Hypothyroidism- (present on admission)   Assessment & Plan    Continue Synthroid, 112 mcg daily          VTE:  Heparin  Ulcer: H2 Antagonist  Lines: Central Line  Ongoing indication addressed and Alvarado Catheter  Ongoing indication addressed    I have performed a physical exam and reviewed and updated ROS and Plan today (12/30/2018). In review of yesterday's note (12/29/2018), there are no changes except as documented above.     I have assessed and reassessed her respiratory status with ventilator adjustments, airway mechanics, ventilator waveforms, blood pressure, hemodynamics, cardiovascular status with titration of norepinephrine and neurologic status.  I have assessed and reassessed her urine output and response to IV fluids.  She is at increased risk for worsening respiratory, cardiovascular and renal system dysfunction.    Discussed patient condition and risk of morbidity and/or mortality with Hospitalist, Family, RN, RT, Pharmacy, Charge nurse / hot rounds and QA team     The patient remains critically ill.  Critical care time = 120 minutes in directly providing and coordinating critical care and extensive data review.  No time overlap and excludes procedures.  This time is in addition to the time spent by Dr. Ayon earlier today.    Kendall Zuniga MD  Pulmonary and Critical Care Medicine

## 2018-12-30 NOTE — PROGRESS NOTES
Called about poor urine output.     Cr is better then baseline, lactate is good will continue to watch    Later called about hypotension  Bedside echo performed good bi-v function enlarged RV, LV mild thick, large LA IVC 2.4 cm down to 1.9 with spontaneous breath on mechanical vent    Will start norepinephrine for MAP > 65 for vasodilatory shock  Lactate 0.9      Patient remains in critical condition from evaluating patient started norepinephrine infusion . Critical care time provided was 45 minutes. This excludes all separate billable procedures.

## 2018-12-31 NOTE — PROGRESS NOTES
Monitor Summary   SR BBB 60-80's  0.16/0.20/0.44    12 hour chart check    2 RN skin assessment

## 2018-12-31 NOTE — CARE PLAN
Problem: Safety  Goal: Will remain free from falls  Outcome: PROGRESSING AS EXPECTED    Intervention: Assess risk factors for falls  Tricia Ricketts Fall assessment done.   Intervention: Implement fall precautions  Fall precautions in place.       Problem: Fluid Volume:  Goal: Will maintain balanced intake and output  Outcome: PROGRESSING AS EXPECTED    Intervention: Monitor, educate, and encourage compliance with therapeutic intake of liquids  Monitoring strict I and O's.

## 2018-12-31 NOTE — THERAPY
Occupational Therapy Contact Note    OT order received. Pt txf'd to OC on 12/29 after OT eval was ordered, and is now intubated with sedation. Will await reorders from MD for when patient is appropriate to participate in OT evaluation.    Rubi Ga, OTR/L

## 2018-12-31 NOTE — PROGRESS NOTES
Call to [facility] spoke with Fabiola, patient is a potential open candidate..  Referal number [phone]

## 2018-12-31 NOTE — CARE PLAN
Problem: Ventilation Defect:  Goal: Ability to achieve and maintain unassisted ventilation or tolerate decreased levels of ventilator support  Adult Ventilation Update    Total Vent Days: 2    Patient Lines/Drains/Airways Status    Active Airway     Name: Placement date: Placement time: Site: Days:    Airway ETT Oral 7.5 12/29/18   1340   Oral   1                   Plateau Pressure (Q Shift): 19 (12/30/18 0620)  Static Compliance (ml / cm H2O): 53.8 (12/30/18 1412)    Patient failed trials because of    Barriers to SBT    Length of Weaning Trial    Cough: Non Productive (12/30/18 1412)  Sputum Amount: Small (12/30/18 0227)  Sputum Color: White;Clear (12/30/18 0227)  Sputum Consistency: Thin (12/30/18 0227)    Mobility  Level of Mobility: Level IV (12/29/18 1015)  Activity Performed: Edge of bed (12/30/18 1400)  Time Activity Tolerated: 10 min (12/30/18 1400)  Distance Per Occurrence (ft.): 2 feet (12/29/18 1015)  # of Times Distance was Traveled: 2 (12/29/18 1015)  Assistance / Tolerance: Assistance of Two or More;Tires quickly (12/30/18 1400)  Pt Calls for Assistance: No (12/30/18 1400)  Staff Present for Mobilization: RN (12/30/18 1400)  Gait: Shuffle;Unsteady;Wide Based (12/29/18 1015)  Assistive Devices: Hand held assist (12/29/18 1015)  Reason Not Mobilized: Other (comment) (<24hrs intubation) (12/29/18 2000)  Mobilization Comments: <24hrs intubated (12/29/18 2000)    Events/Summary/Plan: Failure SBT

## 2018-12-31 NOTE — CARE PLAN
Problem: Nutritional:  Goal: Nutrition support tolerated and meeting greater than 85% of estimated needs  Outcome: MET Date Met: 12/31/18

## 2018-12-31 NOTE — DISCHARGE PLANNING
Patient is currently on service with [facility]. Please write home care orders upon discharge to home for continuum of care.[facility] at  [phone] if there are any questions.

## 2018-12-31 NOTE — PROGRESS NOTES
Critical Care Progress Note    Date of admission  12/28/2018    Chief Complaint  84 y.o. female admitted 12/28/2018 with dyspnea.    Hospital Course   This patient was admitted to the hospital with pneumonia.  She developed worsening respiratory failure, septic shock.      Interval Problem Update  Reviewed last 24 hour events:  Acute anuric renal failure with tripling of creatinine  Vasopressin added to norepi to treat septic shock  Follows some commands  SBP   CXR- 4 quad pulm infiltrates c/w ARDS  Vent day 3  Fi02 60%  Fent 50 mics/hour    Prior 24 hours  Follows  Fent 100  Junctional  NE 5 - 10  Fluid bolus  Start vaso  Minimal propofol  Afebrile  TF at goal (45)  Low UOP  PEEP 10  60%  CARLOS  Hep gtt  Start NPH 15 BID  Adjust flecanide for renal function - 50 BID      Review of Systems  Review of Systems   Unable to perform ROS: Acuity of condition        Vital Signs for last 24 hours   Temp:  [38.4 °C (101.1 °F)-38.6 °C (101.5 °F)] 38.6 °C (101.5 °F)  Pulse:  [58-84] 58  Resp:  [17-28] 22    Hemodynamic parameters for last 24 hours  CVP:  [15 MM HG-278 MM HG] 21 MM HG    Respiratory  Thomas Vent Mode: APVCMV  Rate (breaths/min): 24  Vt Target (mL): 350  PEEP/CPAP: 10  FiO2: 60  P Support: 8  P MEAN: 13  Control VTE (exp VT): 557    Physical Exam   Physical Exam   Constitutional:   On ventilator and sedated, elderly and fragile appearing   HENT:   Head: Normocephalic and atraumatic.   Right Ear: External ear normal.   Left Ear: External ear normal.   Nose: Nose normal.   Mouth/Throat: Oropharynx is clear and moist.   Eyes: Pupils are equal, round, and reactive to light. Right eye exhibits no discharge. Left eye exhibits no discharge. No scleral icterus.   Neck: Normal range of motion. Neck supple. No tracheal deviation present.   Cardiovascular: Intact distal pulses.  Exam reveals no friction rub.    Sinus rhythm   Pulmonary/Chest: She has no wheezes. She has rales (Fine crackles bilaterally).   Abdominal:  Soft. She exhibits no distension. There is no tenderness. There is no rebound.   Minimal bowel sounds   Musculoskeletal: She exhibits no tenderness or deformity.   No clubbing or cyanosis   Neurological:   Arouses easily.  Follows.  Nods.  Moves all 4 extremities to command although usually subdeued.   Skin: Skin is warm and dry. No rash noted. She is not diaphoretic.       Medications  Current Facility-Administered Medications   Medication Dose Route Frequency Provider Last Rate Last Dose   • docusate sodium 100mg/10mL (COLACE) solution 100 mg  100 mg Feeding Tube BID Ayad Payton M.D.   Stopped at 12/31/18 0945   • insulin glargine (LANTUS) injection 35 Units  35 Units Subcutaneous Q EVENING Ayad Payton M.D.       • lidocaine (XYLOCAINE) 2 % injection 2 mL  2 mL Tracheal Tube PRN Jose De Jesus Ayon M.D.       • norepinephrine (LEVOPHED) 8 mg in  mL Infusion  0-30 mcg/min Intravenous Continuous Kendall Zuniga M.D. 15 mL/hr at 12/30/18 1800 8 mcg/min at 12/30/18 1800   • vasopressin (VASOSTRICT) 20 Units in  mL Infusion  0.03 Units/min Intravenous Continuous Kendall Zuniga M.D. 9 mL/hr at 12/31/18 0830 0.03 Units/min at 12/31/18 0830   • flecainide (TAMBOCOR) tablet 50 mg  50 mg Feeding Tube BID Kendall Zuniga M.D.   50 mg at 12/31/18 0600   • omeprazole 2 mg/mL in sodium bicarbonate (PRILOSEC) oral susp 40 mg  40 mg Feeding Tube DAILY Kendall Zuniga M.D.   40 mg at 12/31/18 0509   • Respiratory Care per Protocol   Nebulization Continuous RT Kendall Zuniga M.D.       • senna-docusate (PERICOLACE or SENOKOT S) 8.6-50 MG per tablet 2 Tab  2 Tab Feeding Tube BID Kendall Zuniga M.D.   2 Tab at 12/31/18 0508    And   • polyethylene glycol/lytes (MIRALAX) PACKET 1 Packet  1 Packet Feeding Tube QDAY PRN Kendall Zuniga M.D.   1 Packet at 12/31/18 0840    And   • magnesium hydroxide (MILK OF MAGNESIA) suspension 30 mL  30 mL Feeding Tube QDAY PRN Kendall CASTLE  Paulino Amador M.D.        And   • bisacodyl (DULCOLAX) suppository 10 mg  10 mg Rectal QDAY PRN Kendall Zuniga M.D.       • Pharmacy Consult: Enteral tube feeding - review meds/change route/product selection   Other PRN Kendall Zuniga M.D.       • fentaNYL (SUBLIMAZE) injection 25 mcg  25 mcg Intravenous Q HOUR PRN Kendall Zuniga M.D.        Or   • fentaNYL (SUBLIMAZE) injection 50 mcg  50 mcg Intravenous Q HOUR PRN Kendall Zuniga M.D.   50 mcg at 12/30/18 0032    Or   • fentaNYL (SUBLIMAZE) injection 100 mcg  100 mcg Intravenous Q HOUR PRN Kendall Zuniga M.D.   100 mcg at 12/30/18 0421   • fentaNYL (SUBLIMAZE) 50 mcg/mL in 50mL   Intravenous Continuous Kendall Zuniga M.D. 1 mL/hr at 12/31/18 0704 50 mcg/hr at 12/31/18 0704   • ipratropium-albuterol (DUONEB) nebulizer solution  3 mL Nebulization Q2HRS PRN (RT) Kendall Zuniga M.D.       • ipratropium-albuterol (DUONEB) nebulizer solution  3 mL Nebulization Q4HRS (RT) Kendall Zuniga M.D.   3 mL at 12/31/18 1101   • insulin regular (HUMULIN R) injection 2-9 Units  2-9 Units Subcutaneous Q6HRS Kendall Zuniga M.D.   3 Units at 12/31/18 1214    And   • glucose 4 g chewable tablet 16 g  16 g Feeding Tube Q15 MIN PRN Kendall Zuniga M.D.        And   • dextrose 50% (D50W) injection 25 mL  25 mL Intravenous Q15 MIN PRN Kendall Zuniga M.D.       • propofol (DIPRIVAN) injection  0-80 mcg/kg/min Intravenous Continuous Kendall Zuniga M.D.   Stopped at 12/29/18 1546   • heparin injection 3,200 Units  3,200 Units Intravenous PRN Kendall Zuniga M.D.        And   • heparin infusion 25,000 units in 500 ml 0.45% nacl   Intravenous Continuous Kendall Zuniga M.D. 19 mL/hr at 12/31/18 0705 950 Units/hr at 12/31/18 0705   • acetaminophen (TYLENOL) tablet 650 mg  650 mg Feeding Tube Q6HRS PRN Kendall Zuniga M.D.   650 mg at 12/31/18 0839   • allopurinol (ZYLOPRIM) tablet  100 mg  100 mg Per NG Tube QAM Kendall Zuniga M.D.   100 mg at 12/31/18 0508   • amLODIPine (NORVASC) tablet 5 mg  5 mg Per NG Tube Q DAY Kendall Zuniga M.D.   Stopped at 12/30/18 0600   • ROPINIRole (REQUIP) tablet 4 mg  4 mg Feeding Tube QAANSELMO Zuniga M.D.   4 mg at 12/31/18 0510   • FLUoxetine (PROZAC) capsule 40 mg  40 mg Feeding Tube QAM Kendall Zuniga M.D.   40 mg at 12/31/18 0508   • levothyroxine (SYNTHROID) tablet 112 mcg  112 mcg Feeding Tube AM ES Kendall Zuniga M.D.   112 mcg at 12/31/18 0510   • cefepime (MAXIPIME) 2 g in  mL IVPB  2 g Intravenous Q24HRS Ashok Garsia M.D.   Stopped at 12/30/18 1705       Fluids    Intake/Output Summary (Last 24 hours) at 12/31/18 1408  Last data filed at 12/31/18 0600   Gross per 24 hour   Intake          3368.42 ml   Output               15 ml   Net          3353.42 ml       Laboratory  Recent Labs      12/29/18   1526  12/30/18   0406  12/31/18   0353   ISTATAPH  7.162*  7.269*  7.197*   ISTATAPCO2  47.8*  37.2*  35.3   ISTATAPO2  86  105*  68   ISTATATCO2  19*  18*  15*   SWTPFIY1CIH  93  97  89*   ISTATARTHCO3  17.1  17.0  13.7*   ISTATARTBE  -11*  -9*  -13*   ISTATTEMP  38.0 C  37.1 C  38.9 C   ISTATFIO2  100  70  60   ISTATSPEC  Arterial  Arterial  Arterial   ISTATAPHTC  7.149*  7.268*  7.172*   XHOIKYVA5MM  92*  106*  78     Recent Labs      12/29/18   0247   BNPBTYPENAT  705*     Recent Labs      12/29/18   0249  12/30/18   0554  12/31/18   0500   SODIUM  138  132*  131*   POTASSIUM  4.0  4.3  4.9   CHLORIDE  109  103  104   CO2  20  18*  16*   BUN  22  48*  76*   CREATININE  1.30  2.08*  3.22*   MAGNESIUM  1.4*  2.9*  2.6*   PHOSPHORUS   --   4.4  4.9*   CALCIUM  7.7*  8.2*  7.2*     Recent Labs      12/28/18   1543  12/29/18   0249  12/30/18   0554  12/31/18   0500   ALTSGPT  11   --    --   94*   ASTSGOT  18   --    --   275*   ALKPHOSPHAT  105*   --    --   73   TBILIRUBIN  0.6   --    --   0.5    PREALBUMIN   --    --   5.0*   --    GLUCOSE  79  117*  359*  278*     Recent Labs      12/28/18   1543  12/29/18   0249  12/30/18   0554  12/31/18   0500   WBC  13.4*  16.5*  22.1*  22.7*   NEUTSPOLYS  89.20*  93.30*  95.30*  92.30*   LYMPHOCYTES  4.70*  1.90*  1.40*  2.70*   MONOCYTES  4.60  3.90  2.50  4.10   EOSINOPHILS  1.00  0.20  0.00  0.00   BASOPHILS  0.10  0.20  0.10  0.20   ASTSGOT  18   --    --   275*   ALTSGPT  11   --    --   94*   ALKPHOSPHAT  105*   --    --   73   TBILIRUBIN  0.6   --    --   0.5     Recent Labs      12/29/18   0249  12/29/18   1600   12/30/18   0554  12/30/18   1338  12/30/18   1930  12/31/18   0500   RBC  3.12*   --    --   3.15*   --    --   2.74*   HEMOGLOBIN  9.7*   --    --   9.5*   --    --   8.3*   HEMATOCRIT  29.1*   --    --   30.3*   --    --   26.9*   PLATELETCT  171   --    --   223   --    --   222   PROTHROMBTM   --   20.4*   --    --    --    --    --    APTT   --   45.9*   < >  155.5*  85.2*  89.4*   --    INR   --   1.73*   --    --    --    --    --     < > = values in this interval not displayed.       Imaging  X-Ray:  I have personally reviewed the images and compared with prior images. and My impression is: Slightly improved bilateral opacities  Echo:   Reviewed    Assessment/Plan  * Acute respiratory failure with hypoxia (HCC)- (present on admission)   Assessment & Plan    Emergently intubated on 12/29  All appropriate ventilator bundles have been initiated  Continue full vent support    Not candidate now for wean/extubation (too sick)     Septic shock (HCC)- (present on admission)   Assessment & Plan    Associated acute respiratory failure  Pulmonary source    Now on two pressors and dramatically worsening renal function  Shock secondary to pneumonia, no defintive organism yet defined  Continue broad spectrum antibiotics       ARDS (adult respiratory distress syndrome) (HCC)- (present on admission)   Assessment & Plan    Ventilation with lung protective  strategies    Grave prognosis with persistent shock, ARDS     HCAP (healthcare-associated pneumonia)- (present on admission)   Assessment & Plan    Follow-up bronchoscopy culture results  Continue cefepime and Zyvox for now. Stop Zyvox uf cultures remaind negativeand nasal swbb negaive     Pulmonary hypertension (HCC)   Assessment & Plan    RVSP 50 mm Hg    Right heart failure further complicates and worsens overall prognosis     Acute kidney injury (HCC)   Assessment & Plan    I will administer IV albumin  Bolus with IV fluids  Monitor renal function  Avoid nephrotoxins  Renal dose medications  Renal ultrasound on 11/29/2018 revealed no hydronephrosis, but evidence of chronic medical renal disease    Today has acute anuric renal failure/ I called for renal consult. However per family ( and daughter) pt was clear in the past that she would not ever want dialysis. In fact,  and daughter ask that we now change goals of care to comfort measures only. They appreciate that patient may die within hours to several days once this change in goals is undertaken. They are confident that this would be patient's perference     Chronic diastolic (congestive) heart failure (HCC)- (present on admission)   Assessment & Plan    Grade 1 diastolic dysfunction     Diabetes mellitus (HCC)- (present on admission)   Assessment & Plan    Blood sugars have been high  Start NPH, 15 units twice daily  Continue sliding scale insulin  Increase long-acting to better control BS     HTN (hypertension)- (present on admission)   Assessment & Plan    Hold antihypertensives as she is now in shock requiring vasopressor support     Hypomagnesemia   Assessment & Plan    Replete     Atrial fibrillation (HCC)- (present on admission)   Assessment & Plan    Paroxysmal atrial fibrillation  Chronically anticoagulated with Pradaxa - continue to hold Pradaxa as we cannot administer it through her cortrak  Continue heparin drip on the weight-based  protocol  Decrease flecainide, 50 mg twice daily     Parkinson disease (HCC)- (present on admission)   Assessment & Plan    Continue ropinirole, 4 mg daily     Hypothyroidism- (present on admission)   Assessment & Plan    Continue Synthroid, 112 mcg daily          VTE:  Heparin  Ulcer: H2 Antagonist  Lines: Central Line  Ongoing indication addressed and Alvarado Catheter  Ongoing indication addressed    I have performed a physical exam and reviewed and updated ROS and Plan today (12/31/2018). In review of yesterday's note (12/30/2018), there are no changes except as documented above.     I have assessed and reassessed her respiratory status with ventilator adjustments, airway mechanics, ventilator waveforms, blood pressure, hemodynamics, cardiovascular status with titration of norepinephrine and neurologic status.  I have assessed and reassessed her urine output and response to IV fluids.  She is at increased risk for worsening respiratory, cardiovascular and renal system dysfunction.    Discussed patient condition and risk of morbidity and/or mortality with Hospitalist, Family, RN, RT, Pharmacy, Charge nurse / hot rounds and QA team     The patient remains critically ill.  Critical care time = 90 minutes in directly providing and coordinating critical care and extensive data review.  No time overlap and excludes procedures.

## 2018-12-31 NOTE — THERAPY
PT order received. Pt txf'd to OC on 12/29 after PT eval was ordered, and is now intubated with sedation. Please re-order when patient is appropriate to participate in PT evaluation

## 2018-12-31 NOTE — ASSESSMENT & PLAN NOTE
I will administer IV albumin  Bolus with IV fluids  Monitor renal function  Avoid nephrotoxins  Renal dose medications  Renal ultrasound on 11/29/2018 revealed no hydronephrosis, but evidence of chronic medical renal disease    Today has acute anuric renal failure/ I called for renal consult. However per family ( and daughter) pt was clear in the past that she would not ever want dialysis. In fact,  and daughter ask that we now change goals of care to comfort measures only. They appreciate that patient may die within hours to several days once this change in goals is undertaken. They are confident that this would be patient's perference

## 2019-01-01 PROCEDURE — 665999 HH PPS REVENUE DEBIT

## 2019-01-01 PROCEDURE — 665998 HH PPS REVENUE CREDIT

## 2019-01-01 NOTE — ASSESSMENT & PLAN NOTE
Due to sepsis  Worsened renal failure despite hemodynamic support  Will discuss with family, dialysis is probably not going to improve her situation

## 2019-01-01 NOTE — PROGRESS NOTES
Pt upon first assessment sedated lightly with fentanyl 50mcg/hr.  Pt will open her eyes, follow some commands, nodded that she could see me.  Pt VSS on Vasopressin and Levophed.  Pt's  at bedside,  BS very hypoactive.     1000 Pt given full bath, shampoo and during this tube feedings held.  I clamped off the tube for tube feedings and when we went to turn the pt, she had approx 250cc of thick mucousy brown fluid come out of core trak.  Dr. Payton notified.  Tube feedings held.    Dr Garsia into see pt, family decided to go comfort care, waiting on grandson to get here.  Pastor Scruggs went into see the family.

## 2019-01-01 NOTE — DISCHARGE SUMMARY
Death Summary    Cause of Death  Acute respiratory failure due to pneumonia due to unknown organism    Comorbid Conditions at the Time of Death  Principal Problem:    Acute respiratory failure with hypoxia (HCC) POA: Yes  Active Problems:    HCAP (healthcare-associated pneumonia) POA: Yes    ARDS (adult respiratory distress syndrome) (HCC) POA: Yes    Septic shock (HCC) POA: Yes    HTN (hypertension) POA: Yes    Diabetes mellitus (HCC) POA: Yes    Chronic diastolic (congestive) heart failure (HCC) POA: Yes    Atelectasis POA: Yes    Acute renal failure (ARF) (HCC) POA: Unknown    Pulmonary hypertension (HCC) POA: Unknown    Hypothyroidism POA: Yes    Parkinson disease (HCC) POA: Yes    Atrial fibrillation (HCC) POA: Yes    Hypomagnesemia POA: Unknown  Resolved Problems:    * No resolved hospital problems. *      History of Presenting Illness and Hospital Course    This is a 84 y.o. female admitted 12/28/2018 with cough and shortness of breath    Patient with a history of Parkinson's disease, breast cancer, diabetes, and recent hospitalizations for diagnosis of pneumonia.  Patient presented to the emergency room on 12/28/2018 with cough and shortness of breath, she was treated for community-acquired pneumonia and admitted to the hospital.  On 12/29/2018 her clinical status worsened and she was transferred to the ICU.  She was started on high flow oxygen, respiratory status continues to decline and she required intubation.  She became anuric overnight on 12/31/18 .  We met with the patients family and discussed her deteriorating status.  They indicated that the patient would not want to continue aggressive care and would never have wanted to be on dialysis.  She was transitioned to comfort care and compassionately extubated.    Death Date: 12/31/18   Death Time: 1751         Pronounced By (RN1): Blanca Conteh RN  Pronounced By (RN2): Tammie Christensen RN

## 2019-01-01 NOTE — PROGRESS NOTES
1739 1mg of Ativan given  1739 5mg of Morphine given  1740 Pt extubated  1751 Pt pronounced per April ZEESHAN Conteh, RN and Tammie Christensen, RN     1805 Call to [facility] [phone], spoke with Santos.  Once all call made, call them back to arrange for pt to be picked up.    1835 Call to [facility] at [phone] on hold for 13 minutes will call them again.      1856 spoke with [facility] coroners office, pt is not a coroners case, able to go to [facility] in body bag.    1901 call to donor network spoke with Mariusz, he will notify tissue service for donation

## 2019-01-01 NOTE — RESPIRATORY CARE
Extubation    Cuff leak noted NA  Stridor present NA        #SVN Performed: Yes (12/29/18 1110)  FiO2%: 60 % (12/31/18 1600)  O2 (LPM): 60 (12/29/18 1245)  O2 Daily Delivery Respiratory : Highflow Nasal Cannula (12/29/18 1110)  Patient toleration NA  RCP Complete? Yes  Events/Summary/Plan: Extubate to comfort care. (12/31/18 3635)

## 2019-01-02 LAB
BACTERIA BLD CULT: NORMAL
BACTERIA BLD CULT: NORMAL
SIGNIFICANT IND 70042: NORMAL
SIGNIFICANT IND 70042: NORMAL
SITE SITE: NORMAL
SITE SITE: NORMAL
SOURCE SOURCE: NORMAL
SOURCE SOURCE: NORMAL

## 2019-01-02 PROCEDURE — 665999 HH PPS REVENUE DEBIT

## 2019-01-02 PROCEDURE — 665998 HH PPS REVENUE CREDIT

## 2019-01-02 ASSESSMENT — ENCOUNTER SYMPTOMS: MENTAL STATUS CHANGE: 0

## 2019-01-03 PROCEDURE — 665999 HH PPS REVENUE DEBIT

## 2019-01-03 PROCEDURE — 665998 HH PPS REVENUE CREDIT

## 2019-01-04 PROCEDURE — 665999 HH PPS REVENUE DEBIT

## 2019-01-04 PROCEDURE — 665998 HH PPS REVENUE CREDIT

## 2019-01-05 PROCEDURE — 665998 HH PPS REVENUE CREDIT

## 2019-01-05 PROCEDURE — 665999 HH PPS REVENUE DEBIT

## 2019-01-06 PROCEDURE — 665998 HH PPS REVENUE CREDIT

## 2019-01-06 PROCEDURE — 665999 HH PPS REVENUE DEBIT

## 2019-01-07 PROCEDURE — 665998 HH PPS REVENUE CREDIT

## 2019-01-07 PROCEDURE — 665999 HH PPS REVENUE DEBIT

## 2019-01-07 NOTE — DOCUMENTATION QUERY
DOCUMENTATION QUERY    PROVIDERS: Please select “Cosign w/ note”to reply to query.    To better represent the severity of illness of your patient, please review the following information and exercise your independent professional judgment in responding to this query.     Sepsis is documented in the Progress Notes. Based upon the clinical findings, risk factors, and treatment, please specify if this condition was present on admission or developed after admission    • Severe sepsis with septic shock present on admit  • Severe sepsis with septic shock developed after admit  • Sepsis present on admit, with septic shock developing after admit  • Other explanation of clinical findings (please document)        The medical record reflects the following:   Clinical Findings     Treatment    Risk Factors    Location within medical record  Progress Notes     Thank you,   Maegan Elmore

## 2019-01-08 PROCEDURE — 665998 HH PPS REVENUE CREDIT

## 2019-01-08 PROCEDURE — 665999 HH PPS REVENUE DEBIT

## 2019-01-08 NOTE — ADDENDUM NOTE
Encounter addended by: Maegan Elmore on: 1/8/2019 11:51 AM<BR>    Actions taken: Delete clinical note

## 2019-01-09 PROCEDURE — 665999 HH PPS REVENUE DEBIT

## 2019-01-09 PROCEDURE — 665998 HH PPS REVENUE CREDIT

## 2019-01-10 PROCEDURE — 665998 HH PPS REVENUE CREDIT

## 2019-01-10 PROCEDURE — 665999 HH PPS REVENUE DEBIT

## 2019-01-11 PROCEDURE — 665998 HH PPS REVENUE CREDIT

## 2019-01-11 PROCEDURE — 665999 HH PPS REVENUE DEBIT

## 2019-01-12 PROCEDURE — 665999 HH PPS REVENUE DEBIT

## 2019-01-12 PROCEDURE — 665998 HH PPS REVENUE CREDIT

## 2019-01-13 PROCEDURE — 665999 HH PPS REVENUE DEBIT

## 2019-01-13 PROCEDURE — 665998 HH PPS REVENUE CREDIT

## 2019-01-14 PROCEDURE — 665998 HH PPS REVENUE CREDIT

## 2019-01-14 PROCEDURE — 665999 HH PPS REVENUE DEBIT

## 2019-01-15 PROCEDURE — 665999 HH PPS REVENUE DEBIT

## 2019-01-15 PROCEDURE — 665998 HH PPS REVENUE CREDIT

## 2019-01-15 NOTE — ADDENDUM NOTE
Encounter addended by: Maegan Elmore on: 1/15/2019  2:39 PM<BR>    Actions taken: Pend clinical note

## 2019-01-15 NOTE — DOCUMENTATION QUERY
"DOCUMENTATION QUERY    PROVIDERS: Please select “Cosign w/ note”to reply to query.    To better represent the severity of illness of your patient, please review the following information and exercise your independent professional judgment in responding to this query.     Patient was admitted for pneumonia on 12/28, sepsis is not documented until 12/29 on consult by Dr. Zuniga. Progress note on 12/30 by Dr. Payton indicates \" On 12/29/18 her clinical status worsened and she was transferred to the ICU, \" and septic shock is noted in the assessment. Discharge summary note septic shock as present on admit. Based upon the clinical findings, risk factors, and treatment, please specify if this condition was present on admission or developed after admission      • Severe sepsis with septic shock present on admit  • Severe sepsis with septic shock developed after admission  • Sepsis present on admit, with septic shock developing after admit  • Other explanation of clinical findings (please document)      The medical record reflects the following:   Clinical Findings Became hypotensive 12/30 started on norepinephrine due to shock   Treatment vasopressors   Risk Factors Pneumonia, acute renal and respiratory failure   Location within medical record  Progress Notes     Thank you,   Maegan Elmore        "

## 2019-01-16 PROCEDURE — 665998 HH PPS REVENUE CREDIT

## 2019-01-16 PROCEDURE — 665999 HH PPS REVENUE DEBIT

## 2019-01-17 PROCEDURE — 665998 HH PPS REVENUE CREDIT

## 2019-01-17 PROCEDURE — 665999 HH PPS REVENUE DEBIT

## 2019-01-18 PROCEDURE — 665998 HH PPS REVENUE CREDIT

## 2019-01-18 PROCEDURE — 665999 HH PPS REVENUE DEBIT

## 2019-01-18 NOTE — ADDENDUM NOTE
Encounter addended by: Maegan Elmore on: 1/18/2019  6:12 AM<BR>    Actions taken: Sign clinical note

## 2019-01-19 PROCEDURE — 665998 HH PPS REVENUE CREDIT

## 2019-01-19 PROCEDURE — 665999 HH PPS REVENUE DEBIT

## 2019-01-20 PROCEDURE — 665999 HH PPS REVENUE DEBIT

## 2019-01-20 PROCEDURE — 665998 HH PPS REVENUE CREDIT

## 2019-01-21 PROCEDURE — 665999 HH PPS REVENUE DEBIT

## 2019-01-21 PROCEDURE — 665998 HH PPS REVENUE CREDIT

## 2019-01-22 PROCEDURE — 665998 HH PPS REVENUE CREDIT

## 2019-01-22 PROCEDURE — 665999 HH PPS REVENUE DEBIT

## 2019-01-23 LAB
FUNGUS SPEC CULT: ABNORMAL
FUNGUS SPEC CULT: ABNORMAL
SIGNIFICANT IND 70042: ABNORMAL
SITE SITE: ABNORMAL
SOURCE SOURCE: ABNORMAL

## 2019-01-23 PROCEDURE — 665999 HH PPS REVENUE DEBIT

## 2019-01-23 PROCEDURE — 665998 HH PPS REVENUE CREDIT

## 2019-01-23 NOTE — ADDENDUM NOTE
Encounter addended by: Shannon Alcantar D.O. on: 1/22/2019  5:11 PM<BR>    Actions taken: Cosign clinical note with attestation

## 2019-01-24 PROCEDURE — 665999 HH PPS REVENUE DEBIT

## 2019-01-24 PROCEDURE — 665998 HH PPS REVENUE CREDIT

## 2019-01-25 PROCEDURE — 665999 HH PPS REVENUE DEBIT

## 2019-01-25 PROCEDURE — 665998 HH PPS REVENUE CREDIT

## 2019-01-26 PROCEDURE — 665998 HH PPS REVENUE CREDIT

## 2019-01-26 PROCEDURE — 665999 HH PPS REVENUE DEBIT

## 2019-01-27 PROCEDURE — 665998 HH PPS REVENUE CREDIT

## 2019-01-27 PROCEDURE — 665999 HH PPS REVENUE DEBIT

## 2019-01-28 PROCEDURE — 665999 HH PPS REVENUE DEBIT

## 2019-01-28 PROCEDURE — 665998 HH PPS REVENUE CREDIT

## 2019-01-29 PROCEDURE — 665999 HH PPS REVENUE DEBIT

## 2019-01-29 PROCEDURE — 665998 HH PPS REVENUE CREDIT

## 2019-01-30 PROCEDURE — 665999 HH PPS REVENUE DEBIT

## 2019-01-30 PROCEDURE — 665998 HH PPS REVENUE CREDIT

## 2019-01-31 LAB
EKG IMPRESSION: NORMAL
EKG IMPRESSION: NORMAL

## 2019-01-31 PROCEDURE — 665998 HH PPS REVENUE CREDIT

## 2019-01-31 PROCEDURE — 665999 HH PPS REVENUE DEBIT

## 2019-02-01 PROCEDURE — 665998 HH PPS REVENUE CREDIT

## 2019-02-01 PROCEDURE — 665999 HH PPS REVENUE DEBIT

## 2019-02-02 PROCEDURE — 665998 HH PPS REVENUE CREDIT

## 2019-02-02 PROCEDURE — 665999 HH PPS REVENUE DEBIT

## 2019-02-03 PROCEDURE — 665998 HH PPS REVENUE CREDIT

## 2019-02-03 PROCEDURE — 665999 HH PPS REVENUE DEBIT

## 2019-02-23 LAB
MYCOBACTERIUM SPEC CULT: NORMAL
RHODAMINE-AURAMINE STN SPEC: NORMAL
SIGNIFICANT IND 70042: NORMAL
SITE SITE: NORMAL
SOURCE SOURCE: NORMAL

## 2019-10-08 NOTE — ASSESSMENT & PLAN NOTE
Due to pneumonia, ARDS, sepsis  Mechanical ventilation as per critical care  Worsening respiratory status despite max support   Health Maintenance Due   Topic Date Due   • Cervical Cancer Screening  04/27/2019   • Influenza Vaccine (1) 09/01/2019       Patient is due for topics as listed above but is not proceeding with Immunization(s) Influenza at this time. Education provided for Immunization(s) Influenza

## 2022-11-29 NOTE — PROGRESS NOTES
Blood sugar =139, no insullin required   Most likely the patient picked up the prescription from the pharmacy

## 2023-07-31 NOTE — PROGRESS NOTES
[facility] Medicine Daily Progress Note    Date of Service  12/31/2018    Chief Complaint  84 y.o. female admitted 12/28/2018 with cough and shortness of breath    Hospital Course    Ms Schwab has a history of Parkinson's disease, breast cancer, diabetes, and recent hospitalizations for diagnosis of pneumonia.  Patient presented to the emergency room on 12/28/2018 with cough and shortness of breath, she was treated for community-acquired pneumonia and admitted to the hospital.  On 12/29/2018 her clinical status worsened and she was transferred to the ICU.  She was started on high flow oxygen, respiratory status continues to decline and she required intubation.  She became anuric overnight on 12/31/18.      Interval Problem Update  Patient intubated and sedated in the ICU, thus unable to provide interval history  Sedated with propofol and fentanyl drip, she is not following commands  She is on vasopressin and levophed, levophed being titrated up to maintain MAP of 65  Urine output 5ml    Consultants/Specialty  Critical Care, I discussed the patient's condition with Dr. Garsia this morning on ICU Rounds    Code Status  Full Code    Disposition  ICU  Poor prognosis    Review of Systems  Review of Systems   Unable to perform ROS: Intubated        Physical Exam  Temp:  [37.9 °C (100.2 °F)-38.6 °C (101.5 °F)] 38.6 °C (101.5 °F)  Pulse:  [58-86] 58  Resp:  [17-32] 22    Physical Exam   Constitutional: She appears well-developed and well-nourished.   HENT:   Head: Normocephalic and atraumatic.   Eyes: Conjunctivae are normal. Right eye exhibits no discharge. Left eye exhibits no discharge. No scleral icterus.   Cardiovascular: Normal rate, regular rhythm and intact distal pulses.    No murmur heard.  Pulmonary/Chest:   Equal chest rise and fall  Mechanical breath sounds bilaterally  No overt wheezing   Abdominal: Soft. Bowel sounds are normal. She exhibits no distension. There is no tenderness.   Musculoskeletal: Normal range  of motion. She exhibits edema.   Neurological:   Intubated and sedated  We will follow commands to  bilaterally   Skin: Skin is warm and dry. She is not diaphoretic.       Fluids    Intake/Output Summary (Last 24 hours) at 12/31/18 0905  Last data filed at 12/31/18 0600   Gross per 24 hour   Intake          5075.93 ml   Output               52 ml   Net          5023.93 ml       Laboratory  Recent Labs      12/29/18   0249  12/30/18   0554  12/31/18   0500   WBC  16.5*  22.1*  22.7*   RBC  3.12*  3.15*  2.74*   HEMOGLOBIN  9.7*  9.5*  8.3*   HEMATOCRIT  29.1*  30.3*  26.9*   MCV  93.3  96.2  98.2*   MCH  31.1  30.2  30.3   MCHC  33.3*  31.4*  30.9*   RDW  46.7  49.5  52.6*   PLATELETCT  171  223  222   MPV  9.9  10.4  11.2     Recent Labs      12/29/18   0249  12/30/18   0554  12/31/18   0500   SODIUM  138  132*  131*   POTASSIUM  4.0  4.3  4.9   CHLORIDE  109  103  104   CO2  20  18*  16*   GLUCOSE  117*  359*  278*   BUN  22  48*  76*   CREATININE  1.30  2.08*  3.22*   CALCIUM  7.7*  8.2*  7.2*     Recent Labs      12/29/18   1600   12/30/18   0554  12/30/18   1338  12/30/18   1930   APTT  45.9*   < >  155.5*  85.2*  89.4*   INR  1.73*   --    --    --    --     < > = values in this interval not displayed.     Recent Labs      12/29/18   0247   BNPBTYPENAT  705*     Recent Labs      12/29/18   0249  12/31/18   0500   TRIGLYCERIDE  69  129   HDL  18*   --    LDL  67   --        Imaging  DX-CHEST-PORTABLE (1 VIEW)   Final Result         1.  Appearance of the current portable radiograph is stable compared to the prior exam.      2.  No new or acute abnormalities have developed since the prior radiograph.      EC-ECHOCARDIOGRAM COMPLETE W/O CONT   Final Result      DX-CHEST-PORTABLE (1 VIEW)   Final Result         1. No significant interval change.      DX-ABDOMEN FOR TUBE PLACEMENT   Final Result      Feeding tube placement with the tip projecting over the distal stomach      DX-CHEST-PORTABLE (1 VIEW)   Final  Result      1.  Endotracheal tube tip projects between the clavicular heads.      2.  Right internal jugular catheter tip projects over the superior vena cava. No pneumothorax is identified.      3.  Slight interval worsening in bilateral opacities.      DX-CHEST-PORTABLE (1 VIEW)   Final Result      1.  There are changes of vascular congestion/edema.   2.  Interval increase in parenchymal opacity suspicious for increasing changes of edema or pneumonia.      DX-CHEST-PORTABLE (1 VIEW)   Final Result      1.  Moderate cardiomegaly.      2.  Increasing pneumonitis in the right lung.           Assessment/Plan  * Acute respiratory failure with hypoxia ([facility])- (present on admission)   Assessment & Plan    Due to pneumonia, ARDS, sepsis  Mechanical ventilation as per critical care  Worsening respiratory status despite max support     Septic shock ([facility])- (present on admission)   Assessment & Plan    This is severe sepsis with the following associated acute organ dysfunction(s): acute respiratory failure.   Pneumonia source  End organ dysfunction includes acute respiratory failure  Titrate pressors to maintain MAP of 65, she is on escalating doses of pressors     ARDS (adult respiratory distress syndrome) ([facility])- (present on admission)   Assessment & Plan    Hypoxia with bilateral infiltrates in patient with sepsis  Treat pneumonia, lung protective ventilation strategy     HCAP (healthcare-associated pneumonia)- (present on admission)   Assessment & Plan    Cultures with no growth to date  Severe with sepsis and respiratory distress  She has risk factors for healthcare associated infection  Treat with broad-spectrum cefepime and linezolid       Acute renal failure (ARF) ([facility])   Assessment & Plan    Due to sepsis  Worsened renal failure despite hemodynamic support  Will discuss with family, dialysis is probably not going to improve her situation     Diabetes mellitus ([facility])- (present on admission)   Assessment & Plan    NPH and  sliding scale     HTN (hypertension)- (present on admission)   Assessment & Plan    History of hypertension, she is currently in shock     Atrial fibrillation (HCC)- (present on admission)   Assessment & Plan    Continue flecainide  Heparin drip  Rate of heparin drip will be adjusted based on serial pTT measurements to ensure adequate anticoagulation and to avoid potential complications of bleeding       Parkinson disease (HCC)- (present on admission)   Assessment & Plan    PT/OT when able     Hypothyroidism- (present on admission)   Assessment & Plan    .          VTE prophylaxis: heparin drip       [Negative] : Heme/Lymph